# Patient Record
Sex: FEMALE | Race: WHITE | NOT HISPANIC OR LATINO | Employment: OTHER | URBAN - METROPOLITAN AREA
[De-identification: names, ages, dates, MRNs, and addresses within clinical notes are randomized per-mention and may not be internally consistent; named-entity substitution may affect disease eponyms.]

---

## 2018-02-14 ENCOUNTER — CONVERSION ENCOUNTER (OUTPATIENT)
Dept: MAMMOGRAPHY | Facility: HOSPITAL | Age: 73
End: 2018-02-14

## 2018-07-03 ENCOUNTER — OFFICE VISIT CONVERTED (OUTPATIENT)
Dept: SURGERY | Facility: CLINIC | Age: 73
End: 2018-07-03
Attending: PHYSICIAN ASSISTANT

## 2018-07-03 ENCOUNTER — CONVERSION ENCOUNTER (OUTPATIENT)
Dept: SURGERY | Facility: CLINIC | Age: 73
End: 2018-07-03

## 2019-03-29 ENCOUNTER — HOSPITAL ENCOUNTER (OUTPATIENT)
Dept: LAB | Facility: HOSPITAL | Age: 74
Discharge: HOME | End: 2019-03-29
Attending: INTERNAL MEDICINE

## 2019-03-29 LAB
25(OH)D3 SERPL-MCNC: 46.4 NG/ML (ref 30–100)
CALCIUM SERPL-MCNC: 9.5 MG/DL (ref 8.7–10.4)

## 2019-10-20 ENCOUNTER — HOSPITAL ENCOUNTER (OUTPATIENT)
Dept: URGENT CARE | Facility: CLINIC | Age: 74
Discharge: HOME | End: 2019-10-20
Attending: NURSE PRACTITIONER

## 2019-10-23 LAB — BACTERIA UR CULT: NORMAL

## 2019-12-04 ENCOUNTER — HOSPITAL ENCOUNTER (OUTPATIENT)
Dept: LAB | Facility: HOSPITAL | Age: 74
Discharge: HOME | End: 2019-12-04
Attending: INTERNAL MEDICINE

## 2019-12-04 LAB
25(OH)D3 SERPL-MCNC: 48.5 NG/ML (ref 30–100)
CALCIUM SERPL-MCNC: 9.7 MG/DL (ref 8.7–10.4)

## 2020-02-05 ENCOUNTER — HOSPITAL ENCOUNTER (OUTPATIENT)
Dept: MAMMOGRAPHY | Facility: HOSPITAL | Age: 75
Discharge: HOME | End: 2020-02-05
Attending: NURSE PRACTITIONER

## 2020-05-05 ENCOUNTER — HOSPITAL ENCOUNTER (OUTPATIENT)
Dept: LAB | Facility: HOSPITAL | Age: 75
Discharge: HOME | End: 2020-05-05
Attending: INTERNAL MEDICINE

## 2020-05-05 LAB
25(OH)D3 SERPL-MCNC: 52.8 NG/ML (ref 30–100)
CALCIUM SERPL-MCNC: 9.1 MG/DL (ref 8.7–10.4)

## 2020-07-23 ENCOUNTER — HOSPITAL ENCOUNTER (OUTPATIENT)
Dept: URGENT CARE | Facility: CLINIC | Age: 75
Discharge: HOME | End: 2020-07-23
Attending: FAMILY MEDICINE

## 2020-07-24 LAB — SARS-COV-2 RNA SPEC QL NAA+PROBE: NOT DETECTED

## 2020-09-30 ENCOUNTER — HOSPITAL ENCOUNTER (OUTPATIENT)
Dept: LAB | Facility: HOSPITAL | Age: 75
Discharge: HOME | End: 2020-09-30
Attending: INTERNAL MEDICINE

## 2020-10-01 LAB
25(OH)D3 SERPL-MCNC: 50.4 NG/ML (ref 30–100)
CALCIUM SERPL-MCNC: 9.3 MG/DL (ref 8.7–10.4)
CREAT UR-MCNC: 1.12 MG/DL (ref 0.5–0.9)

## 2020-12-15 ENCOUNTER — HOSPITAL ENCOUNTER (OUTPATIENT)
Dept: SURGERY | Facility: CLINIC | Age: 75
Discharge: HOME | End: 2020-12-15
Attending: NURSE PRACTITIONER

## 2020-12-15 ENCOUNTER — OFFICE VISIT CONVERTED (OUTPATIENT)
Dept: UROLOGY | Facility: CLINIC | Age: 75
End: 2020-12-15
Attending: NURSE PRACTITIONER

## 2020-12-17 LAB — BACTERIA UR CULT: NORMAL

## 2021-01-19 ENCOUNTER — HOSPITAL ENCOUNTER (OUTPATIENT)
Dept: GASTROENTEROLOGY | Facility: HOSPITAL | Age: 76
Setting detail: HOSPITAL OUTPATIENT SURGERY
Discharge: HOME | End: 2021-01-19
Attending: INTERNAL MEDICINE

## 2021-04-12 ENCOUNTER — HOSPITAL ENCOUNTER (OUTPATIENT)
Dept: VACCINE CLINIC | Facility: HOSPITAL | Age: 76
Discharge: HOME | End: 2021-04-12
Attending: INTERNAL MEDICINE

## 2021-05-03 ENCOUNTER — HOSPITAL ENCOUNTER (OUTPATIENT)
Dept: VACCINE CLINIC | Facility: HOSPITAL | Age: 76
Discharge: HOME | End: 2021-05-03
Attending: INTERNAL MEDICINE

## 2021-05-10 NOTE — H&P
History and Physical      Patient Name: Devi Bobo   Patient ID: [id number]   Sex: Female   YOB: 1945    Primary Care Provider: Raad Del Toro MD   Referring Provider: Raad Del Toro MD    Visit Date: December 15, 2020    Provider: DEMETRA Beckett   Location: [facility]   Location Address: [address] [place] [address]  [id number]   Location Phone: [phone]          Chief Complaint  · Unable to hold urine  · Urinary urgency      History Of Present Illness  The patient is a 75 year old /White female, who is a consultation from Raad Del Toro MD, for the evaluation of urge incontinence which the patient noted seemed associated with no known event.   Symptoms:  She has had intermittent episodes episodes of incontinence with urgency. She describes leakage of moderate amounts of urine. The symptoms are getting worse. The patient uses 0 pads a day.   The following aggravating factors are noted caffeine. The patient drinks 5 caffeinated drinks a day. She also reports frequency and abdominal pain. The patient voids every 1 hour. She denies dysuria, gross hematuria, needing to strain to void, and bedwetting.   History:  The patient has not been previously evaluated for this condition. Her past medical history is non-contributory. She denies a history of recurrent urinary tract infections, kidney stones, cerebrovascular accidents, and diabetes.   Meds:  Current no  medications        States urgency and states that if she hold her urine it is painful.    She has nausea at times.    She  was previously seen by Shana Claros PA-C and treated with oxybutynin for this incontinence although the patient never returned for her f/u visit.     She reports that the oxybutynin was very effective but she thought her issue was resolved and did not start taking the medication again.    She does have a history of chronic pelvic pain as well that was noted in her last visit from  .       Past Medical History  Arthritis; Arthritis; Bladder Disorder; Bronchitis; Cataract; Chronic pelvic pain in female; Cystocele; Frequency of micturition; High blood pressure; HTN (hypertension); Nocturia; Osteoporosis; Pain, Generalized; Pain, Leg; Recent Cold; Urge Incontinence; Urgency of urination; Urinary incontinence         Past Surgical History  Cataract exctraction with lens implant; Colonoscopy; Cystoscopy; Tubal ligation         Medication List  alendronate 70 mg oral tablet; Calcium 600 + D(3) 600 mg calcium- 200 unit oral capsule; Centrum Silver 0.4-300-250 mg-mcg-mcg oral tablet; lisinopril 10 mg oral tablet; Neurontin 600 mg oral tablet; Norco 5-325 mg oral tablet         Allergy List  SULFA (SULFONAMIDES)         Family Medical History  Multiple Sclerosis; Heart Disease; Renal Calculus; Family history of colon cancer; -Father's Family History Unknown; -Mother's Family History Unknown         Reproductive History   0 Para 0 0 0 0       Social History  Alcohol (Never); Caffeine (Current - status unknown); Disabled; Second hand smoke exposure (Never); Tobacco (Former);          Review of Systems  · Constitutional  o Denies  o : fever, headache, chills  · Eyes  o Denies  o : eye pain, double vision, blurred vision  · HENT  o Denies  o : sinus problems, sore throat, ear infection  · Cardiovascular  o Denies  o : chest pain, high blood pressure, varicosities  · Respiratory  o Denies  o : shortness of breath, wheezing, frequent cough  · Gastrointestinal  o Denies  o : nausea, vomiting, heartburn, indigestion, abdominal pain  · Genitourinary  o Denies  o : urgency, frequency, urinary retention, painful urination  · Integument  o Denies  o : rash, itching, boils  · Neurologic  o Denies  o : tingling or numbness, tremors, dizzy spells  · Musculoskeletal  o Denies  o : joint pain, neck pain, back pain  · Endocrine  o Denies  o : cold intolerance, heat intolerance, tired, excessive thirst,  "sluggish  · Psychiatric  o Admits  o : feels satisfied with life  o Denies  o : severe depression, concerns with hurting themselves  · Heme-Lymph  o Denies  o : swollen glands, blood clotting problems  · Allergic-Immunologic  o Denies  o : sinus allergy symptoms, hay fever      Vitals  Date Time BP Position Site L\R Cuff Size HR RR TEMP (F) WT  HT  BMI kg/m2 BSA m2 O2 Sat FR L/min FiO2        12/15/2020 10:53 AM       12  121lbs 8oz 5'  3\" 21.52 1.57             Physical Examination  · Constitutional  o Appearance  o : Well nourished, well developed patient in no acute distress. Ambulating without difficulty.  · Head and Face  o Head  o :   § Inspection  § : atraumatic, normocephalic  o Face  o :   § Inspection  § : no facial lesions  · Eyes  o Sclerae  o : sclerae white  · Ears, Nose, Mouth and Throat  o Ears  o :   § External Ears  § : appearance within normal limits, no lesions present  o Nose  o :   § External Nose  § : appearance normal  · Neck  o Inspection/Palpation  o : normal appearance, trachea midline  · Respiratory  o Respiratory Effort  o : Breathing is unlabored without accessory muscle use  o Inspection of Chest  o : normal appearance, no retractions  · Skin and Subcutaneous Tissue  o General Inspection  o : No rashes, lesions or areas of discoloration present. Skin turgor is normal.  · Neurologic  o Mental Status Examination  o :   § Orientation  § : grossly oriented to person, place and time  § Speech/Language  § : communication ability within normal limits  o Gait and Station  o : normal gait, able to stand without difficulty  · Psychiatric  o Judgement and Insight  o : judgment and insight intact, judgement for everyday activities and social situations within normal limits, insight intact  o Mood and Affect  o : mood normal, affect appropriate          Results  · In-Office Procedures  o Lab procedure  § Automated dipstick urinalysis with microscopy ([id number])   § Color Ur: Yellow   § Clarity Ur: " Clear   § Glucose Ur Ql Strip: Negative   § Bilirub Ur Ql Strip: Negative   § Ketones Ur Ql Strip: Negative   § Sp Gr Ur Qn: 1.010   § Hgb Ur Ql Strip: Negative   § pH Ur-LsCnc: 6.5   § Prot Ur Ql Strip: Negative   § Urobilinogen Ur Strip-mCnc: 0.2   § Nitrite Ur Ql Strip: Negative   § WBC Est Ur Ql Strip: Trace   § RBC UrnS Qn HPF: 0   § WBC UrnS Qn HPF: 0-2   § Bacteria UrnS Qn HPF: 0   § Crystals UrnS Qn HPF: 0   § Epithelial Cells (non renal): 0 /HPF  § Epithelial Cells (renal): 0       Assessment  · Urge Incontinence     788.31/N39.41      Plan  · Orders  o Urine Culture (Clean Catch) [facility] ([id number]) - 788.31/N39.41 - 12/15/2020  · Medications  o oxybutynin chloride 5 mg oral tablet extended release 24hr   SIG: take 1 tablet (5 mg) by oral route once daily for 30 days   DISP: (30) Tablet with 11 refills  Prescribed on 12/15/2020     o Medications have been Reconciled  o Transition of Care or Provider Policy  · Instructions  o DISCUSSION:  o I have discussed with the patient the diagnosis of incontinence with potential treatment options in detail. Ample time was given for questions. We will proceed with plans as outlined below.  o The patient has mixed incontinence with urge urinary incontinence as the primary component. Options of observation, Kegel exercises, medications, bulking agents and a sling were discussed. All questions were answered.  o PLAN: will restart oxybutynin 5mg qd   o Timed voiding q 3-4 hours  o Instructions Given for Kegel Exercises  o Double voiding  o Avoid bladder irritants  o Cut down nighttime fluids  o Electronically Identified Patient Education Materials Provided Electronically            Electronically Signed by: DEMETRA Beckett -Author on December 15, 2020 11:30:35 AM

## 2021-05-14 VITALS — WEIGHT: 121.5 LBS | HEIGHT: 63 IN | RESPIRATION RATE: 12 BRPM | BODY MASS INDEX: 21.53 KG/M2

## 2021-05-16 VITALS — WEIGHT: 116.12 LBS | BODY MASS INDEX: 20.57 KG/M2 | RESPIRATION RATE: 12 BRPM | HEIGHT: 63 IN

## 2021-05-31 ENCOUNTER — HOSPITAL ENCOUNTER (OUTPATIENT)
Dept: URGENT CARE | Facility: CLINIC | Age: 76
Discharge: HOME | End: 2021-05-31
Attending: FAMILY MEDICINE

## 2021-06-03 LAB — BACTERIA UR CULT: NORMAL

## 2021-09-30 ENCOUNTER — LAB (OUTPATIENT)
Dept: LAB | Facility: HOSPITAL | Age: 76
End: 2021-09-30

## 2021-09-30 ENCOUNTER — TRANSCRIBE ORDERS (OUTPATIENT)
Dept: LAB | Facility: HOSPITAL | Age: 76
End: 2021-09-30

## 2021-09-30 DIAGNOSIS — M81.0 SENILE OSTEOPOROSIS: Primary | ICD-10-CM

## 2021-09-30 DIAGNOSIS — Z79.899 ENCOUNTER FOR LONG-TERM (CURRENT) USE OF OTHER MEDICATIONS: ICD-10-CM

## 2021-09-30 DIAGNOSIS — M81.0 SENILE OSTEOPOROSIS: ICD-10-CM

## 2021-09-30 LAB
CALCIUM SPEC-SCNC: 9.4 MG/DL (ref 8.6–10.5)
CREAT SERPL-MCNC: 1.15 MG/DL (ref 0.57–1)
GFR SERPL CREATININE-BSD FRML MDRD: 46 ML/MIN/1.73

## 2021-09-30 PROCEDURE — 82310 ASSAY OF CALCIUM: CPT

## 2021-09-30 PROCEDURE — 36415 COLL VENOUS BLD VENIPUNCTURE: CPT

## 2021-09-30 PROCEDURE — 82306 VITAMIN D 25 HYDROXY: CPT

## 2021-09-30 PROCEDURE — 82565 ASSAY OF CREATININE: CPT

## 2021-10-01 LAB — 25(OH)D3 SERPL-MCNC: 51.9 NG/ML

## 2021-10-05 ENCOUNTER — OFFICE VISIT (OUTPATIENT)
Dept: OBSTETRICS AND GYNECOLOGY | Facility: CLINIC | Age: 76
End: 2021-10-05

## 2021-10-05 VITALS
WEIGHT: 116.4 LBS | HEIGHT: 64 IN | HEART RATE: 88 BPM | SYSTOLIC BLOOD PRESSURE: 127 MMHG | DIASTOLIC BLOOD PRESSURE: 75 MMHG | BODY MASS INDEX: 19.87 KG/M2

## 2021-10-05 DIAGNOSIS — R87.613 HSIL (HIGH GRADE SQUAMOUS INTRAEPITHELIAL LESION) ON PAP SMEAR OF CERVIX: ICD-10-CM

## 2021-10-05 DIAGNOSIS — Z12.31 ENCOUNTER FOR SCREENING MAMMOGRAM FOR MALIGNANT NEOPLASM OF BREAST: ICD-10-CM

## 2021-10-05 DIAGNOSIS — N95.2 VAGINAL ATROPHY: ICD-10-CM

## 2021-10-05 DIAGNOSIS — Z01.419 ENCOUNTER FOR ANNUAL ROUTINE GYNECOLOGICAL EXAMINATION: Primary | ICD-10-CM

## 2021-10-05 PROBLEM — N81.10 FEMALE BLADDER PROLAPSE: Status: ACTIVE | Noted: 2021-10-05

## 2021-10-05 PROBLEM — R39.15 URINARY URGENCY: Status: ACTIVE | Noted: 2017-12-01

## 2021-10-05 PROBLEM — M47.816 LUMBAR SPONDYLOSIS: Status: ACTIVE | Noted: 2019-02-15

## 2021-10-05 PROBLEM — G57.03 PIRIFORMIS SYNDROME OF BOTH SIDES: Status: ACTIVE | Noted: 2021-10-05

## 2021-10-05 PROBLEM — Z79.4 ENCOUNTER FOR LONG-TERM (CURRENT) USE OF INSULIN: Status: ACTIVE | Noted: 2018-08-21

## 2021-10-05 PROBLEM — I10 HIGH BLOOD PRESSURE: Status: ACTIVE | Noted: 2021-10-05

## 2021-10-05 PROBLEM — I10 HTN (HYPERTENSION): Status: ACTIVE | Noted: 2021-10-05

## 2021-10-05 PROBLEM — M81.0 OSTEOPOROSIS: Status: ACTIVE | Noted: 2020-10-07

## 2021-10-05 PROBLEM — R35.0 FREQUENCY OF MICTURITION: Status: ACTIVE | Noted: 2017-12-01

## 2021-10-05 PROBLEM — H26.9 CATARACT: Status: ACTIVE | Noted: 2021-10-05

## 2021-10-05 PROBLEM — R32 URINARY INCONTINENCE: Status: ACTIVE | Noted: 2021-10-05

## 2021-10-05 PROBLEM — R35.1 NOCTURIA: Status: ACTIVE | Noted: 2017-12-01

## 2021-10-05 PROBLEM — M51.36 DEGENERATION OF LUMBAR INTERVERTEBRAL DISC: Status: ACTIVE | Noted: 2021-10-05

## 2021-10-05 PROBLEM — N32.9 BLADDER DISORDER: Status: ACTIVE | Noted: 2021-10-05

## 2021-10-05 PROBLEM — R52 PAIN, GENERALIZED: Status: ACTIVE | Noted: 2021-10-05

## 2021-10-05 PROBLEM — R87.611 ATYPICAL SQUAMOUS CELLS CANNOT EXCLUDE HIGH GRADE SQUAMOUS INTRAEPITHELIAL LESION ON CYTOLOGIC SMEAR OF CERVIX (ASC-H): Status: ACTIVE | Noted: 2021-10-05

## 2021-10-05 PROBLEM — IMO0002 HERNIATION OF INTERVERTEBRAL DISC: Status: ACTIVE | Noted: 2017-01-12

## 2021-10-05 PROBLEM — J40 BRONCHITIS: Status: ACTIVE | Noted: 2021-10-05

## 2021-10-05 PROBLEM — M51.369 DEGENERATION OF LUMBAR INTERVERTEBRAL DISC: Status: ACTIVE | Noted: 2021-10-05

## 2021-10-05 PROBLEM — N39.41 URGE INCONTINENCE: Status: ACTIVE | Noted: 2017-12-01

## 2021-10-05 PROCEDURE — 88175 CYTOPATH C/V AUTO FLUID REDO: CPT | Performed by: NURSE PRACTITIONER

## 2021-10-05 PROCEDURE — G0101 CA SCREEN;PELVIC/BREAST EXAM: HCPCS | Performed by: NURSE PRACTITIONER

## 2021-10-05 PROCEDURE — 87624 HPV HI-RISK TYP POOLED RSLT: CPT | Performed by: NURSE PRACTITIONER

## 2021-10-05 RX ORDER — LISINOPRIL 10 MG/1
10 TABLET ORAL DAILY
COMMUNITY
Start: 2021-09-20 | End: 2023-04-03 | Stop reason: HOSPADM

## 2021-10-05 RX ORDER — GABAPENTIN 600 MG/1
600 TABLET ORAL 3 TIMES DAILY
COMMUNITY
Start: 2021-09-04

## 2021-10-05 RX ORDER — HYDROCODONE BITARTRATE AND ACETAMINOPHEN 5; 325 MG/1; MG/1
1 TABLET ORAL EVERY 8 HOURS PRN
COMMUNITY
Start: 2021-09-28 | End: 2022-01-20

## 2021-10-05 RX ORDER — ESTRADIOL 0.1 MG/G
0.1 CREAM VAGINAL 3 TIMES WEEKLY
COMMUNITY
End: 2021-10-05 | Stop reason: SDUPTHER

## 2021-10-05 RX ORDER — ALENDRONATE SODIUM 70 MG/1
70 TABLET ORAL
COMMUNITY
Start: 2021-09-10 | End: 2023-04-03 | Stop reason: HOSPADM

## 2021-10-05 RX ORDER — ESTRADIOL 0.1 MG/G
CREAM VAGINAL
Qty: 42 G | Refills: 6 | Status: SHIPPED | OUTPATIENT
Start: 2021-10-05 | End: 2023-01-06 | Stop reason: SDUPTHER

## 2021-10-12 LAB
CONV .: ABNORMAL
CYTOLOGIST CVX/VAG CYTO: ABNORMAL
CYTOLOGY CVX/VAG DOC CYTO: ABNORMAL
CYTOLOGY CVX/VAG DOC THIN PREP: ABNORMAL
DX ICD CODE: ABNORMAL
DX ICD CODE: ABNORMAL
HIV 1 & 2 AB SER-IMP: ABNORMAL
HPV I/H RISK 4 DNA CVX QL PROBE+SIG AMP: POSITIVE
OTHER STN SPEC: ABNORMAL
PATHOLOGIST CVX/VAG CYTO: ABNORMAL
RECOM F/U CVX/VAG CYTO: ABNORMAL
STAT OF ADQ CVX/VAG CYTO-IMP: ABNORMAL

## 2021-10-20 ENCOUNTER — OFFICE VISIT (OUTPATIENT)
Dept: OBSTETRICS AND GYNECOLOGY | Facility: CLINIC | Age: 76
End: 2021-10-20

## 2021-10-20 VITALS
HEART RATE: 82 BPM | WEIGHT: 115.8 LBS | DIASTOLIC BLOOD PRESSURE: 77 MMHG | SYSTOLIC BLOOD PRESSURE: 124 MMHG | BODY MASS INDEX: 19.77 KG/M2 | HEIGHT: 64 IN

## 2021-10-20 DIAGNOSIS — R87.611 ATYPICAL SQUAMOUS CELLS CANNOT EXCLUDE HIGH GRADE SQUAMOUS INTRAEPITHELIAL LESION ON CYTOLOGIC SMEAR OF CERVIX (ASC-H): Primary | ICD-10-CM

## 2021-10-20 DIAGNOSIS — R87.810 CERVICAL HIGH RISK HPV (HUMAN PAPILLOMAVIRUS) TEST POSITIVE: ICD-10-CM

## 2021-10-20 PROBLEM — M19.90 OSTEOARTHRITIS: Status: ACTIVE | Noted: 2021-10-20

## 2021-10-20 PROBLEM — G57.00 SCIATIC NERVE LESION: Status: ACTIVE | Noted: 2021-10-20

## 2021-10-20 PROBLEM — M47.817 LUMBOSACRAL SPONDYLOSIS WITHOUT MYELOPATHY: Status: ACTIVE | Noted: 2021-10-20

## 2021-10-20 PROBLEM — M47.812 CERVICAL SPONDYLOSIS WITHOUT MYELOPATHY: Status: ACTIVE | Noted: 2021-10-20

## 2021-10-20 PROBLEM — R42 CHRONIC VERTIGO: Status: ACTIVE | Noted: 2021-10-20

## 2021-10-20 PROBLEM — R26.9 ABNORMAL GAIT: Status: ACTIVE | Noted: 2021-10-20

## 2021-10-20 PROBLEM — G93.32 CHRONIC FATIGUE SYNDROME: Status: ACTIVE | Noted: 2021-10-20

## 2021-10-20 PROCEDURE — 88305 TISSUE EXAM BY PATHOLOGIST: CPT | Performed by: NURSE PRACTITIONER

## 2021-10-20 PROCEDURE — 57456 ENDOCERV CURETTAGE W/SCOPE: CPT | Performed by: NURSE PRACTITIONER

## 2021-10-20 NOTE — PROGRESS NOTES
Colposcopy Procedure Note  Procedures    Indications: Devi Bobo is a 76 y.o. female, , whose No LMP recorded. Patient is postmenopausal..  She presents for follow up for evaluation of an abnormal PAP smear that showed ASCUS, cannot exclude ASC-H, +HPV.  Pap on 19 was HGSIL with negative ECC on colposcopy.  She understands the need for the procedure and is aware of the complications, including post-colposcopic vaginal bleeding, vaginal leukorrhea or cervicitis.  She is aware she may experience discomfort.  After being presented with the risk, benefits, and alternatives the patient wished to proceed.    Previous hx of abnormal pap  - yes, HGSIL    Prior cervical treatment: prior colposcopy.    Procedure Details   The risks and benefits of the procedure and informed consent obtained.  She was positioned in the dorsal lithotomy position and a speculum was inserted into the vagina and excellent visualization of cervix achieved, cervix swabbed x 3 with acetic acid solution and with Lugol's solution.  The transformation zone was not completely visualized.  Cervical Biopsy Performed?  no  An endocervical curettage was performed.  This colposcopy was satisfactory.     Findings:  The procedure was notable for:  Physical Exam  Vitals and nursing note reviewed. Exam conducted with a chaperone present.   Constitutional:       Appearance: Normal appearance. She is well-developed and well-groomed.   HENT:      Head: Normocephalic.   Eyes:      Pupils: Pupils are equal, round, and reactive to light.   Genitourinary:     General: Normal vulva.      Exam position: Lithotomy position.         Skin:     General: Skin is warm and dry.   Neurological:      General: No focal deficit present.      Mental Status: She is alert.         Specimens: ECC  Specimens labelled and sent to Pathology.    Complications: none.    Assessment and Plan    Problem List Items Addressed This Visit        Genitourinary and Reproductive      Atypical squamous cells cannot exclude high grade squamous intraepithelial lesion on cytologic smear of cervix (ASC-H) - Primary    Relevant Orders    Tissue Pathology Exam      Other Visit Diagnoses     Cervical high risk HPV (human papillomavirus) test positive        Relevant Orders    Tissue Pathology Exam          1. Will base further treatment on Pathology findings.  2. Treatment options discussed with patient.  3. Post biopsy instructions given to patient.    Sanjiv Kelly, APRN  10/20/2021

## 2021-10-22 LAB
CYTO UR: NORMAL
LAB AP CASE REPORT: NORMAL
LAB AP CLINICAL INFORMATION: NORMAL
PATH REPORT.FINAL DX SPEC: NORMAL
PATH REPORT.GROSS SPEC: NORMAL

## 2021-10-26 NOTE — PROGRESS NOTES
She said that they told her she owes $200 and she can not afford that right now that's why she quit going there and came here. If they need that up front at her appointment they make her she wont be able to pay. But would like apt with dr covington to discuss procedure.

## 2021-12-01 ENCOUNTER — OFFICE VISIT (OUTPATIENT)
Dept: OBSTETRICS AND GYNECOLOGY | Facility: CLINIC | Age: 76
End: 2021-12-01

## 2021-12-01 VITALS
HEART RATE: 92 BPM | SYSTOLIC BLOOD PRESSURE: 126 MMHG | DIASTOLIC BLOOD PRESSURE: 75 MMHG | HEIGHT: 64 IN | WEIGHT: 119 LBS | BODY MASS INDEX: 20.32 KG/M2

## 2021-12-01 DIAGNOSIS — N95.2 VAGINAL ATROPHY: ICD-10-CM

## 2021-12-01 DIAGNOSIS — R87.611 ATYPICAL SQUAMOUS CELLS CANNOT EXCLUDE HIGH GRADE SQUAMOUS INTRAEPITHELIAL LESION ON CYTOLOGIC SMEAR OF CERVIX (ASC-H): Primary | ICD-10-CM

## 2021-12-01 PROCEDURE — 99214 OFFICE O/P EST MOD 30 MIN: CPT | Performed by: OBSTETRICS & GYNECOLOGY

## 2021-12-01 RX ORDER — SODIUM CHLORIDE, SODIUM LACTATE, POTASSIUM CHLORIDE, CALCIUM CHLORIDE 600; 310; 30; 20 MG/100ML; MG/100ML; MG/100ML; MG/100ML
125 INJECTION, SOLUTION INTRAVENOUS CONTINUOUS
Status: CANCELLED | OUTPATIENT
Start: 2021-12-01

## 2021-12-01 RX ORDER — ONDANSETRON 2 MG/ML
4 INJECTION INTRAMUSCULAR; INTRAVENOUS EVERY 6 HOURS PRN
Status: CANCELLED | OUTPATIENT
Start: 2021-12-01

## 2021-12-01 NOTE — PROGRESS NOTES
"GYN Visit    CC: Follow-up abnormal Pap    HPI:   76 y.o. who presents in follow-up of an abnormal Pap smear.  The patient reports that her last 2 Paps were abnormal.  Her most recent Pap was ASCUS H.  The patient is unsure of the result of the Pap smear prior to that.  He has no complaints today.    History: PMHx, Meds, Allergies, PSHx, Social Hx, and POBHx all reviewed and updated.    /75   Pulse 92   Ht 162.6 cm (64.02\")   Wt 54 kg (119 lb)   BMI 20.41 kg/m²     Physical Exam  Vitals and nursing note reviewed.   Constitutional:       General: She is not in acute distress.     Appearance: Normal appearance. She is normal weight. She is not ill-appearing.   HENT:      Head: Normocephalic and atraumatic.      Mouth/Throat:      Mouth: Mucous membranes are moist.      Pharynx: Oropharynx is clear.   Eyes:      Extraocular Movements: Extraocular movements intact.   Cardiovascular:      Rate and Rhythm: Normal rate and regular rhythm.      Heart sounds: Normal heart sounds.   Pulmonary:      Effort: Pulmonary effort is normal. No respiratory distress.      Breath sounds: Normal breath sounds.   Abdominal:      General: Abdomen is flat. Bowel sounds are normal. There is no distension.      Palpations: Abdomen is soft. There is no mass.      Tenderness: There is no abdominal tenderness. There is no guarding or rebound.      Hernia: No hernia is present.   Genitourinary:     General: Normal vulva.      Exam position: Lithotomy position.      Labia:         Right: No rash, tenderness, lesion or injury.         Left: No rash, tenderness, lesion or injury.       Urethra: No urethral pain, urethral swelling or urethral lesion.      Vagina: No signs of injury and foreign body. No vaginal discharge, erythema, tenderness, bleeding, lesions or prolapsed vaginal walls.      Cervix: Normal.      Uterus: Normal.       Adnexa: Right adnexa normal and left adnexa normal.      Comments: There is significant vaginal " atrophy and vulvar atrophy from the menopausal status.  The cervix grossly appears normal  Musculoskeletal:         General: No swelling.      Right lower leg: No edema.      Left lower leg: No edema.   Skin:     General: Skin is warm and dry.      Findings: No lesion or rash.   Neurological:      Mental Status: She is alert and oriented to person, place, and time.   Psychiatric:         Mood and Affect: Mood normal.         Behavior: Behavior normal.         Thought Content: Thought content normal.           ASSESSMENT AND PLAN:  Diagnoses and all orders for this visit:    1. Atypical squamous cells cannot exclude high grade squamous intraepithelial lesion on cytologic smear of cervix (ASC-H) (Primary)  Assessment & Plan:  Discussed her Pap smear results and reviewed options for management.  Discussed the option of colposcopy however discussed the challenges of colposcopy in a postmenopausal woman due to the likelihood of and an adequate Pap smear from the receding transformation zone.  Offered a loop electrode excision procedure in the OR.  Discussed that this could be both diagnostic and therapeutic.  This could also identify lesions that have receded into the endocervical canal.  The risks, benefits, and alternatives to the procedure have been reviewed the patient.  The risks included, but not limited to, infection, bleeding, hemorrhage, blood transfusion. Injury to nearby structures including: Bowel, bladder, pelvic blood vessels and nerves, ureters, and other nearby structures.  We discussed the risks of anesthesia.  The risks of postoperative complications, such as: Venous thromboembolism, myocardial infarction, stroke, and death.  The patient expressed her extending of the risks on the fracture, and alternatives to the procedure, and wishes to proceed.    Orders:  -     Case Request; Standing  -     Case Request    2. Vaginal atrophy    Other orders  -     Follow Anesthesia Guidelines / Protocol; Future  -      Provide NPO Instructions to Patient; Future        Follow Up:  Return for After OR.      El Álvarez MD  12/01/2021

## 2021-12-01 NOTE — ASSESSMENT & PLAN NOTE
Discussed her Pap smear results and reviewed options for management.  Discussed the option of colposcopy however discussed the challenges of colposcopy in a postmenopausal woman due to the likelihood of and an adequate Pap smear from the receding transformation zone.  Offered a loop electrode excision procedure in the OR.  Discussed that this could be both diagnostic and therapeutic.  This could also identify lesions that have receded into the endocervical canal.  The risks, benefits, and alternatives to the procedure have been reviewed the patient.  The risks included, but not limited to, infection, bleeding, hemorrhage, blood transfusion. Injury to nearby structures including: Bowel, bladder, pelvic blood vessels and nerves, ureters, and other nearby structures.  We discussed the risks of anesthesia.  The risks of postoperative complications, such as: Venous thromboembolism, myocardial infarction, stroke, and death.  The patient expressed her extending of the risks on the fracture, and alternatives to the procedure, and wishes to proceed.

## 2022-01-05 PROCEDURE — S0260 H&P FOR SURGERY: HCPCS | Performed by: OBSTETRICS & GYNECOLOGY

## 2022-01-06 ENCOUNTER — HOSPITAL ENCOUNTER (OUTPATIENT)
Facility: HOSPITAL | Age: 77
Setting detail: HOSPITAL OUTPATIENT SURGERY
Discharge: HOME | End: 2022-01-06
Attending: OBSTETRICS & GYNECOLOGY | Admitting: OBSTETRICS & GYNECOLOGY

## 2022-01-06 ENCOUNTER — ANESTHESIA (OUTPATIENT)
Dept: PERIOP | Facility: HOSPITAL | Age: 77
End: 2022-01-06

## 2022-01-06 ENCOUNTER — ANESTHESIA EVENT (OUTPATIENT)
Dept: PERIOP | Facility: HOSPITAL | Age: 77
End: 2022-01-06

## 2022-01-06 VITALS
HEART RATE: 68 BPM | BODY MASS INDEX: 21.05 KG/M2 | OXYGEN SATURATION: 100 % | HEIGHT: 63 IN | DIASTOLIC BLOOD PRESSURE: 73 MMHG | TEMPERATURE: 97 F | SYSTOLIC BLOOD PRESSURE: 129 MMHG | RESPIRATION RATE: 16 BRPM | WEIGHT: 118.83 LBS

## 2022-01-06 DIAGNOSIS — R87.611 ATYPICAL SQUAMOUS CELLS CANNOT EXCLUDE HIGH GRADE SQUAMOUS INTRAEPITHELIAL LESION ON CYTOLOGIC SMEAR OF CERVIX (ASC-H): ICD-10-CM

## 2022-01-06 LAB
ABO GROUP BLD: NORMAL
BASOPHILS # BLD AUTO: 0.07 10*3/MM3 (ref 0–0.2)
BASOPHILS NFR BLD AUTO: 1 % (ref 0–1.5)
BLD GP AB SCN SERPL QL: NEGATIVE
DEPRECATED RDW RBC AUTO: 43.9 FL (ref 37–54)
EOSINOPHIL # BLD AUTO: 0.14 10*3/MM3 (ref 0–0.4)
EOSINOPHIL NFR BLD AUTO: 2.1 % (ref 0.3–6.2)
ERYTHROCYTE [DISTWIDTH] IN BLOOD BY AUTOMATED COUNT: 13.2 % (ref 12.3–15.4)
HCT VFR BLD AUTO: 34.9 % (ref 34–46.6)
HGB BLD-MCNC: 11.6 G/DL (ref 12–15.9)
IMM GRANULOCYTES # BLD AUTO: 0.01 10*3/MM3 (ref 0–0.05)
IMM GRANULOCYTES NFR BLD AUTO: 0.1 % (ref 0–0.5)
LYMPHOCYTES # BLD AUTO: 1.33 10*3/MM3 (ref 0.7–3.1)
LYMPHOCYTES NFR BLD AUTO: 19.8 % (ref 19.6–45.3)
MCH RBC QN AUTO: 30.3 PG (ref 26.6–33)
MCHC RBC AUTO-ENTMCNC: 33.2 G/DL (ref 31.5–35.7)
MCV RBC AUTO: 91.1 FL (ref 79–97)
MONOCYTES # BLD AUTO: 0.56 10*3/MM3 (ref 0.1–0.9)
MONOCYTES NFR BLD AUTO: 8.3 % (ref 5–12)
NEUTROPHILS NFR BLD AUTO: 4.62 10*3/MM3 (ref 1.7–7)
NEUTROPHILS NFR BLD AUTO: 68.7 % (ref 42.7–76)
NRBC BLD AUTO-RTO: 0 /100 WBC (ref 0–0.2)
PLATELET # BLD AUTO: 216 10*3/MM3 (ref 140–450)
PMV BLD AUTO: 10.3 FL (ref 6–12)
RBC # BLD AUTO: 3.83 10*6/MM3 (ref 3.77–5.28)
RH BLD: POSITIVE
T&S EXPIRATION DATE: NORMAL
WBC NRBC COR # BLD: 6.73 10*3/MM3 (ref 3.4–10.8)

## 2022-01-06 PROCEDURE — 86901 BLOOD TYPING SEROLOGIC RH(D): CPT | Performed by: OBSTETRICS & GYNECOLOGY

## 2022-01-06 PROCEDURE — 25010000002 FENTANYL CITRATE (PF) 50 MCG/ML SOLUTION: Performed by: NURSE ANESTHETIST, CERTIFIED REGISTERED

## 2022-01-06 PROCEDURE — 25010000002 MIDAZOLAM PER 1 MG: Performed by: ANESTHESIOLOGY

## 2022-01-06 PROCEDURE — 88305 TISSUE EXAM BY PATHOLOGIST: CPT | Performed by: OBSTETRICS & GYNECOLOGY

## 2022-01-06 PROCEDURE — 25010000002 PROPOFOL 10 MG/ML EMULSION: Performed by: NURSE ANESTHETIST, CERTIFIED REGISTERED

## 2022-01-06 PROCEDURE — 85025 COMPLETE CBC W/AUTO DIFF WBC: CPT | Performed by: OBSTETRICS & GYNECOLOGY

## 2022-01-06 PROCEDURE — 86900 BLOOD TYPING SEROLOGIC ABO: CPT | Performed by: OBSTETRICS & GYNECOLOGY

## 2022-01-06 PROCEDURE — 88342 IMHCHEM/IMCYTCHM 1ST ANTB: CPT | Performed by: OBSTETRICS & GYNECOLOGY

## 2022-01-06 PROCEDURE — 57461 CONZ OF CERVIX W/SCOPE LEEP: CPT | Performed by: OBSTETRICS & GYNECOLOGY

## 2022-01-06 PROCEDURE — 88307 TISSUE EXAM BY PATHOLOGIST: CPT | Performed by: OBSTETRICS & GYNECOLOGY

## 2022-01-06 PROCEDURE — 25010000002 HYDROMORPHONE 1 MG/ML SOLUTION: Performed by: ANESTHESIOLOGY

## 2022-01-06 PROCEDURE — 86850 RBC ANTIBODY SCREEN: CPT | Performed by: OBSTETRICS & GYNECOLOGY

## 2022-01-06 RX ORDER — OXYCODONE HYDROCHLORIDE 5 MG/1
5 TABLET ORAL
Status: DISCONTINUED | OUTPATIENT
Start: 2022-01-06 | End: 2022-01-06 | Stop reason: HOSPADM

## 2022-01-06 RX ORDER — ACETAMINOPHEN 500 MG
1000 TABLET ORAL ONCE
Status: COMPLETED | OUTPATIENT
Start: 2022-01-06 | End: 2022-01-06

## 2022-01-06 RX ORDER — MIDAZOLAM HYDROCHLORIDE 1 MG/ML
2 INJECTION INTRAMUSCULAR; INTRAVENOUS ONCE
Status: COMPLETED | OUTPATIENT
Start: 2022-01-06 | End: 2022-01-06

## 2022-01-06 RX ORDER — SODIUM CHLORIDE, SODIUM LACTATE, POTASSIUM CHLORIDE, CALCIUM CHLORIDE 600; 310; 30; 20 MG/100ML; MG/100ML; MG/100ML; MG/100ML
125 INJECTION, SOLUTION INTRAVENOUS CONTINUOUS
Status: DISCONTINUED | OUTPATIENT
Start: 2022-01-06 | End: 2022-01-06 | Stop reason: HOSPADM

## 2022-01-06 RX ORDER — FENTANYL CITRATE 50 UG/ML
INJECTION, SOLUTION INTRAMUSCULAR; INTRAVENOUS AS NEEDED
Status: DISCONTINUED | OUTPATIENT
Start: 2022-01-06 | End: 2022-01-06 | Stop reason: SURG

## 2022-01-06 RX ORDER — SODIUM CHLORIDE, SODIUM LACTATE, POTASSIUM CHLORIDE, CALCIUM CHLORIDE 600; 310; 30; 20 MG/100ML; MG/100ML; MG/100ML; MG/100ML
9 INJECTION, SOLUTION INTRAVENOUS CONTINUOUS PRN
Status: DISCONTINUED | OUTPATIENT
Start: 2022-01-06 | End: 2022-01-06 | Stop reason: HOSPADM

## 2022-01-06 RX ORDER — PROMETHAZINE HYDROCHLORIDE 25 MG/1
25 SUPPOSITORY RECTAL ONCE AS NEEDED
Status: DISCONTINUED | OUTPATIENT
Start: 2022-01-06 | End: 2022-01-06 | Stop reason: HOSPADM

## 2022-01-06 RX ORDER — BUPIVACAINE HYDROCHLORIDE AND EPINEPHRINE 5; 5 MG/ML; UG/ML
INJECTION, SOLUTION EPIDURAL; INTRACAUDAL; PERINEURAL AS NEEDED
Status: DISCONTINUED | OUTPATIENT
Start: 2022-01-06 | End: 2022-01-06 | Stop reason: HOSPADM

## 2022-01-06 RX ORDER — IBUPROFEN 600 MG/1
600 TABLET ORAL EVERY 6 HOURS PRN
Status: DISCONTINUED | OUTPATIENT
Start: 2022-01-06 | End: 2022-01-06 | Stop reason: HOSPADM

## 2022-01-06 RX ORDER — ONDANSETRON 2 MG/ML
4 INJECTION INTRAMUSCULAR; INTRAVENOUS EVERY 6 HOURS PRN
Status: DISCONTINUED | OUTPATIENT
Start: 2022-01-06 | End: 2022-01-06 | Stop reason: HOSPADM

## 2022-01-06 RX ORDER — LIDOCAINE HYDROCHLORIDE 20 MG/ML
INJECTION, SOLUTION INFILTRATION; PERINEURAL AS NEEDED
Status: DISCONTINUED | OUTPATIENT
Start: 2022-01-06 | End: 2022-01-06 | Stop reason: SURG

## 2022-01-06 RX ORDER — LUGOLS 0.4 G/G
LIQUID TOPICAL AS NEEDED
Status: DISCONTINUED | OUTPATIENT
Start: 2022-01-06 | End: 2022-01-06 | Stop reason: HOSPADM

## 2022-01-06 RX ORDER — ONDANSETRON 2 MG/ML
4 INJECTION INTRAMUSCULAR; INTRAVENOUS ONCE AS NEEDED
Status: DISCONTINUED | OUTPATIENT
Start: 2022-01-06 | End: 2022-01-06 | Stop reason: HOSPADM

## 2022-01-06 RX ORDER — IBUPROFEN 600 MG/1
600 TABLET ORAL EVERY 6 HOURS PRN
Qty: 60 TABLET | Refills: 1 | Status: SHIPPED | OUTPATIENT
Start: 2022-01-06

## 2022-01-06 RX ORDER — MEPERIDINE HYDROCHLORIDE 25 MG/ML
12.5 INJECTION INTRAMUSCULAR; INTRAVENOUS; SUBCUTANEOUS
Status: DISCONTINUED | OUTPATIENT
Start: 2022-01-06 | End: 2022-01-06 | Stop reason: HOSPADM

## 2022-01-06 RX ORDER — PROMETHAZINE HYDROCHLORIDE 12.5 MG/1
25 TABLET ORAL ONCE AS NEEDED
Status: DISCONTINUED | OUTPATIENT
Start: 2022-01-06 | End: 2022-01-06 | Stop reason: HOSPADM

## 2022-01-06 RX ADMIN — PROPOFOL 150 MCG/KG/MIN: 10 INJECTION, EMULSION INTRAVENOUS at 12:39

## 2022-01-06 RX ADMIN — SODIUM CHLORIDE, POTASSIUM CHLORIDE, SODIUM LACTATE AND CALCIUM CHLORIDE 9 ML/HR: 600; 310; 30; 20 INJECTION, SOLUTION INTRAVENOUS at 09:39

## 2022-01-06 RX ADMIN — HYDROMORPHONE HYDROCHLORIDE 0.5 MG: 1 INJECTION, SOLUTION INTRAMUSCULAR; INTRAVENOUS; SUBCUTANEOUS at 13:41

## 2022-01-06 RX ADMIN — FENTANYL CITRATE 50 MCG: 50 INJECTION, SOLUTION INTRAMUSCULAR; INTRAVENOUS at 12:37

## 2022-01-06 RX ADMIN — OXYCODONE HYDROCHLORIDE 5 MG: 5 TABLET ORAL at 13:57

## 2022-01-06 RX ADMIN — ACETAMINOPHEN 1000 MG: 500 TABLET ORAL at 09:41

## 2022-01-06 RX ADMIN — LIDOCAINE HYDROCHLORIDE 50 MG: 20 INJECTION, SOLUTION INFILTRATION; PERINEURAL at 12:39

## 2022-01-06 RX ADMIN — MIDAZOLAM 2 MG: 1 INJECTION INTRAMUSCULAR; INTRAVENOUS at 12:19

## 2022-01-06 NOTE — H&P
[facility]   HISTORY AND PHYSICAL    Patient Name: Devi Bobo  : 1945  MRN: [id number]  Primary Care Physician:  Raad Del Toro MD  Date of admission: 2022    Subjective   Subjective     Chief Complaint: Here for surgery    HPI:    Devi Bobo is a 76 y.o. female who presents for surgical management of a high grade pap smear. She has no complaints today.     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 Otherwise pertinent FHx was reviewed and not pertinent to current issue.    Social History:  reports that she has never smoked. She has never used smokeless tobacco. She reports previous alcohol use. She reports previous drug use.    Home Medications:  Calcium Carbonate-Vitamin D, Cholecalciferol, HYDROcodone-acetaminophen, alendronate, estradiol, gabapentin, and lisinopril      Allergies:  Allergies   Allergen Reactions   • Sulfa Antibiotics Anaphylaxis       Objective   Objective     Vitals:   Temp:  [97.3 °F (36.3 °C)] 97.3 °F (36.3 °C)  Heart Rate:   [90] 90  Resp:  [18] 18  BP: (141)/(71) 141/71  Physical Exam    Constitutional: Awake, alert   Eyes: PERRLA, sclerae anicteric, no conjunctival injection   HENT: NCAT, mucous membranes moist   Neck: Supple, no thyromegaly, no lymphadenopathy, trachea midline   Respiratory: Clear to auscultation bilaterally, nonlabored respirations    Cardiovascular: RRR, no murmurs, rubs, or gallops, palpable pedal pulses bilaterally   Gastrointestinal: Positive bowel sounds, soft, nontender, nondistended              Genitourinary: atrophic vulva, vagina and cervix. The cervix is grossly normal. The uterus is 5-6cm and nontender. There are no palpable adnexal masses   Musculoskeletal: No bilateral ankle edema, no clubbing or cyanosis to extremities   Psychiatric: Appropriate affect, cooperative   Neurologic: Oriented x 3, strength symmetric in all extremities, speech clear   Skin: No rashes     Result Review    Result Review:  I have personally reviewed the results from the time of this admission to 1/6/2022 11:56 EST and agree with these findings:  []  Laboratory  []  Microbiology  []  Radiology  []  EKG/Telemetry   []  Cardiology/Vascular   []  Pathology  []  Old records  []  Other:      Assessment/Plan   Assessment / Plan     Active Hospital Problems:  Active Hospital Problems    Diagnosis    • **Atypical squamous cells cannot exclude high grade squamous intraepithelial lesion on cytologic smear of cervix (ASC-H)      Plan:  The risks, benefits, and alternatives to the procedure have been reviewed the patient.  The risks included, but not limited to, infection, bleeding, hemorrhage, blood transfusion. Injury to nearby structures including: Bowel, bladder, pelvic blood vessels and nerves, ureters, and other nearby structures.  We discussed the risks of anesthesia.  The risks of postoperative complications, such as: Venous thromboembolism, myocardial infarction, stroke, and death.  The patient expressed her extending of the risks  on the fracture, and alternatives to the procedure, and wishes to proceed.      Electronically signed by El Álvarez MD, 01/05/22, 11:02 PM EST.

## 2022-01-06 NOTE — BRIEF OP NOTE
LOOP ELECTROCAUTERY EXCISION PROCEDURE  Progress Note    Devi Bobo  1/6/2022    Pre-op Diagnosis:   Atypical squamous cells cannot exclude high grade squamous intraepithelial lesion on cytologic smear of cervix (ASC-H) [R87.611]       Post-Op Diagnosis Codes:     * Atypical squamous cells cannot exclude high grade squamous intraepithelial lesion on cytologic smear of cervix (ASC-H) [R87.611]    Procedure(s):  LOOP ELECTROCAUTERY EXCISION PROCEDURE    Surgeon(s):  El Álvarez MD    Anesthesia: Choice    Staff:   Circulator: Erum Bhat RN  Scrub Person: Monalisa Barber  Other: Monalisa Maki, RN CSA         Estimated Blood Loss: 10 mL    Urine Voided: 400 mL    Specimens:                Specimens     ID Source Type Tests Collected By Collected At Frozen?    A Cervix Tissue · TISSUE PATHOLOGY EXAM   El Álvarez MD 1/6/22 1255     Description: LEEP biopsy hypo at 12 oclock    Comment: Loop Electrode excision Procedure    B Endocervix Tissue · TISSUE PATHOLOGY EXAM   El Álvarez MD 1/6/22 1257     Description: endocervical curettings          Findings: Atrophic vulva and vagina.  Shortened and atrophic cervix.  Colposcopy was inadequate with the transformation zone not visible.  There was no acetowhite epithelium or decreased uptake of Lugol's iodine solution.    Complications: None    After reviewing the informed consent including the risks, benefits and alternatives to the procedure, the patient expressed her understanding and desired to proceed.  She was taken to the operating room with an I.V. in place in running.  She was placed on the operating table in the dorsal supine position.  She was given a MAC anesthetic, with supplemental oxygen via nasal cannula.  The patient was re-positioned into the dorsal lithotomy position.  Her legs were placed in Yellofin stirrups, and her arms were left out to the side.  We took care in positioning both the arms and legs,  padding any potential pressure points.  In sterile fashion, the patient had an in and out catheterization to drain her bladder.  The perineum was prepped and draped in the usual fashion.      After a surgical time out was performed, I inserted a coated speculum inside the vagina to visualize the cervix.  The cervix was very short, almost flush with the vagina.  I did feel there was adequate tissue for a small excision.  Colposcopy was carried out revealing no acetowhite epithelium.  The cervix was counterstained with Lugol's strong iodine solution, confirming my initial findings.  The colposcopy was inadequate as I could not identify the transformation zone, which is likely receded into the endocervical canal.  Given the high-grade Pap I had anticipated this and had previously counseled the patient that we should proceed with excision.  I performed a paracervical block using 10 ml of 0.5% Marcaine with Epinephrine. I called for the 12 x 15 mm tungsten loop electrode. I performed a single pass LEEP.  I started the LEEP from the patient's left side and proceeded to the right side.  I was careful not to excise to deep given the very short cervix. I carried out a systematic curettage of the endocervix, passing this off labeled ECC.  I switched from the loop electrode to the long Bovie tip, and cauterized the entire cervical base.  There was excellent hemostasis at this point.  I applied AstrinGyn to the entire cervical LEEP bed, to further aid in hemostasis.  After observing this area for several minutes, there was no evidence of any bleeding. This ended my procedure.  The coated speculum was removed from the vagina.  The patient was taken out of lithotomy position and awoken from her MAC anesthetic without difficulty.  She was transferred to the recovery room in satisfactory condition.  All counts were correct x 2. The did not require preoperative antibiotics.      The patient will be discharged home when she meets  discharge criteria.  The patient will follow-up with me in approximately 2-3 weeks.  An appointment will be made for her prior to her discharge.  The patient was instructed to call for any of the following:  Temperature greater than 100 degrees Fahrenheit, shortness of breath or chest pain, excessive nausea, vomiting or inability to tolerate oral intake, pain is worsening despite current pain medication, any foul smelling vaginal discharge, heavy vaginal bleeding, or any other concerns.      El Álvarez MD     Date: 1/6/2022  Time: 13:22 EST

## 2022-01-06 NOTE — ADDENDUM NOTE
Addendum  created 01/06/22 1334 by Gilbert Davidson MD    Order list changed, Order sets accessed

## 2022-01-06 NOTE — ANESTHESIA PREPROCEDURE EVALUATION
Anesthesia Evaluation     Patient summary reviewed and Nursing notes reviewed   no history of anesthetic complications:  NPO Solid Status: > 8 hours  NPO Liquid Status: > 2 hours           Airway   Mallampati: II  TM distance: >3 FB  Neck ROM: full  No difficulty expected  Dental    (+) edentulous    Pulmonary - negative pulmonary ROS and normal exam    breath sounds clear to auscultation  Cardiovascular - normal exam  Exercise tolerance: good (4-7 METS)    Rhythm: regular    (+) hypertension,       Neuro/Psych- negative ROS  GI/Hepatic/Renal/Endo - negative ROS     Musculoskeletal     Abdominal    Substance History - negative use     OB/GYN negative ob/gyn ROS         Other   arthritis,                    Anesthesia Plan    ASA 2     general   total IV anesthesia(Patient understands anesthesia not responsible for dental damage.)  intravenous induction     Anesthetic plan, all risks, benefits, and alternatives have been provided, discussed and informed consent has been obtained with: patient.    Plan discussed with CRNA.

## 2022-01-06 NOTE — ANESTHESIA POSTPROCEDURE EVALUATION
Patient: Devi Bobo    Procedure Summary     Date: 01/06/22 Room / Location: [facility] OR 05 / [facility] MAIN OR    Anesthesia Start: 1232 Anesthesia Stop: 1317    Procedure: LOOP ELECTROCAUTERY EXCISION PROCEDURE (N/A Cervix) Diagnosis:       Atypical squamous cells cannot exclude high grade squamous intraepithelial lesion on cytologic smear of cervix (ASC-H)      (Atypical squamous cells cannot exclude high grade squamous intraepithelial lesion on cytologic smear of cervix (ASC-H) [R87.611])    Surgeons: El Álvarez MD Provider: Eladio Cutler MD    Anesthesia Type: general ASA Status: 2          Anesthesia Type: general    Vitals  Vitals Value Taken Time   /56 01/06/22 1332   Temp     Pulse 80 01/06/22 1333   Resp     SpO2 99 % 01/06/22 1333   Vitals shown include unvalidated device data.        Post Anesthesia Care and Evaluation    Patient location during evaluation: bedside  Patient participation: complete - patient participated  Level of consciousness: awake and alert  Pain management: adequate  Airway patency: patent  Anesthetic complications: No anesthetic complications  PONV Status: none  Cardiovascular status: acceptable  Respiratory status: acceptable  Hydration status: acceptable    Comments: An Anesthesiologist personally participated in the most demanding procedures (including induction and emergence if applicable) in the anesthesia plan, monitored the course of anesthesia administration at frequent intervals and remained physically present and available for immediate diagnosis and treatment of emergencies.

## 2022-01-06 NOTE — BRIEF OP NOTE
LOOP ELECTROCAUTERY EXCISION PROCEDURE  Progress Note    Devi Bobo  1/6/2022    Pre-op Diagnosis:   Atypical squamous cells cannot exclude high grade squamous intraepithelial lesion on cytologic smear of cervix (ASC-H) [R87.611]       Post-Op Diagnosis Codes:     * Atypical squamous cells cannot exclude high grade squamous intraepithelial lesion on cytologic smear of cervix (ASC-H) [R87.611]    Procedure/CPT® Codes:  PA COLPOSCOPY,CERVIX W/ADJ VAG,W/LOOP BX [32679]      Procedure(s):  LOOP ELECTROCAUTERY EXCISION PROCEDURE  Paracervical block    Surgeon(s):  El Álvarez MD    Anesthesia: Choice    Staff:   Circulator: Erum Bhat RN  Scrub Person: Monalisa Barber  Other: Monalisa Maki RN CSA         Estimated Blood Loss: 10 mL    Urine Voided: 400 mL    Specimens:                Specimens     ID Source Type Tests Collected By Collected At Frozen?    A Cervix Tissue · TISSUE PATHOLOGY EXAM   El Álvarez MD 1/6/22 1255     Description: LEEP biopsy hypo at 12 oclock    Comment: Loop Electrode excision Procedure    B Endocervix Tissue · TISSUE PATHOLOGY EXAM   El Álvarez MD 1/6/22 1257     Description: endocervical curettings            Findings: The vagina and vulva were atrophic.  There was a very shortened cervix.  The colposcopy was inadequate.  No acetowhite epithelium or decreased Lugol's uptake was noted.    Complications: None    After reviewing the informed consent including the risks, benefits and alternatives to the procedure, the patient expressed her understanding and desired to proceed.  She was taken to the operating room with an I.V. in place in running.  She was placed on the operating table in the dorsal supine position.  She was given a MAC anesthetic, with supplemental oxygen via nasal cannula.  The patient was re-positioned into the dorsal lithotomy position.  Her legs were placed in Yellofin stirrups, and her arms were left out to the  side.  We took care in positioning both the arms and legs, padding any potential pressure points.  In sterile fashion, the patient had an in and out catheterization to drain her bladder.  The perineum was prepped and draped in the usual fashion.      After a surgical time out was performed, I inserted a coated speculum inside the vagina to visualize the cervix.  The cervix was very shortened, nearly being flush with the vagina.  Colposcopy was carried out revealing no acetowhite epithelium.  The cervix was counterstained with Lugol's strong iodine solution, confirming my initial findings.  The colposcopy was inadequate, likely from the transformation zone receding into the endocervical canal.  This was not unexpected and I have previously counseled the patient this would likely be the case and had recommended proceeding with excision due to the high-grade Pap smear.  I performed a paracervical block using 10 ml of 0.5% Marcaine with Epinephrine. I called for the 12 x 15 mm tungsten loop electrode. I performed a single pass LEEP.  I started from the patient's left side and moved to the right side.  I was careful not to excise too deep given the shortened cervix. I carried out a systematic curettage of the endocervix, passing this off labeled ECC.  I switched from the loop electrode to the long Bovie tip, and cauterized the entire cervical base.  There was excellent hemostasis at this point.  I applied AstrinGyn to the entire cervical LEEP bed, to further aid in hemostasis.  After observing this area for several minutes, there was no evidence of any bleeding. This ended my procedure.  The coated speculum was removed from the vagina.  The patient was taken out of lithotomy position and awoken from her MAC anesthetic without difficulty.  She was transferred to the recovery room in satisfactory condition.  All counts were correct x 2. The patient did not require preoperative antibiotics.      The patient will be  discharged home when she meets discharge criteria.  The patient will follow-up with me in approximately 2-3 weeks.  An appointment will be made for her prior to her discharge.  The patient was instructed to call for any of the following:  Temperature greater than 100 degrees Fahrenheit, shortness of breath or chest pain, excessive nausea, vomiting or inability to tolerate oral intake, pain is worsening despite current pain medication, any foul smelling vaginal discharge, heavy vaginal bleeding, or any other concerns.      El Álvarez MD     Date: 1/6/2022  Time: 13:25 EST

## 2022-01-11 LAB
CYTO UR: NORMAL
LAB AP CASE REPORT: NORMAL
LAB AP CLINICAL INFORMATION: NORMAL
LAB AP SPECIAL STAINS: NORMAL
PATH REPORT.FINAL DX SPEC: NORMAL
PATH REPORT.GROSS SPEC: NORMAL

## 2022-01-19 ENCOUNTER — OFFICE VISIT (OUTPATIENT)
Dept: OBSTETRICS AND GYNECOLOGY | Facility: CLINIC | Age: 77
End: 2022-01-19

## 2022-01-19 VITALS
SYSTOLIC BLOOD PRESSURE: 126 MMHG | DIASTOLIC BLOOD PRESSURE: 78 MMHG | HEART RATE: 89 BPM | WEIGHT: 118 LBS | BODY MASS INDEX: 20.9 KG/M2

## 2022-01-19 DIAGNOSIS — N87.0 DYSPLASIA OF CERVIX, LOW GRADE (CIN 1): ICD-10-CM

## 2022-01-19 DIAGNOSIS — R87.611 ATYPICAL SQUAMOUS CELLS CANNOT EXCLUDE HIGH GRADE SQUAMOUS INTRAEPITHELIAL LESION ON CYTOLOGIC SMEAR OF CERVIX (ASC-H): ICD-10-CM

## 2022-01-19 DIAGNOSIS — Z48.89 POSTOPERATIVE VISIT: Primary | ICD-10-CM

## 2022-01-19 PROCEDURE — 99213 OFFICE O/P EST LOW 20 MIN: CPT | Performed by: OBSTETRICS & GYNECOLOGY

## 2022-01-19 NOTE — PROGRESS NOTES
Post Operative Visit      CC: Post operative follow up    HPI:   Pain:  No  Vaginal bleeding:  No  Vaginal discharge:  No  Fever/chills:  No  Good appetite:  Yes  Normal bladder function:  Yes  Normal bowel function:  Yes  Hot flashes: No    Operative report, surgical findings and any pathology reviewed.    /78   Pulse 89   Wt 53.5 kg (118 lb)   BMI 20.90 kg/m²     Physical Exam  Vitals and nursing note reviewed.   Constitutional:       General: She is not in acute distress.     Appearance: Normal appearance. She is normal weight. She is not ill-appearing.   Neck:      Thyroid: No thyroid mass or thyromegaly.   Abdominal:      General: Abdomen is flat. There is no distension.      Palpations: Abdomen is soft. There is no mass.      Tenderness: There is no abdominal tenderness. There is no guarding or rebound.      Hernia: No hernia is present.   Musculoskeletal:         General: No swelling.      Right lower leg: No edema.      Left lower leg: No edema.   Skin:     General: Skin is warm and dry.      Findings: No rash.   Neurological:      Mental Status: She is alert and oriented to person, place, and time.   Psychiatric:         Mood and Affect: Mood normal.         Behavior: Behavior normal.         Thought Content: Thought content normal.         ASSESSMENT:  Post-operative exam    Diagnoses and all orders for this visit:    1. Postoperative visit (Primary)  Assessment & Plan:  Stable postoperative course  May resume all normal activities  Ibuprofen and/or Tylenol as needed for any pelvic cramping or pain      2. Atypical squamous cells cannot exclude high grade squamous intraepithelial lesion on cytologic smear of cervix (ASC-H)    3. Dysplasia of cervix, low grade (CASH 1)  Overview:  CASH-1 on LEEP    Assessment & Plan:  Reviewed pathology findings with the patient.  Discussed that it would be important for her to undergo yearly Pap smears from this point on.  Discussed the importance of keeping her  appointments for Pap smears.  Discussed that this would be to evaluate for recurrence.  Recommend her next Pap smear be in 1 year.  Patient expresses her understanding.          PLAN:    Any available photos and/or pathology were reviewed.  All questions answered.     Ok to resume normal activities  Ok to resume intercourse  Return to school/work without limitations    Follow Up:  Return for Recheck.    El Álvarez MD  01/19/2022

## 2022-01-20 PROBLEM — N87.0 DYSPLASIA OF CERVIX, LOW GRADE (CIN 1): Status: ACTIVE | Noted: 2022-01-20

## 2022-01-20 PROBLEM — Z48.89 POSTOPERATIVE VISIT: Status: ACTIVE | Noted: 2022-01-20

## 2022-01-20 NOTE — ASSESSMENT & PLAN NOTE
Stable postoperative course  May resume all normal activities  Ibuprofen and/or Tylenol as needed for any pelvic cramping or pain

## 2022-01-20 NOTE — ASSESSMENT & PLAN NOTE
Reviewed pathology findings with the patient.  Discussed that it would be important for her to undergo yearly Pap smears from this point on.  Discussed the importance of keeping her appointments for Pap smears.  Discussed that this would be to evaluate for recurrence.  Recommend her next Pap smear be in 1 year.  Patient expresses her understanding.

## 2022-03-18 ENCOUNTER — LAB (OUTPATIENT)
Dept: LAB | Facility: HOSPITAL | Age: 77
End: 2022-03-18

## 2022-03-18 ENCOUNTER — TRANSCRIBE ORDERS (OUTPATIENT)
Dept: ADMINISTRATIVE | Facility: HOSPITAL | Age: 77
End: 2022-03-18

## 2022-03-18 DIAGNOSIS — Z79.899 ENCOUNTER FOR LONG-TERM (CURRENT) USE OF OTHER MEDICATIONS: Primary | ICD-10-CM

## 2022-03-18 DIAGNOSIS — Z79.899 ENCOUNTER FOR LONG-TERM (CURRENT) USE OF OTHER MEDICATIONS: ICD-10-CM

## 2022-03-18 DIAGNOSIS — M81.0 SENILE OSTEOPOROSIS: ICD-10-CM

## 2022-03-18 LAB
25(OH)D3 SERPL-MCNC: 58.1 NG/ML (ref 30–100)
CALCIUM SPEC-SCNC: 9.7 MG/DL (ref 8.6–10.5)
CREAT SERPL-MCNC: 1.11 MG/DL (ref 0.57–1)
EGFRCR SERPLBLD CKD-EPI 2021: 51.9 ML/MIN/1.73

## 2022-03-18 PROCEDURE — 82306 VITAMIN D 25 HYDROXY: CPT

## 2022-03-18 PROCEDURE — 82565 ASSAY OF CREATININE: CPT

## 2022-03-18 PROCEDURE — 82310 ASSAY OF CALCIUM: CPT

## 2022-03-18 PROCEDURE — 36415 COLL VENOUS BLD VENIPUNCTURE: CPT

## 2022-04-12 ENCOUNTER — TRANSCRIBE ORDERS (OUTPATIENT)
Dept: ADMINISTRATIVE | Facility: HOSPITAL | Age: 77
End: 2022-04-12

## 2022-04-12 DIAGNOSIS — M81.0 POST-MENOPAUSAL OSTEOPOROSIS: Primary | ICD-10-CM

## 2022-05-13 ENCOUNTER — HOSPITAL ENCOUNTER (EMERGENCY)
Facility: HOSPITAL | Age: 77
Discharge: LEFT BEFORE DISCHARGE | End: 2022-05-13

## 2022-05-13 VITALS
TEMPERATURE: 98.1 F | RESPIRATION RATE: 20 BRPM | WEIGHT: 114.86 LBS | SYSTOLIC BLOOD PRESSURE: 145 MMHG | OXYGEN SATURATION: 100 % | HEIGHT: 60 IN | BODY MASS INDEX: 22.55 KG/M2 | HEART RATE: 83 BPM | DIASTOLIC BLOOD PRESSURE: 73 MMHG

## 2022-05-13 LAB
ALBUMIN SERPL-MCNC: 4.5 G/DL (ref 3.5–5.2)
ALBUMIN/GLOB SERPL: 2 G/DL
ALP SERPL-CCNC: 78 U/L (ref 39–117)
ALT SERPL W P-5'-P-CCNC: 7 U/L (ref 1–33)
ANION GAP SERPL CALCULATED.3IONS-SCNC: 12 MMOL/L (ref 5–15)
AST SERPL-CCNC: 16 U/L (ref 1–32)
BACTERIA UR QL AUTO: ABNORMAL /HPF
BASOPHILS # BLD AUTO: 0.04 10*3/MM3 (ref 0–0.2)
BASOPHILS NFR BLD AUTO: 0.6 % (ref 0–1.5)
BILIRUB SERPL-MCNC: 0.4 MG/DL (ref 0–1.2)
BILIRUB UR QL STRIP: NEGATIVE
BUN SERPL-MCNC: 15 MG/DL (ref 8–23)
BUN/CREAT SERPL: 12.5 (ref 7–25)
CALCIUM SPEC-SCNC: 9.5 MG/DL (ref 8.6–10.5)
CHLORIDE SERPL-SCNC: 104 MMOL/L (ref 98–107)
CLARITY UR: CLEAR
CO2 SERPL-SCNC: 25 MMOL/L (ref 22–29)
COLOR UR: YELLOW
CREAT SERPL-MCNC: 1.2 MG/DL (ref 0.57–1)
DEPRECATED RDW RBC AUTO: 43.4 FL (ref 37–54)
EGFRCR SERPLBLD CKD-EPI 2021: 47.3 ML/MIN/1.73
EOSINOPHIL # BLD AUTO: 0.11 10*3/MM3 (ref 0–0.4)
EOSINOPHIL NFR BLD AUTO: 1.7 % (ref 0.3–6.2)
ERYTHROCYTE [DISTWIDTH] IN BLOOD BY AUTOMATED COUNT: 13.1 % (ref 12.3–15.4)
GLOBULIN UR ELPH-MCNC: 2.3 GM/DL
GLUCOSE SERPL-MCNC: 102 MG/DL (ref 65–99)
GLUCOSE UR STRIP-MCNC: NEGATIVE MG/DL
HCT VFR BLD AUTO: 34.9 % (ref 34–46.6)
HGB BLD-MCNC: 11.1 G/DL (ref 12–15.9)
HGB UR QL STRIP.AUTO: NEGATIVE
HOLD SPECIMEN: NORMAL
HOLD SPECIMEN: NORMAL
HYALINE CASTS UR QL AUTO: ABNORMAL /LPF
IMM GRANULOCYTES # BLD AUTO: 0.02 10*3/MM3 (ref 0–0.05)
IMM GRANULOCYTES NFR BLD AUTO: 0.3 % (ref 0–0.5)
KETONES UR QL STRIP: NEGATIVE
LEUKOCYTE ESTERASE UR QL STRIP.AUTO: ABNORMAL
LIPASE SERPL-CCNC: 22 U/L (ref 13–60)
LYMPHOCYTES # BLD AUTO: 1.36 10*3/MM3 (ref 0.7–3.1)
LYMPHOCYTES NFR BLD AUTO: 21.1 % (ref 19.6–45.3)
MCH RBC QN AUTO: 29.1 PG (ref 26.6–33)
MCHC RBC AUTO-ENTMCNC: 31.8 G/DL (ref 31.5–35.7)
MCV RBC AUTO: 91.4 FL (ref 79–97)
MONOCYTES # BLD AUTO: 0.5 10*3/MM3 (ref 0.1–0.9)
MONOCYTES NFR BLD AUTO: 7.7 % (ref 5–12)
NEUTROPHILS NFR BLD AUTO: 4.43 10*3/MM3 (ref 1.7–7)
NEUTROPHILS NFR BLD AUTO: 68.6 % (ref 42.7–76)
NITRITE UR QL STRIP: NEGATIVE
NRBC BLD AUTO-RTO: 0 /100 WBC (ref 0–0.2)
PH UR STRIP.AUTO: 6 [PH] (ref 5–8)
PLATELET # BLD AUTO: 210 10*3/MM3 (ref 140–450)
PMV BLD AUTO: 10.4 FL (ref 6–12)
POTASSIUM SERPL-SCNC: 4 MMOL/L (ref 3.5–5.2)
PROT SERPL-MCNC: 6.8 G/DL (ref 6–8.5)
PROT UR QL STRIP: NEGATIVE
RBC # BLD AUTO: 3.82 10*6/MM3 (ref 3.77–5.28)
RBC # UR STRIP: ABNORMAL /HPF
REF LAB TEST METHOD: ABNORMAL
SODIUM SERPL-SCNC: 141 MMOL/L (ref 136–145)
SP GR UR STRIP: 1.01 (ref 1–1.03)
SQUAMOUS #/AREA URNS HPF: ABNORMAL /HPF
UROBILINOGEN UR QL STRIP: ABNORMAL
WBC # UR STRIP: ABNORMAL /HPF
WBC NRBC COR # BLD: 6.46 10*3/MM3 (ref 3.4–10.8)
WHOLE BLOOD HOLD COAG: NORMAL
WHOLE BLOOD HOLD SPECIMEN: NORMAL

## 2022-05-13 PROCEDURE — 36415 COLL VENOUS BLD VENIPUNCTURE: CPT

## 2022-05-13 PROCEDURE — 85025 COMPLETE CBC W/AUTO DIFF WBC: CPT

## 2022-05-13 PROCEDURE — 83690 ASSAY OF LIPASE: CPT

## 2022-05-13 PROCEDURE — 99211 OFF/OP EST MAY X REQ PHY/QHP: CPT

## 2022-05-13 PROCEDURE — 80053 COMPREHEN METABOLIC PANEL: CPT

## 2022-05-13 PROCEDURE — 81001 URINALYSIS AUTO W/SCOPE: CPT

## 2022-05-13 RX ORDER — SODIUM CHLORIDE 0.9 % (FLUSH) 0.9 %
10 SYRINGE (ML) INJECTION AS NEEDED
Status: DISCONTINUED | OUTPATIENT
Start: 2022-05-13 | End: 2022-05-14 | Stop reason: HOSPADM

## 2022-08-29 ENCOUNTER — LAB (OUTPATIENT)
Dept: LAB | Facility: HOSPITAL | Age: 77
End: 2022-08-29

## 2022-08-29 ENCOUNTER — HOSPITAL ENCOUNTER (OUTPATIENT)
Dept: BONE DENSITY | Facility: HOSPITAL | Age: 77
Discharge: HOME | End: 2022-08-29

## 2022-08-29 ENCOUNTER — TRANSCRIBE ORDERS (OUTPATIENT)
Dept: ADMINISTRATIVE | Facility: HOSPITAL | Age: 77
End: 2022-08-29

## 2022-08-29 DIAGNOSIS — Z79.899 ENCOUNTER FOR LONG-TERM (CURRENT) USE OF OTHER MEDICATIONS: ICD-10-CM

## 2022-08-29 DIAGNOSIS — M81.0 SENILE OSTEOPOROSIS: ICD-10-CM

## 2022-08-29 DIAGNOSIS — M81.0 POST-MENOPAUSAL OSTEOPOROSIS: ICD-10-CM

## 2022-08-29 DIAGNOSIS — M81.0 SENILE OSTEOPOROSIS: Primary | ICD-10-CM

## 2022-08-29 LAB
25(OH)D3 SERPL-MCNC: 62.4 NG/ML (ref 30–100)
CALCIUM SPEC-SCNC: 9.3 MG/DL (ref 8.6–10.5)
CREAT SERPL-MCNC: 1.09 MG/DL (ref 0.57–1)
EGFRCR SERPLBLD CKD-EPI 2021: 53.1 ML/MIN/1.73

## 2022-08-29 PROCEDURE — 82565 ASSAY OF CREATININE: CPT

## 2022-08-29 PROCEDURE — 36415 COLL VENOUS BLD VENIPUNCTURE: CPT

## 2022-08-29 PROCEDURE — 82306 VITAMIN D 25 HYDROXY: CPT

## 2022-08-29 PROCEDURE — 82310 ASSAY OF CALCIUM: CPT

## 2022-08-29 PROCEDURE — 77080 DXA BONE DENSITY AXIAL: CPT

## 2022-09-14 ENCOUNTER — TRANSCRIBE ORDERS (OUTPATIENT)
Dept: ADMINISTRATIVE | Facility: HOSPITAL | Age: 77
End: 2022-09-14

## 2022-09-14 DIAGNOSIS — Z78.0 POST-MENOPAUSAL: ICD-10-CM

## 2022-09-14 DIAGNOSIS — M81.0 SENILE OSTEOPOROSIS: Primary | ICD-10-CM

## 2022-09-15 PROBLEM — M81.0 POSTMENOPAUSAL OSTEOPOROSIS: Status: ACTIVE | Noted: 2022-09-15

## 2022-09-21 ENCOUNTER — HOSPITAL ENCOUNTER (OUTPATIENT)
Dept: INFUSION THERAPY | Facility: HOSPITAL | Age: 77
Discharge: HOME | End: 2022-09-21
Admitting: INTERNAL MEDICINE

## 2022-09-21 VITALS
WEIGHT: 116.62 LBS | TEMPERATURE: 98.3 F | HEART RATE: 85 BPM | HEIGHT: 64 IN | DIASTOLIC BLOOD PRESSURE: 62 MMHG | SYSTOLIC BLOOD PRESSURE: 130 MMHG | BODY MASS INDEX: 19.91 KG/M2 | OXYGEN SATURATION: 99 % | RESPIRATION RATE: 20 BRPM

## 2022-09-21 DIAGNOSIS — M81.0 POSTMENOPAUSAL OSTEOPOROSIS: Primary | ICD-10-CM

## 2022-09-21 PROCEDURE — 25010000002 DENOSUMAB 60 MG/ML SOLUTION PREFILLED SYRINGE: Performed by: INTERNAL MEDICINE

## 2022-09-21 PROCEDURE — 96372 THER/PROPH/DIAG INJ SC/IM: CPT

## 2022-09-21 RX ADMIN — DENOSUMAB 60 MG: 60 INJECTION SUBCUTANEOUS at 14:15

## 2022-10-06 ENCOUNTER — TELEPHONE (OUTPATIENT)
Dept: OBSTETRICS AND GYNECOLOGY | Facility: CLINIC | Age: 77
End: 2022-10-06

## 2022-10-06 NOTE — TELEPHONE ENCOUNTER
Caller: Devi Bobo    Relationship to patient: Self    Best call back number: [phone]    Type of visit: ANNUAL      If rescheduling, when is the original appointment: 10/6/22      Additional notes: PT IS CALLING IN TO CANCEL SAME DAY APPT - SHE DOES NOT HAVE TRANSPORTATION FOR TODAY - SHE HAS BEEN RESCHEDULED FOR 10/13/22

## 2022-10-13 ENCOUNTER — OFFICE VISIT (OUTPATIENT)
Dept: OBSTETRICS AND GYNECOLOGY | Facility: CLINIC | Age: 77
End: 2022-10-13

## 2022-10-13 VITALS
HEART RATE: 82 BPM | DIASTOLIC BLOOD PRESSURE: 81 MMHG | BODY MASS INDEX: 19.71 KG/M2 | SYSTOLIC BLOOD PRESSURE: 134 MMHG | WEIGHT: 114.8 LBS

## 2022-10-13 DIAGNOSIS — N87.0 DYSPLASIA OF CERVIX, LOW GRADE (CIN 1): ICD-10-CM

## 2022-10-13 DIAGNOSIS — Z01.419 ENCOUNTER FOR GYNECOLOGICAL EXAMINATION WITHOUT ABNORMAL FINDING: Primary | ICD-10-CM

## 2022-10-13 DIAGNOSIS — Z12.4 ENCOUNTER FOR SCREENING FOR MALIGNANT NEOPLASM OF CERVIX: ICD-10-CM

## 2022-10-13 DIAGNOSIS — Z12.31 ENCOUNTER FOR SCREENING MAMMOGRAM FOR MALIGNANT NEOPLASM OF BREAST: ICD-10-CM

## 2022-10-13 PROCEDURE — G0123 SCREEN CERV/VAG THIN LAYER: HCPCS | Performed by: NURSE PRACTITIONER

## 2022-10-13 PROCEDURE — 87624 HPV HI-RISK TYP POOLED RSLT: CPT | Performed by: NURSE PRACTITIONER

## 2022-10-13 PROCEDURE — G0101 CA SCREEN;PELVIC/BREAST EXAM: HCPCS | Performed by: NURSE PRACTITIONER

## 2022-10-13 RX ORDER — HYDROCODONE BITARTRATE AND ACETAMINOPHEN 5; 325 MG/1; MG/1
1 TABLET ORAL EVERY 8 HOURS PRN
COMMUNITY
Start: 2022-10-11

## 2022-10-13 NOTE — PROGRESS NOTES
Well Woman Visit    CC: Scheduled annual well gyn visit  Chief Complaint   Patient presents with   • Gynecologic Exam     wwe         Post menopausal, using vaginal estrogen only  Social History     Substance and Sexual Activity   Sexual Activity Not Currently       HPI:   77 y.o.     Denies any vaginal bleeding    Pain:  occasional pelvic pain    PCP: does manage PMHx and preventative labs  History: PMHx, Meds, Allergies, PSHx, Social Hx, and POBHx all reviewed and updated.    Pt has concerns she would like to discuss. Occasional pelvic pain, using vaginal estrogen sporadically    PHYSICAL EXAM:  /81 (BP Location: Left arm, Patient Position: Sitting, Cuff Size: Small Adult)   Pulse 82   Wt 52.1 kg (114 lb 12.8 oz)   BMI 19.71 kg/m²  Not found.  General- NAD, alert and oriented, appropriate  Psych- Normal mood, good memory  Neck- No masses, no thyroid enlargement  CV- Regular rhythm, no murnurs  Resp- CTA to bases, no wheezes  Abdomen- Soft, non distended, non tender, no masses    Breast left-  Bilaterally symmetrical, no masses, non tender, no nipple discharge  Breast right- Bilaterally symmetrical, no masses, non tender, no nipple discharge    External genitalia- Normal female, no lesions  Urethra/meatus- Normal, no masses, non tender  Bladder- Normal, no masses, non tender  Vagina- Normal, severe atrophy, no lesions, no discharge.  Prolapse: none  Cvx- Stenotic cervix  Uterus- Normal size, shape & consistency.  Non tender, mobile, & no prolapse  Adnexa- No mass, non tender  Anus/Rectum/Perineum- Normal appearance, no mass, good sphincter tone, no hemorrhoids, no prolapse    Lymphatic- No palpable neck, axillary, or groin nodes  Ext- No edema, no cyanosis    Skin- No lesions, no rashes, no acanthosis nigricans      ASSESSMENT and PLAN:    Diagnoses and all orders for this visit:    1. Encounter for gynecological examination without abnormal finding (Primary)  -     Mammo Screening Digital  Tomosynthesis Bilateral With CAD; Future    2. Dysplasia of cervix, low grade (CASH 1)  Overview:  CASH-1 on LEEP    Orders:  -     IgP, Aptima HPV    3. Encounter for screening for malignant neoplasm of cervix  -     IgP, Aptima HPV    4. Encounter for screening mammogram for malignant neoplasm of breast  -     Mammo Screening Digital Tomosynthesis Bilateral With CAD; Future      Preventative:  • BREAST HEALTH- Monthly self breast exam importance and how to reviewed. MMG and/or MRI (prn) reviewed per society guidelines and her individual history. Screen: Pt will call to schedule  • CERVICAL CANCER Screening- Reviewed current ASCCP guidelines for screening w and wo cotest HPV, age specific.  Screen: Updated today  • COLON CANCER Screening- Reviewed current medical society guidelines and options.  Screen:  Already up to date  • VACCINATIONS Recommended: COVID and booster PRN, Flu annually.  Importance discussed, risk being unvaccinated reviewed.  Questions answered  • Smoking status- NON SMOKER    Encouraged to use vaginal estrogen, also encouraged to increase water intake.  She understands the importance of having any ordered tests to be performed in a timely fashion.  The risks of not performing them include, but are not limited to, advanced cancer stages, bone loss from osteoporosis and/or subsequent increase in morbidity and/or mortality.  She is encouraged to review her results online and/or contact or office if she has questions.     Follow Up:  Return in about 1 year (around 10/13/2023) for Annual physical.      Sanjiv Kelly, APRN  10/13/2022    [facility]  [facility]  [address] [place] [address]  Dept: [phone]  Loc: [phone]

## 2022-10-20 LAB
CYTOLOGIST CVX/VAG CYTO: NORMAL
CYTOLOGY CVX/VAG DOC CYTO: NORMAL
CYTOLOGY CVX/VAG DOC THIN PREP: NORMAL
DX ICD CODE: NORMAL
HIV 1 & 2 AB SER-IMP: NORMAL
HPV I/H RISK 4 DNA CVX QL PROBE+SIG AMP: NEGATIVE
OTHER STN SPEC: NORMAL
PATHOLOGIST CVX/VAG CYTO: NORMAL
STAT OF ADQ CVX/VAG CYTO-IMP: NORMAL

## 2022-10-20 NOTE — PROGRESS NOTES
"Called patient voicemail not set up yet and mychart is not active. \"HUB to READ\" if patient calls back pap smear is negative. "

## 2022-10-24 ENCOUNTER — TELEPHONE (OUTPATIENT)
Dept: OBSTETRICS AND GYNECOLOGY | Facility: CLINIC | Age: 77
End: 2022-10-24

## 2022-10-26 ENCOUNTER — TELEPHONE (OUTPATIENT)
Dept: OBSTETRICS AND GYNECOLOGY | Facility: CLINIC | Age: 77
End: 2022-10-26

## 2023-01-06 DIAGNOSIS — N95.2 VAGINAL ATROPHY: ICD-10-CM

## 2023-01-06 RX ORDER — ESTRADIOL 0.1 MG/G
CREAM VAGINAL
Qty: 42 G | Refills: 6 | Status: SHIPPED | OUTPATIENT
Start: 2023-01-06

## 2023-03-31 ENCOUNTER — APPOINTMENT (OUTPATIENT)
Dept: GENERAL RADIOLOGY | Facility: HOSPITAL | Age: 78
DRG: 871 | End: 2023-03-31
Payer: MEDICARE

## 2023-03-31 ENCOUNTER — HOSPITAL ENCOUNTER (INPATIENT)
Facility: HOSPITAL | Age: 78
LOS: 2 days | Discharge: HOME | DRG: 871 | End: 2023-04-03
Attending: EMERGENCY MEDICINE
Payer: MEDICARE

## 2023-03-31 DIAGNOSIS — N39.0 ACUTE UTI: ICD-10-CM

## 2023-03-31 DIAGNOSIS — Z78.9 IMPAIRED MOBILITY AND ADLS: ICD-10-CM

## 2023-03-31 DIAGNOSIS — A41.9 SEPSIS, DUE TO UNSPECIFIED ORGANISM, UNSPECIFIED WHETHER ACUTE ORGAN DYSFUNCTION PRESENT: Primary | ICD-10-CM

## 2023-03-31 DIAGNOSIS — Z74.09 IMPAIRED MOBILITY AND ADLS: ICD-10-CM

## 2023-03-31 DIAGNOSIS — R50.9 FEVER, UNSPECIFIED FEVER CAUSE: ICD-10-CM

## 2023-03-31 DIAGNOSIS — R53.1 GENERALIZED WEAKNESS: ICD-10-CM

## 2023-03-31 LAB
ALBUMIN SERPL-MCNC: 4.3 G/DL (ref 3.5–5.2)
ALBUMIN/GLOB SERPL: 1.7 G/DL
ALP SERPL-CCNC: 119 U/L (ref 39–117)
ALT SERPL W P-5'-P-CCNC: 18 U/L (ref 1–33)
ANION GAP SERPL CALCULATED.3IONS-SCNC: 12 MMOL/L (ref 5–15)
AST SERPL-CCNC: 47 U/L (ref 1–32)
BACTERIA UR QL AUTO: ABNORMAL /HPF
BASOPHILS # BLD AUTO: 0.05 10*3/MM3 (ref 0–0.2)
BASOPHILS NFR BLD AUTO: 0.6 % (ref 0–1.5)
BILIRUB SERPL-MCNC: 0.6 MG/DL (ref 0–1.2)
BILIRUB UR QL STRIP: NEGATIVE
BUN SERPL-MCNC: 12 MG/DL (ref 8–23)
BUN/CREAT SERPL: 9.5 (ref 7–25)
CALCIUM SPEC-SCNC: 9 MG/DL (ref 8.6–10.5)
CHLORIDE SERPL-SCNC: 99 MMOL/L (ref 98–107)
CLARITY UR: CLEAR
CO2 SERPL-SCNC: 24 MMOL/L (ref 22–29)
COLOR UR: YELLOW
CREAT SERPL-MCNC: 1.26 MG/DL (ref 0.57–1)
D-LACTATE SERPL-SCNC: 1.5 MMOL/L (ref 0.5–2)
DEPRECATED RDW RBC AUTO: 44.1 FL (ref 37–54)
EGFRCR SERPLBLD CKD-EPI 2021: 44.3 ML/MIN/1.73
EOSINOPHIL # BLD AUTO: 0.16 10*3/MM3 (ref 0–0.4)
EOSINOPHIL NFR BLD AUTO: 2 % (ref 0.3–6.2)
ERYTHROCYTE [DISTWIDTH] IN BLOOD BY AUTOMATED COUNT: 13.4 % (ref 12.3–15.4)
FLUAV AG NPH QL: NEGATIVE
FLUBV AG NPH QL IA: NEGATIVE
GLOBULIN UR ELPH-MCNC: 2.6 GM/DL
GLUCOSE SERPL-MCNC: 121 MG/DL (ref 65–99)
GLUCOSE UR STRIP-MCNC: NEGATIVE MG/DL
HCT VFR BLD AUTO: 35.8 % (ref 34–46.6)
HGB BLD-MCNC: 11.9 G/DL (ref 12–15.9)
HGB UR QL STRIP.AUTO: NEGATIVE
HOLD SPECIMEN: NORMAL
HOLD SPECIMEN: NORMAL
HYALINE CASTS UR QL AUTO: ABNORMAL /LPF
IMM GRANULOCYTES # BLD AUTO: 0.02 10*3/MM3 (ref 0–0.05)
IMM GRANULOCYTES NFR BLD AUTO: 0.3 % (ref 0–0.5)
KETONES UR QL STRIP: NEGATIVE
LEUKOCYTE ESTERASE UR QL STRIP.AUTO: ABNORMAL
LYMPHOCYTES # BLD AUTO: 0.29 10*3/MM3 (ref 0.7–3.1)
LYMPHOCYTES NFR BLD AUTO: 3.6 % (ref 19.6–45.3)
MCH RBC QN AUTO: 29.7 PG (ref 26.6–33)
MCHC RBC AUTO-ENTMCNC: 33.2 G/DL (ref 31.5–35.7)
MCV RBC AUTO: 89.3 FL (ref 79–97)
MONOCYTES # BLD AUTO: 0.57 10*3/MM3 (ref 0.1–0.9)
MONOCYTES NFR BLD AUTO: 7.1 % (ref 5–12)
NEUTROPHILS NFR BLD AUTO: 6.9 10*3/MM3 (ref 1.7–7)
NEUTROPHILS NFR BLD AUTO: 86.4 % (ref 42.7–76)
NITRITE UR QL STRIP: NEGATIVE
NRBC BLD AUTO-RTO: 0 /100 WBC (ref 0–0.2)
PH UR STRIP.AUTO: 7 [PH] (ref 5–8)
PLATELET # BLD AUTO: 169 10*3/MM3 (ref 140–450)
PMV BLD AUTO: 10.3 FL (ref 6–12)
POTASSIUM SERPL-SCNC: 3.9 MMOL/L (ref 3.5–5.2)
PROT SERPL-MCNC: 6.9 G/DL (ref 6–8.5)
PROT UR QL STRIP: NEGATIVE
RBC # BLD AUTO: 4.01 10*6/MM3 (ref 3.77–5.28)
RBC # UR STRIP: ABNORMAL /HPF
REF LAB TEST METHOD: ABNORMAL
SODIUM SERPL-SCNC: 135 MMOL/L (ref 136–145)
SP GR UR STRIP: 1.01 (ref 1–1.03)
SQUAMOUS #/AREA URNS HPF: ABNORMAL /HPF
UROBILINOGEN UR QL STRIP: ABNORMAL
WBC # UR STRIP: ABNORMAL /HPF
WBC NRBC COR # BLD: 7.99 10*3/MM3 (ref 3.4–10.8)
WHOLE BLOOD HOLD COAG: NORMAL
WHOLE BLOOD HOLD SPECIMEN: NORMAL

## 2023-03-31 PROCEDURE — U0004 COV-19 TEST NON-CDC HGH THRU: HCPCS | Performed by: EMERGENCY MEDICINE

## 2023-03-31 PROCEDURE — 71045 X-RAY EXAM CHEST 1 VIEW: CPT

## 2023-03-31 PROCEDURE — 83605 ASSAY OF LACTIC ACID: CPT

## 2023-03-31 PROCEDURE — 81001 URINALYSIS AUTO W/SCOPE: CPT | Performed by: EMERGENCY MEDICINE

## 2023-03-31 PROCEDURE — 87040 BLOOD CULTURE FOR BACTERIA: CPT

## 2023-03-31 PROCEDURE — 25010000002 CEFTRIAXONE PER 250 MG: Performed by: EMERGENCY MEDICINE

## 2023-03-31 PROCEDURE — 80053 COMPREHEN METABOLIC PANEL: CPT

## 2023-03-31 PROCEDURE — 85025 COMPLETE CBC W/AUTO DIFF WBC: CPT

## 2023-03-31 PROCEDURE — P9612 CATHETERIZE FOR URINE SPEC: HCPCS

## 2023-03-31 PROCEDURE — 99285 EMERGENCY DEPT VISIT HI MDM: CPT

## 2023-03-31 PROCEDURE — U0005 INFEC AGEN DETEC AMPLI PROBE: HCPCS | Performed by: EMERGENCY MEDICINE

## 2023-03-31 PROCEDURE — 87804 INFLUENZA ASSAY W/OPTIC: CPT | Performed by: EMERGENCY MEDICINE

## 2023-03-31 RX ORDER — NITROFURANTOIN 25; 75 MG/1; MG/1
100 CAPSULE ORAL 2 TIMES DAILY
COMMUNITY
End: 2023-04-03 | Stop reason: HOSPADM

## 2023-03-31 RX ORDER — ACETAMINOPHEN 325 MG/1
975 TABLET ORAL ONCE
Status: COMPLETED | OUTPATIENT
Start: 2023-03-31 | End: 2023-03-31

## 2023-03-31 RX ORDER — SODIUM CHLORIDE 0.9 % (FLUSH) 0.9 %
10 SYRINGE (ML) INJECTION AS NEEDED
Status: DISCONTINUED | OUTPATIENT
Start: 2023-03-31 | End: 2023-04-03 | Stop reason: HOSPADM

## 2023-03-31 RX ORDER — CEFTRIAXONE SODIUM 1 G/50ML
1 INJECTION, SOLUTION INTRAVENOUS ONCE
Status: COMPLETED | OUTPATIENT
Start: 2023-03-31 | End: 2023-03-31

## 2023-03-31 RX ADMIN — SODIUM CHLORIDE, POTASSIUM CHLORIDE, SODIUM LACTATE AND CALCIUM CHLORIDE 1470 ML: 600; 310; 30; 20 INJECTION, SOLUTION INTRAVENOUS at 22:55

## 2023-03-31 RX ADMIN — ACETAMINOPHEN 975 MG: 325 TABLET ORAL at 22:32

## 2023-03-31 RX ADMIN — CEFTRIAXONE SODIUM 1 G: 1 INJECTION, SOLUTION INTRAVENOUS at 22:28

## 2023-04-01 PROBLEM — E43 SEVERE MALNUTRITION: Status: ACTIVE | Noted: 2023-04-01

## 2023-04-01 LAB
ALBUMIN SERPL-MCNC: 3.2 G/DL (ref 3.5–5.2)
ALBUMIN/GLOB SERPL: 1.6 G/DL
ALP SERPL-CCNC: 87 U/L (ref 39–117)
ALT SERPL W P-5'-P-CCNC: 13 U/L (ref 1–33)
ANION GAP SERPL CALCULATED.3IONS-SCNC: 7.6 MMOL/L (ref 5–15)
AST SERPL-CCNC: 30 U/L (ref 1–32)
BASOPHILS # BLD AUTO: 0.06 10*3/MM3 (ref 0–0.2)
BASOPHILS NFR BLD AUTO: 1.2 % (ref 0–1.5)
BILIRUB SERPL-MCNC: 0.4 MG/DL (ref 0–1.2)
BUN SERPL-MCNC: 11 MG/DL (ref 8–23)
BUN/CREAT SERPL: 10.1 (ref 7–25)
CALCIUM SPEC-SCNC: 8.3 MG/DL (ref 8.6–10.5)
CHLORIDE SERPL-SCNC: 106 MMOL/L (ref 98–107)
CO2 SERPL-SCNC: 27.4 MMOL/L (ref 22–29)
CREAT SERPL-MCNC: 1.09 MG/DL (ref 0.57–1)
DEPRECATED RDW RBC AUTO: 46.3 FL (ref 37–54)
EGFRCR SERPLBLD CKD-EPI 2021: 52.8 ML/MIN/1.73
EOSINOPHIL # BLD AUTO: 0.32 10*3/MM3 (ref 0–0.4)
EOSINOPHIL NFR BLD AUTO: 6.2 % (ref 0.3–6.2)
ERYTHROCYTE [DISTWIDTH] IN BLOOD BY AUTOMATED COUNT: 13.6 % (ref 12.3–15.4)
GLOBULIN UR ELPH-MCNC: 2 GM/DL
GLUCOSE SERPL-MCNC: 100 MG/DL (ref 65–99)
HCT VFR BLD AUTO: 32.6 % (ref 34–46.6)
HGB BLD-MCNC: 10.2 G/DL (ref 12–15.9)
IMM GRANULOCYTES # BLD AUTO: 0.01 10*3/MM3 (ref 0–0.05)
IMM GRANULOCYTES NFR BLD AUTO: 0.2 % (ref 0–0.5)
LYMPHOCYTES # BLD AUTO: 0.53 10*3/MM3 (ref 0.7–3.1)
LYMPHOCYTES NFR BLD AUTO: 10.3 % (ref 19.6–45.3)
MCH RBC QN AUTO: 29.3 PG (ref 26.6–33)
MCHC RBC AUTO-ENTMCNC: 31.3 G/DL (ref 31.5–35.7)
MCV RBC AUTO: 93.7 FL (ref 79–97)
MONOCYTES # BLD AUTO: 0.58 10*3/MM3 (ref 0.1–0.9)
MONOCYTES NFR BLD AUTO: 11.3 % (ref 5–12)
NEUTROPHILS NFR BLD AUTO: 3.63 10*3/MM3 (ref 1.7–7)
NEUTROPHILS NFR BLD AUTO: 70.8 % (ref 42.7–76)
NRBC BLD AUTO-RTO: 0 /100 WBC (ref 0–0.2)
PLATELET # BLD AUTO: 162 10*3/MM3 (ref 140–450)
PMV BLD AUTO: 10.3 FL (ref 6–12)
POTASSIUM SERPL-SCNC: 3.7 MMOL/L (ref 3.5–5.2)
PROT SERPL-MCNC: 5.2 G/DL (ref 6–8.5)
RBC # BLD AUTO: 3.48 10*6/MM3 (ref 3.77–5.28)
SARS-COV-2 RNA RESP QL NAA+PROBE: NOT DETECTED
SODIUM SERPL-SCNC: 141 MMOL/L (ref 136–145)
WBC NRBC COR # BLD: 5.13 10*3/MM3 (ref 3.4–10.8)

## 2023-04-01 PROCEDURE — 25010000002 CEFTRIAXONE PER 250 MG: Performed by: INTERNAL MEDICINE

## 2023-04-01 PROCEDURE — 85025 COMPLETE CBC W/AUTO DIFF WBC: CPT | Performed by: INTERNAL MEDICINE

## 2023-04-01 PROCEDURE — 93010 ELECTROCARDIOGRAM REPORT: CPT | Performed by: INTERNAL MEDICINE

## 2023-04-01 PROCEDURE — 80053 COMPREHEN METABOLIC PANEL: CPT | Performed by: INTERNAL MEDICINE

## 2023-04-01 PROCEDURE — 93005 ELECTROCARDIOGRAM TRACING: CPT | Performed by: INTERNAL MEDICINE

## 2023-04-01 PROCEDURE — 25010000002 ENOXAPARIN PER 10 MG: Performed by: INTERNAL MEDICINE

## 2023-04-01 PROCEDURE — 94799 UNLISTED PULMONARY SVC/PX: CPT

## 2023-04-01 RX ORDER — SODIUM CHLORIDE 0.9 % (FLUSH) 0.9 %
10 SYRINGE (ML) INJECTION AS NEEDED
Status: DISCONTINUED | OUTPATIENT
Start: 2023-04-01 | End: 2023-04-03 | Stop reason: HOSPADM

## 2023-04-01 RX ORDER — OMEGA-3S/DHA/EPA/FISH OIL/D3 300MG-1000
400 CAPSULE ORAL DAILY
Status: DISCONTINUED | OUTPATIENT
Start: 2023-04-01 | End: 2023-04-03 | Stop reason: HOSPADM

## 2023-04-01 RX ORDER — CEFTRIAXONE SODIUM 1 G/50ML
1 INJECTION, SOLUTION INTRAVENOUS EVERY 24 HOURS
Status: DISCONTINUED | OUTPATIENT
Start: 2023-04-01 | End: 2023-04-03 | Stop reason: HOSPADM

## 2023-04-01 RX ORDER — MULTIPLE VITAMINS W/ MINERALS TAB 9MG-400MCG
1 TAB ORAL DAILY
COMMUNITY

## 2023-04-01 RX ORDER — ENOXAPARIN SODIUM 100 MG/ML
30 INJECTION SUBCUTANEOUS DAILY
Status: DISCONTINUED | OUTPATIENT
Start: 2023-04-01 | End: 2023-04-03 | Stop reason: HOSPADM

## 2023-04-01 RX ORDER — SODIUM CHLORIDE 9 MG/ML
40 INJECTION, SOLUTION INTRAVENOUS AS NEEDED
Status: DISCONTINUED | OUTPATIENT
Start: 2023-04-01 | End: 2023-04-03 | Stop reason: HOSPADM

## 2023-04-01 RX ORDER — GABAPENTIN 300 MG/1
300 CAPSULE ORAL EVERY 8 HOURS SCHEDULED
Status: DISCONTINUED | OUTPATIENT
Start: 2023-04-01 | End: 2023-04-03 | Stop reason: HOSPADM

## 2023-04-01 RX ORDER — GABAPENTIN 300 MG/1
600 CAPSULE ORAL EVERY 8 HOURS SCHEDULED
Status: DISCONTINUED | OUTPATIENT
Start: 2023-04-01 | End: 2023-04-01

## 2023-04-01 RX ORDER — HYDROCODONE BITARTRATE AND ACETAMINOPHEN 5; 325 MG/1; MG/1
1 TABLET ORAL EVERY 8 HOURS PRN
Status: DISCONTINUED | OUTPATIENT
Start: 2023-04-01 | End: 2023-04-03 | Stop reason: HOSPADM

## 2023-04-01 RX ORDER — ESTRADIOL 0.1 MG/G
1 CREAM VAGINAL NIGHTLY
Status: DISCONTINUED | OUTPATIENT
Start: 2023-04-01 | End: 2023-04-03 | Stop reason: HOSPADM

## 2023-04-01 RX ORDER — DENOSUMAB 60 MG/ML
60 INJECTION SUBCUTANEOUS
COMMUNITY

## 2023-04-01 RX ORDER — SODIUM CHLORIDE 0.9 % (FLUSH) 0.9 %
10 SYRINGE (ML) INJECTION EVERY 12 HOURS SCHEDULED
Status: DISCONTINUED | OUTPATIENT
Start: 2023-04-01 | End: 2023-04-03 | Stop reason: HOSPADM

## 2023-04-01 RX ORDER — ACETAMINOPHEN 325 MG/1
650 TABLET ORAL EVERY 4 HOURS PRN
Status: DISCONTINUED | OUTPATIENT
Start: 2023-04-01 | End: 2023-04-03 | Stop reason: HOSPADM

## 2023-04-01 RX ORDER — LISINOPRIL 10 MG/1
10 TABLET ORAL DAILY
Status: DISCONTINUED | OUTPATIENT
Start: 2023-04-01 | End: 2023-04-01

## 2023-04-01 RX ORDER — CALCIUM CARBONATE/VITAMIN D3 600 MG-10
1 TABLET ORAL DAILY
Status: DISCONTINUED | OUTPATIENT
Start: 2023-04-01 | End: 2023-04-03 | Stop reason: HOSPADM

## 2023-04-01 RX ORDER — ONDANSETRON 4 MG/1
1 TABLET, FILM COATED ORAL 3 TIMES DAILY PRN
COMMUNITY
Start: 2023-03-30

## 2023-04-01 RX ADMIN — CEFTRIAXONE SODIUM 1 G: 1 INJECTION, SOLUTION INTRAVENOUS at 21:48

## 2023-04-01 RX ADMIN — GABAPENTIN 600 MG: 300 CAPSULE ORAL at 05:50

## 2023-04-01 RX ADMIN — HYDROCODONE BITARTRATE AND ACETAMINOPHEN 1 TABLET: 5; 325 TABLET ORAL at 19:42

## 2023-04-01 RX ADMIN — GABAPENTIN 300 MG: 300 CAPSULE ORAL at 21:48

## 2023-04-01 RX ADMIN — Medication 1 TABLET: at 09:06

## 2023-04-01 RX ADMIN — CHOLECALCIFEROL (VITAMIN D3) 10 MCG (400 UNIT) TABLET 400 UNITS: at 09:06

## 2023-04-01 RX ADMIN — GABAPENTIN 600 MG: 300 CAPSULE ORAL at 13:40

## 2023-04-01 RX ADMIN — Medication 10 ML: at 21:48

## 2023-04-01 RX ADMIN — ESTRADIOL 1 G: 0.1 CREAM VAGINAL at 21:48

## 2023-04-01 RX ADMIN — ENOXAPARIN SODIUM 30 MG: 100 INJECTION SUBCUTANEOUS at 09:06

## 2023-04-01 NOTE — PLAN OF CARE
Problem: Adult Inpatient Plan of Care  Goal: Plan of Care Review  Outcome: Ongoing, Progressing  Goal: Patient-Specific Goal (Individualized)  Outcome: Ongoing, Progressing  Goal: Absence of Hospital-Acquired Illness or Injury  Outcome: Ongoing, Progressing  Intervention: Identify and Manage Fall Risk  Goal: Optimal Comfort and Wellbeing  Outcome: Ongoing, Progressing  Goal: Readiness for Transition of Care  Outcome: Ongoing, Progressing   Goal Outcome Evaluation:  Plan of Care Reviewed With: patient        Progress: improving  Outcome Evaluation: Patient has done well this shift, is able to ambulate to bathroom with one assist. All questions and concerns addressed by staff. No other issues or complaints at this time. Will continue to monitor, call light in reach.

## 2023-04-01 NOTE — CONSULTS
"Nutrition Services    Patient Name: Devi Bobo  YOB: 1945  MRN: [id number]  Admission date: 3/31/2023      CLINICAL NUTRITION ASSESSMENT      Reason for Assessment  MST score 2+, BMI   H&P:    Past Medical History:   Diagnosis Date   • Age-related osteoporosis without current pathological fracture    • Allergy     SULFA DRUGS   MILD   • Atypical squamous cells cannot exclude high grade squamous intraepithelial lesion on cytologic smear of cervix (ASC-H)    • 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)    • Hypertension    • Lesion of sciatic nerve, bilateral lower limbs    • Osteoporosis    • Overactive bladder    • Piriformis syndrome    • Polyp of colon    • Postmenopausal atrophic vaginitis    • Vaginal atrophy         Current Problems:   Active Hospital Problems    Diagnosis    • **Sepsis, due to unspecified organism, unspecified whether acute organ dysfunction present         Nutrition/Diet History         Narrative     RD consult triggered by BMI <18.5 and MST score 3 for unsure wt loss and decreased appetite. Pt admitted w/ generalized weakness. Over the past 6 months, pt w/ 19.0% wt loss, clinically significant. Per nursing documentation, pt w/ 75% of breakfast today.     RD visited before lunch, family at bedside. Pt unsure of wt loss. Family reports UBW of 112-114 lbs, this is consistent w/ wt hx. Pt and family report that pt eats all day and has a really good appetite. Family member tells RD that 2 [place] get [place] of bed.     NFPE performed. Pt meets criteria for severe malnutrition based on weight and NFPE findings. Pt agreeable to both Magic Cup and Boost in vanilla. RD to order and continue to monitor per protocol.      Anthropometrics        Current Height, Weight Height: 170.2 cm (67\")  Weight: 42.7 kg (94 lb 2.2 oz)   Current BMI Body mass index is 14.74 kg/m².       Weight " Hx  Wt Readings from Last 30 Encounters:   04/01/23 0218 42.7 kg (94 lb 2.2 oz)   03/31/23 2058 49 kg (108 lb 0.4 oz)   10/13/22 0935 52.1 kg (114 lb 12.8 oz)   09/21/22 1410 52.9 kg (116 lb 10 oz)   01/19/22 1559 53.5 kg (118 lb)   01/06/22 0853 53.9 kg (118 lb 13.3 oz)   12/01/21 1400 54 kg (119 lb)   10/20/21 1109 52.5 kg (115 lb 12.8 oz)   10/05/21 1147 52.8 kg (116 lb 6.4 oz)   12/15/20 0000 55.1 kg (121 lb 8 oz)   07/03/18 0000 52.7 kg (116 lb 2 oz)            Wt Change Observation 19.0% decrease x 6 months     Estimated/Assessed Needs       Energy Requirements 30-35 kcals/kg IBW   EST Needs (kcal/day) 5755-7018 kcal       Protein Requirements 1.0-1.2 g/kg IBW   EST Daily Needs (g/day) 63-75 g       Fluid Requirements 1 ml/kcal    Estimated Needs (mL/day) 6707-6428 ml     Labs/Medications         Pertinent Labs Reviewed.   Results from last 7 days   Lab Units 04/01/23  0529 03/31/23  2112   SODIUM mmol/L 141 135*   POTASSIUM mmol/L 3.7 3.9   CHLORIDE mmol/L 106 99   CO2 mmol/L 27.4 24.0   BUN mg/dL 11 12   CREATININE mg/dL 1.09* 1.26*   CALCIUM mg/dL 8.3* 9.0   BILIRUBIN mg/dL 0.4 0.6   ALK PHOS U/L 87 119*   ALT (SGPT) U/L 13 18   AST (SGOT) U/L 30 47*   GLUCOSE mg/dL 100* 121*     Results from last 7 days   Lab Units 04/01/23  0529   HEMOGLOBIN g/dL 10.2*   HEMATOCRIT % 32.6*     COVID19   Date Value Ref Range Status   03/31/2023 Not Detected Not Detected - Ref. Range Final     No results found for: HGBA1C      Pertinent Medications Reviewed.     Current Nutrition Orders & Evaluation of Intake       Oral Nutrition     Current PO Diet Diet: Cardiac Diets; Healthy Heart (2-3 Na+); Texture: Regular Texture (IDDSI 7); Fluid Consistency: Thin (IDDSI 0)   Supplement No active supplement orders                  Malnutrition Severity Assessment      Patient meets criteria for : Severe Malnutrition  Malnutrition Type (last 8 hours)     Malnutrition Severity Assessment     Row Name 04/01/23 1304       Malnutrition  Severity Assessment    Malnutrition Type Acute Disease or Injury - Related Malnutrition    Row Name 04/01/23 1304       Unintentional Weight Loss     Unintentional Weight Loss Findings Severe    Unintentional Weight Loss  Weight loss greater than 10% in six months    Row Name 04/01/23 1304       Muscle Loss    Loss of Muscle Mass Findings Severe    [place] Region Severe - deep hollowing/scooping, lack of muscle to touch, facial bones well defined    Clavicle Bone Region Severe - protruding prominent bone    Acromion Bone Region Severe - squared shoulders, bones, and acromion process protrusion prominent    Scapular Bone Region Severe - prominent bones, depressions easily visible between ribs, scapula, spine, shoulders    Patellar Region Severe - prominent bone, square looking, very little muscle definition    Row Name 04/01/23 1304       Fat Loss    Subcutaneous Fat Loss Findings Severe    Orbital Region  Severe - pronounced hollowness/depression, dark circles, loose saggy skin    Upper Arm Region Moderate - some fat tissue, not ample    Row Name 04/01/23 1304       Criteria Met (Must meet criteria for severity in at least 2 of these categories: M Wasting, Fat Loss, Fluid, Secondary Signs, Wt. Status, Intake)    Patient meets criteria for  Severe Malnutrition                     Nutrition Diagnosis         Nutrition Dx Problem 1 Severe malnutrition related to decreased ability to consume sufficient energy as evidenced by unintended wt change., body composition changes. and BMI 14.74.     Nutrition Intervention         Boost Plus vanilla BID (+720 kcal, 28 g pro)  Magic Cup vanilla QD (+290 kcal, 9 g pro)     Medical Nutrition Therapy/Nutrition Education          Learner     Readiness Patient and Family  Acceptance     Method     Response Explanation  Verbalizes understanding     Monitor/Evaluation        Monitor PO intake, Supplement intake, Pertinent labs, Weight, POC/GOC     Nutrition Discharge Plan         To be  determined     Electronically signed by:  Lynne Matos RD  04/01/23 13:00 EDT

## 2023-04-01 NOTE — H&P
[facility]   HISTORY AND PHYSICAL    Patient Name: Devi Bobo  : 1945  MRN: [id number]  Primary Care Physician:  Raad Del Toro MD  Date of admission: 3/31/2023    Subjective   Subjective     Chief Complaint: Confusion, UTI treated outpatient    HPI:    Devi Bobo is a 78 y.o. female gives a history of UTI for which Dr. Chiu put her on nitrofurantoin.  She came to the ER and was admitted, started on IV Rocephin.  Son at bedside states that she has been very confused and weak at home.  She was walking and doing everything a week ago but last week she has been barely able to walk and was confused.  Today she is alert and talking.  Son worried whether she had a stroke at home or was allergic to the nitrofurantoin    Review of Systems   Review of Systems   Constitutional: Activity change: up in bed.   HENT: Negative.    Eyes: Negative.    Respiratory: Negative.    Cardiovascular: Negative.    Gastrointestinal: Negative.    Endocrine: Negative.    Genitourinary: Negative.    Musculoskeletal: Negative.    Skin: Negative.    Allergic/Immunologic: Negative.    Neurological: Negative.    Hematological: Negative.    Psychiatric/Behavioral: Negative.        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)    • Hypertension    • Lesion of sciatic nerve, bilateral lower limbs    • Osteoporosis    • Overactive bladder    • Piriformis syndrome    • Polyp of colon    • Postmenopausal atrophic vaginitis    • Vaginal atrophy        Past Surgical History:   Procedure Laterality Date   • COLONOSCOPY     • LEEP N/A 2022    Procedure: LOOP ELECTROCAUTERY EXCISION PROCEDURE;  Surgeon:  El Álvarez MD;  Location: [facility] MAIN OR;  Service: Gynecology;  Laterality: N/A;   • TUBAL ABDOMINAL LIGATION         Family History: family history includes Colon cancer in her sister; Heart disease in her father, mother, and sister; Thyroid disease in her mother and sister. Otherwise pertinent FHx was reviewed and not pertinent to current issue.    Social History:  reports that she has never smoked. She has never used smokeless tobacco. She reports that she does not currently use alcohol. She reports that she does not currently use drugs.    Home Medications:  Calcium Carb-Cholecalciferol, Calcium Carbonate-Vitamin D, Cholecalciferol, HYDROcodone-acetaminophen, alendronate, denosumab, estradiol, gabapentin, ibuprofen, lisinopril, multivitamin with minerals, nitrofurantoin (macrocrystal-monohydrate), and ondansetron      Allergies:  Allergies   Allergen Reactions   • Sulfa Antibiotics Anaphylaxis       Objective   Objective     Vitals:   Temp:  [98.1 °F (36.7 °C)-102.4 °F (39.1 °C)] 98.1 °F (36.7 °C)  Heart Rate:  [] 84  Resp:  [18-19] 18  BP: ()/(45-67) 108/56  Physical Exam    Constitutional: Awake, alert   Eyes: PERRLA, sclerae anicteric, no conjunctival injection   HENT: NCAT, mucous membranes moist   Neck: Supple, no thyromegaly, no lymphadenopathy, trachea midline   Respiratory: Clear to auscultation bilaterally, nonlabored respirations    Cardiovascular: RRR, no murmurs, rubs, or gallops, palpable pedal pulses bilaterally   Gastrointestinal: Positive bowel sounds, soft, nontender, nondistended   Musculoskeletal: No bilateral ankle edema, no clubbing or cyanosis to extremities   Psychiatric: Appropriate affect, cooperative   Neurologic: Oriented x 3, strength symmetric in all extremities, Cranial Nerves grossly intact to confrontation, speech clear   Skin: No rashes     Result Review    Result Review:  I have personally reviewed the results from the time of this admission to 4/1/2023  13:47 EDT and agree with these findings:  [x]  Laboratory  [x]  Microbiology  [x]  Radiology  []  EKG/Telemetry   []  Cardiology/Vascular   []  Pathology  []  Old records  []  Other:  Most notable findings include: Urinalysis shows few bacteria    Assessment & Plan   Assessment / Plan     Brief Patient Summary:  Devi Bobo is a 78 y.o. female who was really weak could barely walk at home was being treated for UTI he was also confused.  Today she is alert and talking well    Active Hospital Problems:  Active Hospital Problems    Diagnosis    • **Sepsis, due to unspecified organism, unspecified whether acute organ dysfunction present      Plan:   Urine C&S  IV Rocephin  Will do MRI brain with contrast to rule out TIA    DVT prophylaxis:  Medical DVT prophylaxis orders are present.    CODE STATUS:    Level Of Support Discussed With: Patient  Code Status (Patient has no pulse and is not breathing): CPR (Attempt to Resuscitate)  Medical Interventions (Patient has pulse or is breathing): Full Support    Admission Status:  I believe this patient meets in patient status.    Electronically signed by Jackson Harp MD, 04/01/23, 1:47 PM EDT.

## 2023-04-01 NOTE — ED PROVIDER NOTES
Time: 9:56 PM EDT  Date of encounter:  3/31/2023  Independent Historian/Clinical History and Information was obtained by:   Patient and Family  Chief Complaint: Weakness    History is limited by: Altered Mental Status    History of Present Illness:      Patient is a 78 y.o. year old female who presents to the emergency department for evaluation of weakness with acute onset 3 days ago. Pt states that she was recently prescribed with nitrofuran on 3/28/23 by Dr. Del Toro due to feeling weak and sick and notes that medication did not improve those symptoms. Pt reports contacting her doctor and was prescribed with Zofran to manage symptoms. Pt denies SOB, bladder, abdominal, and flank pain. Pt reports multiple episodes of emesis, and dysuria. Relative states that prior to taking her new medication pt was able to walk 2-3 miles at a time, but has barely been able to walk since starting her medication. Relative also notes that she has been falling out of her chair and not at baseline in general.       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)    • Hypertension    • Lesion of sciatic nerve, bilateral lower limbs    • Osteoporosis    • Overactive bladder    • Piriformis syndrome    • Polyp of colon    • Postmenopausal atrophic vaginitis    • Vaginal atrophy      Past Surgical History:   Procedure Laterality Date   • COLONOSCOPY  2019   • LEEP  N/A 1/6/2022    Procedure: LOOP ELECTROCAUTERY EXCISION PROCEDURE;  Surgeon: El Álvarez MD;  Location: [facility] MAIN OR;  Service: Gynecology;  Laterality: N/A;   • TUBAL ABDOMINAL LIGATION       Family History   Problem Relation Age of Onset   • Thyroid disease Mother    • Heart disease Mother    • Heart disease Father    • Thyroid disease Sister    • Heart disease Sister    • Colon cancer Sister        Home Medications:  Prior to Admission medications    Medication Sig Start Date End Date Taking? Authorizing Provider   alendronate (FOSAMAX) 70 MG tablet Take 70 mg by mouth Every 7 (Seven) Days. 9/10/21   Nai Clements MD   Calcium Carb-Cholecalciferol 600-400 MG-UNIT tablet Take 1 tablet by mouth Daily.    Nai Clements MD   Calcium Carbonate-Vitamin D 600-200 MG-UNIT capsule Take 600 capsules by mouth Daily.    Nai Clements MD   Cholecalciferol 10 MCG (400 UNIT) capsule Take 400 capsules by mouth Daily.    Nai Clements MD   Cholecalciferol 10 MCG (400 UNIT) capsule Take 1 capsule by mouth Daily.    Nai Clements MD   estradiol (ESTRACE) 0.1 MG/GM vaginal cream Insert 1 gram into the vagina 2-3 times a week 1/6/23   Sanjiv Kelly APRN   gabapentin (NEURONTIN) 600 MG tablet Take 600 mg by mouth 3 (Three) Times a Day. 9/4/21   Nai Clements MD   HYDROcodone-acetaminophen (NORCO) 5-325 MG per tablet Take 1 tablet by mouth Every 8 (Eight) Hours As Needed. 10/11/22   Nai Clements MD   ibuprofen (ADVIL,MOTRIN) 600 MG tablet Take 1 tablet by mouth Every 6 (Six) Hours As Needed for Mild Pain . 1/6/22   El Álvarez MD   lisinopril (PRINIVIL,ZESTRIL) 10 MG tablet Take 10 mg by mouth Daily. 9/20/21   Nai Clements MD        Social History:   Social History     Tobacco Use   • Smoking status: Never   • Smokeless tobacco: Never   • Tobacco comments:     current non smoker unknown if ever smoked   Vaping Use   • Vaping Use: Never used  "  Substance Use Topics   • Alcohol use: Not Currently   • Drug use: Not Currently         Review of Systems:  Review of Systems   Unable to perform ROS: Mental status change        Physical Exam:  BP 90/47 (BP Location: Left arm, Patient Position: Lying)   Pulse 71   Temp 98.1 °F (36.7 °C) (Oral)   Resp 18   Ht 170.2 cm (67\")   Wt 42.7 kg (94 lb 2.2 oz)   SpO2 95%   BMI 14.74 kg/m²     Physical Exam  Vitals and nursing note reviewed.   Constitutional:       General: She is not in acute distress.     Appearance: Normal appearance. She is not toxic-appearing.   HENT:      Head: Normocephalic and atraumatic.      Jaw: There is normal jaw occlusion.   Eyes:      General: Lids are normal.      Extraocular Movements: Extraocular movements intact.      Conjunctiva/sclera: Conjunctivae normal.      Pupils: Pupils are equal, round, and reactive to light.   Cardiovascular:      Rate and Rhythm: Normal rate and regular rhythm.      Pulses: Normal pulses.      Heart sounds: Normal heart sounds.   Pulmonary:      Effort: Pulmonary effort is normal. No respiratory distress.      Breath sounds: Normal breath sounds. No wheezing or rhonchi.   Abdominal:      General: Abdomen is flat.      Palpations: Abdomen is soft.      Tenderness: There is no abdominal tenderness. There is no guarding or rebound.   Musculoskeletal:         General: Normal range of motion.      Cervical back: Normal range of motion and neck supple.      Right lower leg: No edema.      Left lower leg: No edema.   Skin:     General: Skin is warm and dry.   Neurological:      Mental Status: She is alert and oriented to person, place, and time. Mental status is at baseline.   Psychiatric:         Mood and Affect: Mood normal.                  Procedures:  Procedures      Medical Decision Making:      Comorbidities that affect care:    Hypertension    External Notes reviewed:    None      The following orders were placed and all results were independently " analyzed by me:  Orders Placed This Encounter   Procedures   • Blood Culture - Blood,   • Blood Culture - Blood,   • COVID-19,APTIMA PANTHER(CHERRI),[facility]/ [facility], NP/OP SWAB IN UTM/VTM/SALINE TRANSPORT MEDIA,24 HR TAT - Swab, Nasopharynx   • Influenza Antigen, Rapid - Swab, Nasopharynx   • XR Chest 1 View   • Comprehensive Metabolic Panel   • Lactic Acid, Plasma   • Bohannon Draw   • Urinalysis With Culture If Indicated - Urine, Clean Catch   • CBC Auto Differential   • Urinalysis, Microscopic Only - Urine, Clean Catch   • CBC Auto Differential   • Comprehensive Metabolic Panel   • Diet: Cardiac Diets; Healthy Heart (2-3 Na+); Texture: Regular Texture (IDDSI 7); Fluid Consistency: Thin (IDDSI 0)   • Undress & Gown   • Continuous Pulse Oximetry   • Vital Signs   • Cardiac Monitoring   • Vital Signs   • Notify Provider (With Default Parameters)   • Intake & Output   • Weigh Patient   • Daily Weights   • Oral Care   • Saline Lock & Maintain IV Access   • Up in Chair   • Code Status and Medical Interventions:   • IP General Consult (Use specialty-specific consult if known)   • Oxygen Therapy- Nasal Cannula; Titrate for SPO2: 90% - 95%   • ECG 12 Lead   • Insert Peripheral IV   • Insert Peripheral IV   • Inpatient Admission   • CBC & Differential   • Green Top (Gel)   • Lavender Top   • Gold Top - SST   • Light Blue Top       Medications Given in the Emergency Department:  Medications   sodium chloride 0.9 % flush 10 mL (has no administration in time range)   sodium chloride 0.9 % flush 10 mL (has no administration in time range)   sodium chloride 0.9 % flush 10 mL (has no administration in time range)   sodium chloride 0.9 % infusion 40 mL (has no administration in time range)   acetaminophen (TYLENOL) tablet 650 mg (has no administration in time range)   Enoxaparin Sodium (LOVENOX) syringe 30 mg (has no administration in time range)   cefTRIAXone (ROCEPHIN) IVPB 1 g (has no administration in time range)   calcium  carb-cholecalciferol 600-10 MG-MCG per tablet 1 tablet (has no administration in time range)   cholecalciferol (VITAMIN D3) tablet 400 Units (has no administration in time range)   estradiol (ESTRACE) vaginal cream 1 g (1 g Vaginal Not Given 4/1/23 0227)   gabapentin (NEURONTIN) capsule 600 mg (600 mg Oral Given 4/1/23 0550)   HYDROcodone-acetaminophen (NORCO) 5-325 MG per tablet 1 tablet (has no administration in time range)   lisinopril (PRINIVIL,ZESTRIL) tablet 10 mg (has no administration in time range)   lactated ringers bolus 1,470 mL (0 mL Intravenous Stopped 4/1/23 0030)   cefTRIAXone (ROCEPHIN) IVPB 1 g (0 g Intravenous Stopped 3/31/23 2255)   acetaminophen (TYLENOL) tablet 975 mg (975 mg Oral Given 3/31/23 2232)        ED Course:    ED Course as of 04/01/23 0650   Fri Mar 31, 2023   2212 Sepsis criteria was met in the emergency department and the Sepsis protocol (including antibiotic administration) was initiated.      SIRS criteria considered:   1.  Temperature > 100.4 or <98.6    2.  Heart Rate > 90    3.  Respiratory Rate > 22    4.  WBC > 12K or <4K.             Severe Sepsis:     Respiratory: Mechanical Ventilation or Bipap  Hypotension: SBP > 90 or MAP < 65  Renal: Creatinine > 2  Metabolic: Lactic Acid > 2  Hematologic: Platelets < 100K or INR > 1.5  Hepatic: BILI  >  2  CNS: Sudden AMS     Septic Shock:     Severe Sepsis + Persistent hypotension or Lactic Acid > 4     Normal saline bolus, Antibiotics, and final disposition was based on these definitions.        Sepsis was recognized at 2212    Antibiotics were ordered.       30 cc/kg bolus was indicated.       Patient did not receive the recommended 30ml/kg fluid bolus for sepsis because it would be harmful or detrimental to the patient.    The patient was ordered 30cc/kg of fluids.    Total Critical Care time of 35 minutes. Total critical care time documented does not include time spent on separately billed procedures for services of nurses or  physician assistants. I personally saw and examined the patient. I have reviewed all diagnostic interpretations and treatment plans as written. I was present for the key portions of any procedures performed and the inclusive time noted in any critical care statement. Critical care time includes patient management by me, time spent at the patients bedside,  time to review lab and imaging results, discussing patient care, documentation in the medical record, and time spent with family or caregiver.   [JS]      ED Course User Index  [JS] Aroldo Mancera MD       Labs:    Lab Results (last 24 hours)     Procedure Component Value Units Date/Time    CBC & Differential [[id number]]  (Abnormal) Collected: 03/31/23 2112    Specimen: Blood from Arm, Left Updated: 03/31/23 2127    Narrative:      The following orders were created for panel order CBC & Differential.  Procedure                               Abnormality         Status                     ---------                               -----------         ------                     CBC Auto Differential[[id number]]        Abnormal            Final result                 Please view results for these tests on the individual orders.    Comprehensive Metabolic Panel [[id number]]  (Abnormal) Collected: 03/31/23 2112    Specimen: Blood from Arm, Left Updated: 03/31/23 2143     Glucose 121 mg/dL      BUN 12 mg/dL      Creatinine 1.26 mg/dL      Sodium 135 mmol/L      Potassium 3.9 mmol/L      Chloride 99 mmol/L      CO2 24.0 mmol/L      Calcium 9.0 mg/dL      Total Protein 6.9 g/dL      Albumin 4.3 g/dL      ALT (SGPT) 18 U/L      AST (SGOT) 47 U/L      Alkaline Phosphatase 119 U/L      Total Bilirubin 0.6 mg/dL      Globulin 2.6 gm/dL      A/G Ratio 1.7 g/dL      BUN/Creatinine Ratio 9.5     Anion Gap 12.0 mmol/L      eGFR 44.3 mL/min/1.73     Narrative:      GFR Normal >60  Chronic Kidney Disease <60  Kidney Failure <15    The GFR formula is only valid for adults with stable  renal function between ages 18 and 70.    Lactic Acid, Plasma [[id number]]  (Normal) Collected: 03/31/23 2112    Specimen: Blood from Arm, Left Updated: 03/31/23 2141     Lactate 1.5 mmol/L     Blood Culture - Blood, Arm, Left [[id number]] Collected: 03/31/23 2112    Specimen: Blood from Arm, Left Updated: 03/31/23 2124    CBC Auto Differential [[id number]]  (Abnormal) Collected: 03/31/23 2112    Specimen: Blood from Arm, Left Updated: 03/31/23 2127     WBC 7.99 10*3/mm3      RBC 4.01 10*6/mm3      Hemoglobin 11.9 g/dL      Hematocrit 35.8 %      MCV 89.3 fL      MCH 29.7 pg      MCHC 33.2 g/dL      RDW 13.4 %      RDW-SD 44.1 fl      MPV 10.3 fL      Platelets 169 10*3/mm3      Neutrophil % 86.4 %      Lymphocyte % 3.6 %      Monocyte % 7.1 %      Eosinophil % 2.0 %      Basophil % 0.6 %      Immature Grans % 0.3 %      Neutrophils, Absolute 6.90 10*3/mm3      Lymphocytes, Absolute 0.29 10*3/mm3      Monocytes, Absolute 0.57 10*3/mm3      Eosinophils, Absolute 0.16 10*3/mm3      Basophils, Absolute 0.05 10*3/mm3      Immature Grans, Absolute 0.02 10*3/mm3      nRBC 0.0 /100 WBC     Blood Culture - Blood, Arm, Left [[id number]] Collected: 03/31/23 2125    Specimen: Blood from Arm, Left Updated: 03/31/23 2136    Urinalysis With Culture If Indicated - Straight Cath [[id number]]  (Abnormal) Collected: 03/31/23 2218    Specimen: Urine from Straight Cath Updated: 03/31/23 2256     Color, UA Yellow     Appearance, UA Clear     pH, UA 7.0     Specific Gravity, UA 1.007     Glucose, UA Negative     Ketones, UA Negative     Bilirubin, UA Negative     Blood, UA Negative     Protein, UA Negative     Leuk Esterase, UA Trace     Nitrite, UA Negative     Urobilinogen, UA 0.2 E.U./dL    Narrative:      In absence of clinical symptoms, the presence of pyuria, bacteria, and/or nitrites on the urinalysis result does not correlate with infection.    Urinalysis, Microscopic Only - Straight Cath [[id number]]  (Abnormal) Collected: 03/31/23  2218    Specimen: Urine from Straight Cath Updated: 03/31/23 2256     RBC, UA 0-2 /HPF      WBC, UA 3-5 /HPF      Comment: Urine culture not indicated.        Bacteria, UA None Seen /HPF      Squamous Epithelial Cells, UA 0-2 /HPF      Hyaline Casts, UA None Seen /LPF      Methodology Automated Microscopy    COVID-19,APTIMA PANTHER(CHERRI),[facility], NP/OP SWAB IN UTM/VTM/SALINE TRANSPORT MEDIA,24 HR TAT - Swab, Nasopharynx [[id number]] Collected: 03/31/23 2222    Specimen: Swab from Nasopharynx Updated: 03/31/23 2237    Influenza Antigen, Rapid - Swab, Nasopharynx [[id number]]  (Normal) Collected: 03/31/23 2222    Specimen: Swab from Nasopharynx Updated: 03/31/23 2308     Influenza A Ag, EIA Negative     Influenza B Ag, EIA Negative    CBC Auto Differential [[id number]]  (Abnormal) Collected: 04/01/23 0529    Specimen: Blood Updated: 04/01/23 0559     WBC 5.13 10*3/mm3      RBC 3.48 10*6/mm3      Hemoglobin 10.2 g/dL      Hematocrit 32.6 %      MCV 93.7 fL      MCH 29.3 pg      MCHC 31.3 g/dL      RDW 13.6 %      RDW-SD 46.3 fl      MPV 10.3 fL      Platelets 162 10*3/mm3      Neutrophil % 70.8 %      Lymphocyte % 10.3 %      Monocyte % 11.3 %      Eosinophil % 6.2 %      Basophil % 1.2 %      Immature Grans % 0.2 %      Neutrophils, Absolute 3.63 10*3/mm3      Lymphocytes, Absolute 0.53 10*3/mm3      Monocytes, Absolute 0.58 10*3/mm3      Eosinophils, Absolute 0.32 10*3/mm3      Basophils, Absolute 0.06 10*3/mm3      Immature Grans, Absolute 0.01 10*3/mm3      nRBC 0.0 /100 WBC     Comprehensive Metabolic Panel [[id number]]  (Abnormal) Collected: 04/01/23 0529    Specimen: Blood Updated: 04/01/23 0610     Glucose 100 mg/dL      BUN 11 mg/dL      Creatinine 1.09 mg/dL      Sodium 141 mmol/L      Potassium 3.7 mmol/L      Chloride 106 mmol/L      CO2 27.4 mmol/L      Calcium 8.3 mg/dL      Total Protein 5.2 g/dL      Albumin 3.2 g/dL      ALT (SGPT) 13 U/L      AST (SGOT) 30 U/L      Alkaline Phosphatase 87 U/L       Total Bilirubin 0.4 mg/dL      Globulin 2.0 gm/dL      A/G Ratio 1.6 g/dL      BUN/Creatinine Ratio 10.1     Anion Gap 7.6 mmol/L      eGFR 52.8 mL/min/1.73     Narrative:      GFR Normal >60  Chronic Kidney Disease <60  Kidney Failure <15    The GFR formula is only valid for adults with stable renal function between ages 18 and 70.           Imaging:    XR Chest 1 View    Result Date: 3/31/2023  PROCEDURE: XR CHEST 1 VW  COMPARISON: [facility], , CHEST PA/AP & LAT 2V, 1/25/2015, 16:07.  INDICATIONS: fever, sepsis  FINDINGS:  LUNGS: Normal.  No significant pulmonary parenchymal abnormalities.  VASCULATURE: Normal.  Unremarkable pulmonary vasculature.  CARDIAC: Normal.  No cardiac silhouette abnormality or cardiomegaly.  MEDIASTINUM: Normal.  No visible mass or adenopathy.  PLEURA: Normal.  No effusion or pleural thickening.  BONES: Normal.  No fracture or visible bony lesion.  OTHER: Negative.        No acute cardiopulmonary process identified.       ANGELITO KEYS MD       Electronically Signed and Approved By: ANGELITO KEYS MD on 3/31/2023 at 22:37                 Differential Diagnosis and Discussion:    Weakness: Based on the patient's history, signs, and symptoms, the diffential diagnosis includes but is not limited to meningitis, stroke, sepsis, subarachnoid hemorrhage, intracranial bleeding, encephalitis, acute uti, dehydration, MS, myasthenia gravis, Guillan Burns, migraine variant, neuromuscular disorders vertigo, electrolyte imbalance, and metabolic disorders.    All labs were reviewed and interpreted by me.    [facility]     Critical Care Note: Total Critical Care time of 35 minutes. Total critical care time documented does not include time spent on separately billed procedures for services of nurses or physician assistants. I personally saw and examined the patient. I have reviewed all diagnostic interpretations and treatment plans as written. I was present for the key portions of any  procedures performed and the inclusive time noted in any critical care statement. Critical care time includes patient management by me, time spent at the patients bedside,  time to review lab and imaging results, discussing patient care, documentation in the medical record, and time spent with family or caregiver.    Patient Care Considerations:          Consultants/Shared Management Plan:    Dr. Young for admission    Social Determinants of Health:    Patient is independent, reliable, and has access to care.       Disposition and Care Coordination:    Admit:   Through independent evaluation of the patient's history, physical, and imperical data, the patient meets criteria for observation/admission to the hospital.        Final diagnoses:   Sepsis, due to unspecified organism, unspecified whether acute organ dysfunction present (HCC)   Acute UTI   Generalized weakness   Fever, unspecified fever cause   Impaired mobility and ADLs        ED Disposition     ED Disposition   Decision to Admit    Condition   --    Comment   Level of Care: Telemetry [5]   Diagnosis: Sepsis, due to unspecified organism, unspecified whether acute organ dysfunction present (HCC) [[id number]]   Admitting Physician: REYNA YOUNG [[id number]]   Attending Physician: REYNA YOUNG [[id number]]   Certification: I Certify That Inpatient Hospital Services Are Medically Necessary For Greater Than 2 Midnights               This medical record created using voice recognition software.        Documentation assistance provided by Cam Grace acting as scribe for Aroldo Mancera MD. Information recorded by the scribe was done at my direction and has been verified and validated by me.        Cam Grace  03/31/23 9026       Aroldo Mancera MD  04/01/23 6026

## 2023-04-01 NOTE — SIGNIFICANT NOTE
04/01/23 9769   Plan   Plan Met with patient, son Randall and grandson, Dayday, at bedside.  Reports live with Randall and 2 other grandsons.  Patient is able to provide own ADL's.  Randall provides transportation.  No financial needs.  Patient plans to return home with family and no needs at this tiem.

## 2023-04-02 ENCOUNTER — APPOINTMENT (OUTPATIENT)
Dept: MRI IMAGING | Facility: HOSPITAL | Age: 78
DRG: 871 | End: 2023-04-02
Payer: MEDICARE

## 2023-04-02 PROCEDURE — 25010000002 ENOXAPARIN PER 10 MG: Performed by: INTERNAL MEDICINE

## 2023-04-02 PROCEDURE — 0 GADOBENATE DIMEGLUMINE 529 MG/ML SOLUTION: Performed by: INTERNAL MEDICINE

## 2023-04-02 PROCEDURE — A9577 INJ MULTIHANCE: HCPCS | Performed by: INTERNAL MEDICINE

## 2023-04-02 PROCEDURE — 70553 MRI BRAIN STEM W/O & W/DYE: CPT

## 2023-04-02 PROCEDURE — 94799 UNLISTED PULMONARY SVC/PX: CPT

## 2023-04-02 PROCEDURE — 25010000002 CEFTRIAXONE PER 250 MG: Performed by: INTERNAL MEDICINE

## 2023-04-02 RX ADMIN — CEFTRIAXONE SODIUM 1 G: 1 INJECTION, SOLUTION INTRAVENOUS at 21:07

## 2023-04-02 RX ADMIN — Medication 10 ML: at 08:21

## 2023-04-02 RX ADMIN — GABAPENTIN 300 MG: 300 CAPSULE ORAL at 14:29

## 2023-04-02 RX ADMIN — GABAPENTIN 300 MG: 300 CAPSULE ORAL at 21:06

## 2023-04-02 RX ADMIN — HYDROCODONE BITARTRATE AND ACETAMINOPHEN 1 TABLET: 5; 325 TABLET ORAL at 21:06

## 2023-04-02 RX ADMIN — GADOBENATE DIMEGLUMINE 8 ML: 529 INJECTION, SOLUTION INTRAVENOUS at 07:39

## 2023-04-02 RX ADMIN — GABAPENTIN 300 MG: 300 CAPSULE ORAL at 06:30

## 2023-04-02 RX ADMIN — ENOXAPARIN SODIUM 30 MG: 100 INJECTION SUBCUTANEOUS at 08:21

## 2023-04-02 RX ADMIN — Medication 10 ML: at 21:07

## 2023-04-02 RX ADMIN — ESTRADIOL 1 G: 0.1 CREAM VAGINAL at 21:06

## 2023-04-02 RX ADMIN — HYDROCODONE BITARTRATE AND ACETAMINOPHEN 1 TABLET: 5; 325 TABLET ORAL at 12:04

## 2023-04-02 RX ADMIN — Medication 1 TABLET: at 08:21

## 2023-04-02 RX ADMIN — CHOLECALCIFEROL (VITAMIN D3) 10 MCG (400 UNIT) TABLET 400 UNITS: at 08:21

## 2023-04-02 NOTE — PLAN OF CARE
Problem: Adult Inpatient Plan of Care  Goal: Plan of Care Review  Outcome: Ongoing, Progressing  Goal: Patient-Specific Goal (Individualized)  Outcome: Ongoing, Progressing  Goal: Absence of Hospital-Acquired Illness or Injury  Outcome: Ongoing, Progressing  Intervention: Identify and Manage Fall Risk  Goal: Optimal Comfort and Wellbeing  Outcome: Ongoing, Progressing  Goal: Readiness for Transition of Care  Outcome: Ongoing, Progressing   Goal Outcome Evaluation:  Plan of Care Reviewed With: patient        Progress: improving  Outcome Evaluation: Patient has been ambulating to the bathroom with standby assist, states she feels like she is getting stronger. All questions and concerns addressed, no other issues at this time. Will continue to monitor, call light in reach.

## 2023-04-02 NOTE — PROGRESS NOTES
[facility]     Progress Note    Patient Name: Devi Bobo  : 1945  MRN: [id number]  Primary Care Physician:  Raad Del Toro MD  Date of admission: 3/31/2023    Subjective   Subjective     Chief Complaint: Weakness and confusion after taking nitrofurantoin for UTI    HPI:  Patient Reports she is feeling better.  Giving IV Rocephin for UTI    Review of Systems   Review of Systems   Constitutional: Activity change: in bed.   HENT: Negative.    Eyes: Negative.    Respiratory: Negative.    Cardiovascular: Negative.    Gastrointestinal: Negative.    Endocrine: Negative.    Genitourinary: Negative.    Musculoskeletal: Negative.    Skin: Negative.    Allergic/Immunologic: Negative.    Neurological: Negative.    Hematological: Negative.    Psychiatric/Behavioral: Negative.        Objective   Objective     Vitals:   Temp:  [97.9 °F (36.6 °C)-98.4 °F (36.9 °C)] 98.2 °F (36.8 °C)  Heart Rate:  [80-96] 90  Resp:  [16-18] 16  BP: (113-138)/(61-72) 113/61  Physical Exam    Constitutional: Awake, alert   Eyes: PERRLA, sclerae anicteric, no conjunctival injection   HENT: NCAT, mucous membranes moist   Neck: Supple, no thyromegaly, no lymphadenopathy, trachea midline   Respiratory: Clear to auscultation bilaterally, nonlabored respirations    Cardiovascular: RRR, no murmurs, rubs, or gallops, palpable pedal pulses bilaterally   Gastrointestinal: Positive bowel sounds, soft, nontender, nondistended   Musculoskeletal: No bilateral ankle edema, no clubbing or cyanosis to extremities   Psychiatric: Appropriate affect, cooperative   Neurologic: Oriented x 3, strength symmetric in all extremities, Cranial Nerves grossly intact to confrontation, speech clear   Skin: No rashes     Result Review    Result Review:  I have personally reviewed the results from the time of this admission to 2023 13:13 EDT and agree with these findings:  [x]  Laboratory  [x]  Microbiology  []  Radiology  []  EKG/Telemetry   []   Cardiology/Vascular   []  Pathology  []  Old records  []  Other:  Most notable findings include: Blood cultures negative  Assessment & Plan   Assessment / Plan     Brief Patient Summary:  Devi Bobo is a 78 y.o. female who was treated for UTI with nitrofurantoin and developed severe weakness and confusion    Active Hospital Problems:  Active Hospital Problems    Diagnosis    • **Sepsis, due to unspecified organism, unspecified whether acute organ dysfunction present    • Severe Malnutrition (HCC)      Plan:   Continue IV Rocephin  Increase activity  Per family she eats a lot but still lost some weight    DVT prophylaxis:  Medical DVT prophylaxis orders are present.    CODE STATUS:   Level Of Support Discussed With: Patient  Code Status (Patient has no pulse and is not breathing): CPR (Attempt to Resuscitate)  Medical Interventions (Patient has pulse or is breathing): Full Support      Electronically signed by Jackson Harp MD, 04/02/23, 1:13 PM EDT.

## 2023-04-03 VITALS
HEART RATE: 92 BPM | TEMPERATURE: 98.6 F | RESPIRATION RATE: 17 BRPM | BODY MASS INDEX: 16.64 KG/M2 | OXYGEN SATURATION: 98 % | HEIGHT: 67 IN | SYSTOLIC BLOOD PRESSURE: 137 MMHG | WEIGHT: 106.04 LBS | DIASTOLIC BLOOD PRESSURE: 73 MMHG

## 2023-04-03 PROBLEM — R41.82 ALTERED MENTAL STATUS: Status: RESOLVED | Noted: 2023-04-03 | Resolved: 2023-04-03

## 2023-04-03 PROBLEM — R41.82 ALTERED MENTAL STATUS: Status: ACTIVE | Noted: 2023-04-03

## 2023-04-03 PROCEDURE — 25010000002 ENOXAPARIN PER 10 MG: Performed by: INTERNAL MEDICINE

## 2023-04-03 PROCEDURE — 94799 UNLISTED PULMONARY SVC/PX: CPT

## 2023-04-03 RX ADMIN — GABAPENTIN 300 MG: 300 CAPSULE ORAL at 06:18

## 2023-04-03 RX ADMIN — CHOLECALCIFEROL (VITAMIN D3) 10 MCG (400 UNIT) TABLET 400 UNITS: at 09:12

## 2023-04-03 RX ADMIN — Medication 1 TABLET: at 09:12

## 2023-04-03 RX ADMIN — ENOXAPARIN SODIUM 30 MG: 100 INJECTION SUBCUTANEOUS at 09:12

## 2023-04-03 RX ADMIN — HYDROCODONE BITARTRATE AND ACETAMINOPHEN 1 TABLET: 5; 325 TABLET ORAL at 16:21

## 2023-04-03 RX ADMIN — Medication 10 ML: at 09:12

## 2023-04-03 RX ADMIN — GABAPENTIN 300 MG: 300 CAPSULE ORAL at 14:18

## 2023-04-03 NOTE — PLAN OF CARE
No acute event family remains at bedside    Problem: Adult Inpatient Plan of Care  Goal: Plan of Care Review  Outcome: Ongoing, Progressing  Flowsheets (Taken 4/3/2023 0538)  Progress: improving  Goal: Patient-Specific Goal (Individualized)  Outcome: Ongoing, Progressing  Goal: Absence of Hospital-Acquired Illness or Injury  Outcome: Ongoing, Progressing  Intervention: Identify and Manage Fall Risk  Recent Flowsheet Documentation  Taken 4/3/2023 0400 by Alayna Iglesias RN  Safety Promotion/Fall Prevention: safety round/check completed  Taken 4/3/2023 0200 by Alayna Iglesias RN  Safety Promotion/Fall Prevention: safety round/check completed  Taken 4/3/2023 0000 by Alayna Iglesias RN  Safety Promotion/Fall Prevention: safety round/check completed  Taken 4/2/2023 2200 by Alayna Iglesias RN  Safety Promotion/Fall Prevention: safety round/check completed  Taken 4/2/2023 2015 by Alayna Iglesias RN  Safety Promotion/Fall Prevention:   safety round/check completed   activity supervised  Taken 4/2/2023 2000 by Alayna Iglesias RN  Safety Promotion/Fall Prevention: safety round/check completed  Intervention: Prevent Infection  Recent Flowsheet Documentation  Taken 4/2/2023 2015 by Alayna Iglesias RN  Infection Prevention: single patient room provided  Goal: Optimal Comfort and Wellbeing  Outcome: Ongoing, Progressing  Intervention: Monitor Pain and Promote Comfort  Recent Flowsheet Documentation  Taken 4/2/2023 2015 by Alayna Iglesias RN  Pain Management Interventions: position adjusted  Intervention: Provide Person-Centered Care  Recent Flowsheet Documentation  Taken 4/2/2023 2015 by Alayna Iglesias RN  Trust Relationship/Rapport:   care explained   questions answered   thoughts/feelings acknowledged  Goal: Readiness for Transition of Care  Outcome: Ongoing, Progressing     Problem: Skin Injury Risk Increased  Goal: Skin Health and Integrity  Outcome: Ongoing, Progressing     Problem: Fall Injury Risk  Goal:  Absence of Fall and Fall-Related Injury  Outcome: Ongoing, Progressing  Intervention: Identify and Manage Contributors  Recent Flowsheet Documentation  Taken 4/2/2023 2015 by Alayna Iglesias RN  Medication Review/Management: medications reviewed  Taken 4/2/2023 2000 by Alayna Iglesias RN  Medication Review/Management: medications reviewed  Intervention: Promote Injury-Free Environment  Recent Flowsheet Documentation  Taken 4/3/2023 0400 by Alayna Iglesias RN  Safety Promotion/Fall Prevention: safety round/check completed  Taken 4/3/2023 0200 by Alayna Iglesias RN  Safety Promotion/Fall Prevention: safety round/check completed  Taken 4/3/2023 0000 by Alayna Iglesias RN  Safety Promotion/Fall Prevention: safety round/check completed  Taken 4/2/2023 2200 by Alayna Iglesias RN  Safety Promotion/Fall Prevention: safety round/check completed  Taken 4/2/2023 2015 by Alayna Iglesias RN  Safety Promotion/Fall Prevention:   safety round/check completed   activity supervised  Taken 4/2/2023 2000 by Alayna Iglesias RN  Safety Promotion/Fall Prevention: safety round/check completed   Goal Outcome Evaluation:           Progress: improving

## 2023-04-03 NOTE — DISCHARGE SUMMARY
[facility]         DISCHARGE SUMMARY    Patient Name: Devi Bobo  : 1945  MRN: [id number]    Date of Admission: 3/31/2023  Date of Discharge: March 3, 2023  Primary Care Physician: Raad Del Toro MD    Consults     Date and Time Order Name Status Description    3/31/2023 11:51 PM IP General Consult (Use specialty-specific consult if known)            Presenting Problem:   Acute UTI [N39.0]  Impaired mobility and ADLs [Z74.09, Z78.9]  Generalized weakness [R53.1]  Fever, unspecified fever cause [R50.9]  Sepsis, due to unspecified organism, unspecified whether acute organ dysfunction present [A41.9]    Active and Resolved Hospital Problems:  Active Hospital Problems    Diagnosis POA   • Severe Malnutrition (HCC) [E43] Yes      Resolved Hospital Problems    Diagnosis POA   • **Altered mental status [R41.82] Yes         Hospital Course     Hospital Course:  Devi Bobo is a 78 y.o. female admitted to hospital for generalized weakness and increasing confusion.  ER physician diagnosed patient with UTI.  There was a low-grade fever.  Also patient was diagnosed with sepsis.  Her labs were essentially normal.  There was no fever documented in ER.  Lactate was normal.  Extensive work-up including COVID testing CBCs all were normal.    Patient was treated with for possible UTI.  Patient improved with this measure.    On the day of discharge she was feeling much better and awake and alert oriented back to baseline.  Patient grandson was present at the time of discharge he reported that patient is doing excellent and back to normal and she was able to walk 2 miles prior to this admission and she appears to be ready for discharge.  Home health was offered and they refused as patient is back to baseline      DISCHARGE Follow Up Recommendations for labs and diagnostics:   Discharge to home  Follow-up as an outpatient      Day of Discharge     Vital Signs:  Temp:  [97.9 °F (36.6 °C)-99.6 °F  (37.6 °C)] 98.6 °F (37 °C)  Heart Rate:  [79-93] 92  Resp:  [16-18] 17  BP: (125-141)/(67-79) 137/73    Physical Exam:    Elderly female cachectic, not in acute distress  Awake alert and oriented  Heart regular  Lungs clear  Abdomen soft and scaphoid nontender  Neuro awake alert and oriented  Extremities no edema      Pertinent  and/or Most Recent Results     LAB RESULTS:      Lab 04/01/23  0529 03/31/23 2112   WBC 5.13 7.99   HEMOGLOBIN 10.2* 11.9*   HEMATOCRIT 32.6* 35.8   PLATELETS 162 169   NEUTROS ABS 3.63 6.90   IMMATURE GRANS (ABS) 0.01 0.02   LYMPHS ABS 0.53* 0.29*   MONOS ABS 0.58 0.57   EOS ABS 0.32 0.16   MCV 93.7 89.3   LACTATE  --  1.5         Lab 04/01/23  0529 03/31/23 2112   SODIUM 141 135*   POTASSIUM 3.7 3.9   CHLORIDE 106 99   CO2 27.4 24.0   ANION GAP 7.6 12.0   BUN 11 12   CREATININE 1.09* 1.26*   EGFR 52.8* 44.3*   GLUCOSE 100* 121*   CALCIUM 8.3* 9.0         Lab 04/01/23  0529 03/31/23 2112   TOTAL PROTEIN 5.2* 6.9   ALBUMIN 3.2* 4.3   GLOBULIN 2.0 2.6   ALT (SGPT) 13 18   AST (SGOT) 30 47*   BILIRUBIN 0.4 0.6   ALK PHOS 87 119*                     Brief Urine Lab Results  (Last result in the past 365 days)      Color   Clarity   Blood   Leuk Est   Nitrite   Protein   CREAT   Urine HCG        03/31/23 2218 Yellow   Clear   Negative   Trace   Negative   Negative               Microbiology Results (last 10 days)     Procedure Component Value - Date/Time    COVID-19,APTIMA PANTHER(CHERRI),[facility], NP/OP SWAB IN UTM/VTM/SALINE TRANSPORT MEDIA,24 HR TAT - Swab, Nasopharynx [[id number]]  (Normal) Collected: 03/31/23 2222    Lab Status: Final result Specimen: Swab from Nasopharynx Updated: 04/01/23 1123     COVID19 Not Detected    Narrative:      Fact sheet for providers: https://www.fda.gov/media/541440/download     Fact sheet for patients: https://www.fda.gov/media/563094/download    Test performed by RT PCR.    Influenza Antigen, Rapid - Swab, Nasopharynx [[id number]]  (Normal) Collected:  03/31/23 2222    Lab Status: Final result Specimen: Swab from Nasopharynx Updated: 03/31/23 2308     Influenza A Ag, EIA Negative     Influenza B Ag, EIA Negative    Blood Culture - Blood, Arm, Left [[id number]]  (Normal) Collected: 03/31/23 2125    Lab Status: Preliminary result Specimen: Blood from Arm, Left Updated: 04/02/23 2145     Blood Culture No growth at 2 days    Blood Culture - Blood, Arm, Left [[id number]]  (Normal) Collected: 03/31/23 2112    Lab Status: Preliminary result Specimen: Blood from Arm, Left Updated: 04/02/23 2130     Blood Culture No growth at 2 days          PROCEDURES:    [unfilled]    MRI Brain With & Without Contrast    Result Date: 4/2/2023  Impression:   1. No acute intracranial abnormality      Portillo Samson M.D.       Electronically Signed and Approved By: Portillo Samson M.D. on 4/02/2023 at 8:20             XR Chest 1 View    Result Date: 3/31/2023  Impression:  No acute cardiopulmonary process identified.       ANGELITO KEYS MD       Electronically Signed and Approved By: ANGELITO KEYS MD on 3/31/2023 at 22:37                           Labs Pending at Discharge:  Pending Labs     Order Current Status    Blood Culture - Blood, Arm, Left Preliminary result    Blood Culture - Blood, Arm, Left Preliminary result            Discharge Details        Discharge Medications      New Medications      Instructions Start Date   Calcium Carbonate-Vitamin D 600-200 MG-UNIT capsule   600 capsules, Oral, Daily         Continue These Medications      Instructions Start Date   estradiol 0.1 MG/GM vaginal cream  Commonly known as: ESTRACE   Insert 1 gram into the vagina 2-3 times a week      gabapentin 600 MG tablet  Commonly known as: NEURONTIN   600 mg, Oral, 3 Times Daily      HYDROcodone-acetaminophen 5-325 MG per tablet  Commonly known as: NORCO   1 tablet, Oral, Every 8 Hours PRN      ibuprofen 600 MG tablet  Commonly known as: ADVIL,MOTRIN   600 mg, Oral, Every 6 Hours PRN      multivitamin  with minerals tablet tablet   1 tablet, Oral, Daily      ondansetron 4 MG tablet  Commonly known as: ZOFRAN   1 tablet, Oral, 3 Times Daily PRN      Prolia 60 MG/ML solution prefilled syringe syringe  Generic drug: denosumab   60 mg, Subcutaneous, Every 6 Months, Last dose: 9/21/22         Stop These Medications    alendronate 70 MG tablet  Commonly known as: FOSAMAX     Calcium Carb-Cholecalciferol 600-400 MG-UNIT tablet     Cholecalciferol 10 MCG (400 UNIT) capsule     lisinopril 10 MG tablet  Commonly known as: PRINIVIL,ZESTRIL     nitrofurantoin (macrocrystal-monohydrate) 100 MG capsule  Commonly known as: MACROBID            Allergies   Allergen Reactions   • Sulfa Antibiotics Anaphylaxis   • Nitrofurantoin Confusion         Discharge Disposition:    Home or Self Care    Diet:  Regular      Discharge Activity:     Activity Instructions     Activity as Tolerated              Future Appointments   Date Time Provider Department Center   10/13/2023 10:15 AM Sanjiv Kelly APRN [facility]       Additional Instructions for the Follow-ups that You Need to Schedule     Discharge Follow-up with PCP   As directed       Currently Documented PCP:    Raad Del Toro MD    PCP Phone Number:    [phone]     Follow Up Details: In 2 to 3 days               Time spent on Discharge including face to face service: 26 minutes.            I have dictated this note utilizing Dragon Dictation.             Please note that portions of this note were completed with a voice recognition program.             Part of this note may be an electronic transcription/translation of spoken language to printed text         using the Dragon Dictation System.       Electronically signed by Lamont Prince MD, 04/03/23, 4:54 PM EDT.

## 2023-04-04 ENCOUNTER — READMISSION MANAGEMENT (OUTPATIENT)
Dept: CALL CENTER | Facility: HOSPITAL | Age: 78
End: 2023-04-04
Payer: MEDICARE

## 2023-04-04 NOTE — OUTREACH NOTE
Prep Survey    Flowsheet Row Responses   [facility] facility patient discharged from? [facility]   Is LACE score < 7 ? No   Eligibility Readm Mgmt   Discharge diagnosis **Sepsis, due to unspecified organism, unspecified whether acute organ dysfunction present   Does the patient have one of the following disease processes/diagnoses(primary or secondary)? Sepsis   Does the patient have Home health ordered? No   Is there a DME ordered? No   Prep survey completed? Yes          Sophie VAIL - Registered Nurse

## 2023-04-05 LAB
BACTERIA SPEC AEROBE CULT: NORMAL
BACTERIA SPEC AEROBE CULT: NORMAL

## 2023-04-06 LAB — QT INTERVAL: 396 MS

## 2023-04-10 ENCOUNTER — LAB (OUTPATIENT)
Dept: LAB | Facility: HOSPITAL | Age: 78
End: 2023-04-10
Payer: MEDICARE

## 2023-04-10 ENCOUNTER — TRANSCRIBE ORDERS (OUTPATIENT)
Dept: ADMINISTRATIVE | Facility: HOSPITAL | Age: 78
End: 2023-04-10
Payer: MEDICARE

## 2023-04-10 DIAGNOSIS — M81.0 OSTEOPOROSIS, POSTMENOPAUSAL: Primary | ICD-10-CM

## 2023-04-10 DIAGNOSIS — Z79.899 ENCOUNTER FOR LONG-TERM (CURRENT) USE OF OTHER MEDICATIONS: ICD-10-CM

## 2023-04-10 DIAGNOSIS — M81.0 OSTEOPOROSIS, POSTMENOPAUSAL: ICD-10-CM

## 2023-04-10 LAB
25(OH)D3 SERPL-MCNC: 59.8 NG/ML (ref 30–100)
CALCIUM SPEC-SCNC: 9.5 MG/DL (ref 8.6–10.5)
CREAT SERPL-MCNC: 1.01 MG/DL (ref 0.57–1)
EGFRCR SERPLBLD CKD-EPI 2021: 57.8 ML/MIN/1.73

## 2023-04-10 PROCEDURE — 82306 VITAMIN D 25 HYDROXY: CPT

## 2023-04-10 PROCEDURE — 82310 ASSAY OF CALCIUM: CPT

## 2023-04-10 PROCEDURE — 36415 COLL VENOUS BLD VENIPUNCTURE: CPT

## 2023-04-10 PROCEDURE — 82565 ASSAY OF CREATININE: CPT

## 2023-04-11 ENCOUNTER — READMISSION MANAGEMENT (OUTPATIENT)
Dept: CALL CENTER | Facility: HOSPITAL | Age: 78
End: 2023-04-11
Payer: MEDICARE

## 2023-04-11 NOTE — OUTREACH NOTE
Sepsis Week 2 Survey    Flowsheet Row Responses   [facility] patient discharged from? [facility]   Does the patient have one of the following disease processes/diagnoses(primary or secondary)? Sepsis   Week 2 attempt successful? Yes   Call start time 1324   Call end time 1329   Discharge diagnosis **Sepsis, due to unspecified organism, unspecified whether acute organ dysfunction present   Person spoke with today (if not patient) and relationship son   Meds reviewed with patient/caregiver? Yes   Is the patient taking all medications as directed (includes completed medication regime)? Yes   Does the patient have a primary care provider?  Yes   Does the patient have an appointment with their PCP within 7 days of discharge? Yes   Has the patient kept scheduled appointments due by today? Yes   Has home health visited the patient within 72 hours of discharge? N/A   Psychosocial issues? No   Did the patient receive a copy of their discharge instructions? Yes   Nursing interventions Reviewed instructions with patient   What is the patient's perception of their health status since discharge? Improving   Nursing interventions Nurse provided patient education   Is the patient/caregiver able to teach back TIME? T emperature - higher or lower than normal, I nfection - may have signs and symptoms of an infection, M ental Decline - confused, sleepy, difficult to arouse, E xtremely Ill - severe pain, discomfort, shortness of breath   Nursing interventions Nurse provided patient education   Is patient/caregiver able to teach back steps to recovery at home? Set small, achievable goals for return to baseline health, Rest and regain strength, Eat a balanced diet   Is the patient/caregiver able to teach back signs and symptoms of worsening condition: Fever, Rapid heart rate (>90), Shortness of breath/rapid respiratory rate, Hyperthermia, Altered mental status(confusion/coma), High blood glucose without diabetes   Is the  patient/caregiver able to teach back the hierarchy of who to call/visit for symptoms/problems? PCP, Specialist, Home health nurse, Urgent Care, ED, 911 Yes   Week 2 call completed? Yes   Wrap up additional comments Son reports Pt doing ok but does seem to feel weak still. Pt currently at a Dr ashwin HAYES - Registered Nurse

## 2023-04-19 ENCOUNTER — READMISSION MANAGEMENT (OUTPATIENT)
Dept: CALL CENTER | Facility: HOSPITAL | Age: 78
End: 2023-04-19
Payer: MEDICARE

## 2023-04-19 NOTE — OUTREACH NOTE
Sepsis Week 3 Survey    Flowsheet Row Responses   [facility] facility patient discharged from? [facility]   Does the patient have one of the following disease processes/diagnoses(primary or secondary)? Sepsis   Week 3 attempt successful? No   Unsuccessful attempts Attempt 1          Sybil APARICIO - Registered Nurse

## 2023-04-20 ENCOUNTER — HOSPITAL ENCOUNTER (EMERGENCY)
Facility: HOSPITAL | Age: 78
Discharge: HOME | End: 2023-04-20
Attending: EMERGENCY MEDICINE
Payer: MEDICARE

## 2023-04-20 ENCOUNTER — APPOINTMENT (OUTPATIENT)
Dept: GENERAL RADIOLOGY | Facility: HOSPITAL | Age: 78
End: 2023-04-20
Payer: MEDICARE

## 2023-04-20 VITALS
TEMPERATURE: 98.2 F | RESPIRATION RATE: 16 BRPM | SYSTOLIC BLOOD PRESSURE: 127 MMHG | BODY MASS INDEX: 17.78 KG/M2 | HEIGHT: 65 IN | DIASTOLIC BLOOD PRESSURE: 80 MMHG | HEART RATE: 98 BPM | OXYGEN SATURATION: 100 % | WEIGHT: 106.7 LBS

## 2023-04-20 DIAGNOSIS — R53.1 GENERALIZED WEAKNESS: Primary | ICD-10-CM

## 2023-04-20 DIAGNOSIS — R53.81 PHYSICAL DECONDITIONING: ICD-10-CM

## 2023-04-20 LAB
ALBUMIN SERPL-MCNC: 4.2 G/DL (ref 3.5–5.2)
ALBUMIN/GLOB SERPL: 1.6 G/DL
ALP SERPL-CCNC: 99 U/L (ref 39–117)
ALT SERPL W P-5'-P-CCNC: 8 U/L (ref 1–33)
ANION GAP SERPL CALCULATED.3IONS-SCNC: 9.3 MMOL/L (ref 5–15)
AST SERPL-CCNC: 18 U/L (ref 1–32)
BASOPHILS # BLD AUTO: 0.05 10*3/MM3 (ref 0–0.2)
BASOPHILS NFR BLD AUTO: 0.5 % (ref 0–1.5)
BILIRUB SERPL-MCNC: 0.3 MG/DL (ref 0–1.2)
BILIRUB UR QL STRIP: NEGATIVE
BUN SERPL-MCNC: 21 MG/DL (ref 8–23)
BUN/CREAT SERPL: 17.5 (ref 7–25)
CALCIUM SPEC-SCNC: 9.9 MG/DL (ref 8.6–10.5)
CHLORIDE SERPL-SCNC: 102 MMOL/L (ref 98–107)
CLARITY UR: CLEAR
CO2 SERPL-SCNC: 26.7 MMOL/L (ref 22–29)
COLOR UR: YELLOW
CREAT SERPL-MCNC: 1.2 MG/DL (ref 0.57–1)
DEPRECATED RDW RBC AUTO: 45.2 FL (ref 37–54)
EGFRCR SERPLBLD CKD-EPI 2021: 47 ML/MIN/1.73
EOSINOPHIL # BLD AUTO: 0.09 10*3/MM3 (ref 0–0.4)
EOSINOPHIL NFR BLD AUTO: 1 % (ref 0.3–6.2)
ERYTHROCYTE [DISTWIDTH] IN BLOOD BY AUTOMATED COUNT: 13.6 % (ref 12.3–15.4)
GLOBULIN UR ELPH-MCNC: 2.6 GM/DL
GLUCOSE SERPL-MCNC: 105 MG/DL (ref 65–99)
GLUCOSE UR STRIP-MCNC: NEGATIVE MG/DL
HCT VFR BLD AUTO: 35.6 % (ref 34–46.6)
HGB BLD-MCNC: 11.7 G/DL (ref 12–15.9)
HGB UR QL STRIP.AUTO: NEGATIVE
HOLD SPECIMEN: NORMAL
HOLD SPECIMEN: NORMAL
IMM GRANULOCYTES # BLD AUTO: 0.04 10*3/MM3 (ref 0–0.05)
IMM GRANULOCYTES NFR BLD AUTO: 0.4 % (ref 0–0.5)
KETONES UR QL STRIP: NEGATIVE
LEUKOCYTE ESTERASE UR QL STRIP.AUTO: NEGATIVE
LYMPHOCYTES # BLD AUTO: 0.93 10*3/MM3 (ref 0.7–3.1)
LYMPHOCYTES NFR BLD AUTO: 10.1 % (ref 19.6–45.3)
MAGNESIUM SERPL-MCNC: 2.1 MG/DL (ref 1.6–2.4)
MCH RBC QN AUTO: 30 PG (ref 26.6–33)
MCHC RBC AUTO-ENTMCNC: 32.9 G/DL (ref 31.5–35.7)
MCV RBC AUTO: 91.3 FL (ref 79–97)
MONOCYTES # BLD AUTO: 0.61 10*3/MM3 (ref 0.1–0.9)
MONOCYTES NFR BLD AUTO: 6.6 % (ref 5–12)
NEUTROPHILS NFR BLD AUTO: 7.52 10*3/MM3 (ref 1.7–7)
NEUTROPHILS NFR BLD AUTO: 81.4 % (ref 42.7–76)
NITRITE UR QL STRIP: NEGATIVE
NRBC BLD AUTO-RTO: 0 /100 WBC (ref 0–0.2)
PH UR STRIP.AUTO: 7.5 [PH] (ref 5–8)
PLATELET # BLD AUTO: 264 10*3/MM3 (ref 140–450)
PMV BLD AUTO: 10.1 FL (ref 6–12)
POTASSIUM SERPL-SCNC: 4.4 MMOL/L (ref 3.5–5.2)
PROT SERPL-MCNC: 6.8 G/DL (ref 6–8.5)
PROT UR QL STRIP: NEGATIVE
RBC # BLD AUTO: 3.9 10*6/MM3 (ref 3.77–5.28)
SODIUM SERPL-SCNC: 138 MMOL/L (ref 136–145)
SP GR UR STRIP: 1.01 (ref 1–1.03)
TROPONIN T SERPL HS-MCNC: 16 NG/L
UROBILINOGEN UR QL STRIP: NORMAL
WBC NRBC COR # BLD: 9.24 10*3/MM3 (ref 3.4–10.8)
WHOLE BLOOD HOLD COAG: NORMAL
WHOLE BLOOD HOLD SPECIMEN: NORMAL

## 2023-04-20 PROCEDURE — 85025 COMPLETE CBC W/AUTO DIFF WBC: CPT

## 2023-04-20 PROCEDURE — 81003 URINALYSIS AUTO W/O SCOPE: CPT

## 2023-04-20 PROCEDURE — 83735 ASSAY OF MAGNESIUM: CPT

## 2023-04-20 PROCEDURE — 93005 ELECTROCARDIOGRAM TRACING: CPT

## 2023-04-20 PROCEDURE — 36415 COLL VENOUS BLD VENIPUNCTURE: CPT

## 2023-04-20 PROCEDURE — 84484 ASSAY OF TROPONIN QUANT: CPT

## 2023-04-20 PROCEDURE — 99283 EMERGENCY DEPT VISIT LOW MDM: CPT

## 2023-04-20 PROCEDURE — 80053 COMPREHEN METABOLIC PANEL: CPT

## 2023-04-20 PROCEDURE — 93005 ELECTROCARDIOGRAM TRACING: CPT | Performed by: EMERGENCY MEDICINE

## 2023-04-20 PROCEDURE — 71045 X-RAY EXAM CHEST 1 VIEW: CPT

## 2023-04-20 RX ORDER — SODIUM CHLORIDE 0.9 % (FLUSH) 0.9 %
10 SYRINGE (ML) INJECTION AS NEEDED
Status: DISCONTINUED | OUTPATIENT
Start: 2023-04-20 | End: 2023-04-20 | Stop reason: HOSPADM

## 2023-04-20 RX ADMIN — SODIUM CHLORIDE 500 ML: 9 INJECTION, SOLUTION INTRAVENOUS at 14:06

## 2023-04-20 NOTE — ED PROVIDER NOTES
Time: 11:48 AM EDT  Date of encounter:  4/20/2023  Independent Historian/Clinical History and Information was obtained by:   Patient  Chief Complaint   Patient presents with   • Weakness - Generalized       History is limited by: N/A    History of Present Illness:  Patient is a 78 y.o. year old female who presents to the emergency department for evaluation of generalized weakness.  Patient reports that she was recently admitted to the hospital for UTI/sepsis.  She was discharged and had been doing fairly well.  In the last couple days she has been feeling progressively weaker.  Also complains of suprapubic pain.  Denies fever/chills, cough, chest pain or shortness of breath.  Has had nausea but no vomiting.    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)    • Hypertension    • Lesion of sciatic nerve, bilateral lower limbs    • Osteoporosis    • Overactive bladder    • Piriformis syndrome    • Polyp of colon    • Postmenopausal atrophic vaginitis    • Vaginal atrophy      Past Surgical History:   Procedure Laterality Date   • COLONOSCOPY  2019   • LEEP N/A 1/6/2022    Procedure: LOOP ELECTROCAUTERY EXCISION PROCEDURE;  Surgeon: El Álvarez MD;  Location: [facility] MAIN OR;  Service: Gynecology;  Laterality: N/A;   • TUBAL ABDOMINAL LIGATION       Family History   Problem Relation Age of Onset   • Thyroid disease Mother    • Heart disease Mother    • Heart disease Father    • Thyroid disease Sister    • Heart disease  Sister    • Colon cancer Sister        Home Medications:  Prior to Admission medications    Medication Sig Start Date End Date Taking? Authorizing Provider   Calcium Carbonate-Vitamin D 600-200 MG-UNIT capsule Take 600 capsules by mouth Daily.    Nai Clements MD   denosumab (Prolia) 60 MG/ML solution prefilled syringe syringe Inject 1 mL under the skin into the appropriate area as directed Every 6 (Six) Months. Last dose: 9/21/22    Nai Clements MD   estradiol (ESTRACE) 0.1 MG/GM vaginal cream Insert 1 gram into the vagina 2-3 times a week 1/6/23   Sanjiv Kelly APRN   gabapentin (NEURONTIN) 600 MG tablet Take 1 tablet by mouth 3 (Three) Times a Day. 9/4/21   Nai Clements MD   HYDROcodone-acetaminophen (NORCO) 5-325 MG per tablet Take 1 tablet by mouth Every 8 (Eight) Hours As Needed. 10/11/22   Nai Clements MD   ibuprofen (ADVIL,MOTRIN) 600 MG tablet Take 1 tablet by mouth Every 6 (Six) Hours As Needed for Mild Pain . 1/6/22   El Álvarez MD   multivitamin with minerals (CENTRUM SILVER ADULT 50+ PO) Take 1 tablet by mouth Daily.    Nai Clements MD   ondansetron (ZOFRAN) 4 MG tablet Take 1 tablet by mouth 3 (Three) Times a Day As Needed for Nausea or Vomiting. 3/30/23   Nia Clements MD        Social History:   Social History     Tobacco Use   • Smoking status: Never   • Smokeless tobacco: Never   • Tobacco comments:     current non smoker unknown if ever smoked   Vaping Use   • Vaping Use: Never used   Substance Use Topics   • Alcohol use: Not Currently   • Drug use: Not Currently         Review of Systems:  Review of Systems   Constitutional: Negative for chills and fever.   HENT: Negative for congestion, ear pain and sore throat.    Eyes: Negative for pain.   Respiratory: Negative for cough, chest tightness and shortness of breath.    Cardiovascular: Negative for chest pain.   Gastrointestinal: Positive for abdominal pain and nausea. Negative  "for diarrhea and vomiting.   Genitourinary: Negative for flank pain and hematuria.   Musculoskeletal: Negative for joint swelling.   Skin: Negative for pallor.   Neurological: Positive for weakness. Negative for seizures and headaches.   All other systems reviewed and are negative.       Physical Exam:  /80 (BP Location: Right arm, Patient Position: Sitting)   Pulse 98   Temp 98.2 °F (36.8 °C) (Oral)   Resp 16   Ht 165.1 cm (65\")   Wt 48.4 kg (106 lb 11.2 oz)   SpO2 100%   BMI 17.76 kg/m²     Physical Exam      General: Awake alert and in no acute distress     HEENT: Head normocephalic atraumatic, eyes PERRLA EOMI, nose normal, oropharynx normal.     Neck: Supple full range of motion, no meningismus, no lymphadenopathy     Heart: Regular rate and rhythm, no murmurs or rubs, 2+ radial pulses bilaterally     Lungs: Clear to auscultation bilaterally without wheezes or crackles, no respiratory distress     Abdomen: Soft, nontender, nondistended, no rebound or guarding     Back exam:  No L-spine tenderness.  No rash.     Skin: Warm, dry, no rash     Musculoskeletal: Normal range of motion, no lower extremity edema     Neurologic: Oriented x3, no motor deficits no sensory deficits     Psychiatric: Mood appears stable, no psychosis             Procedures:  Procedures      Medical Decision Making:      Comorbidities that affect care:    Hypertension    External Notes reviewed:          The following orders were placed and all results were independently analyzed by me:  Orders Placed This Encounter   Procedures   • [facility] 12.  Standard Walker Folding   • XR Chest 1 View   • [place] Draw   • Comprehensive Metabolic Panel   • Single High Sensitivity Troponin T   • Magnesium   • Urinalysis With Microscopic If Indicated (No Culture) - Urine, Clean Catch   • CBC Auto Differential   • Undress & Gown   • Continuous Pulse Oximetry   • Vital Signs   • Ambulate patient   • ECG 12 Lead ED Triage Standing " Order; Weak / Dizzy / AMS   • CBC & Differential   • Green Top (Gel)   • Lavender Top   • Gold Top - SST   • Light Blue Top       Medications Given in the Emergency Department:  Medications   sodium chloride 0.9 % bolus 500 mL (0 mL Intravenous Stopped 4/20/23 1532)        ED Course:    The patient was initially evaluated in the triage area where orders were placed. The patient was later dispositioned by DEMETRA Suresh.      The patient was advised to stay for completion of workup which includes but is not limited to communication of labs and radiological results, reassessment and plan. The patient was advised that leaving prior to disposition by a provider could result in critical findings that are not communicated to the patient.          Labs:    Lab Results (last 24 hours)     ** No results found for the last 24 hours. **           Imaging:    No Radiology Exams Resulted Within Past 24 Hours      Differential Diagnosis and Discussion:      Weakness: Based on the patient's history, signs, and symptoms, the diffential diagnosis includes but is not limited to meningitis, stroke, sepsis, subarachnoid hemorrhage, intracranial bleeding, encephalitis, acute uti, dehydration, MS, myasthenia gravis, Guillan [place], migraine variant, neuromuscular disorders vertigo, electrolyte imbalance, and metabolic disorders.        MDM  Number of Diagnoses or Management Options  Generalized weakness  Physical deconditioning  Diagnosis management comments: CBC, CMP, UA, EKG and chest xray showed no acutely concerning findings. Patient received 500 ml fluid bolus and states that she felt batter after that. She was able to ambulate subsequently without much difficulty. Generalized weakness may be secondary to deconditioning after recent hospitalization. Discussed importance of participation in ADLs to maintain/ improve general activity tolerance with patient and family and they voiced understanding. Vital signs have been stable and  patient is subtibiale for discharge home with follow up with PCP. Patient is agreeable with plan for discharge. The patient was given a walker in the ED which she declined on DC.       Amount and/or Complexity of Data Reviewed  Clinical lab tests: reviewed and ordered  Tests in the radiology section of CPT®: reviewed and ordered  Tests in the medicine section of CPT®: reviewed and ordered  Decide to obtain previous medical records or to obtain history from someone other than the patient: yes    Risk of Complications, Morbidity, and/or Mortality  Presenting problems: low  Diagnostic procedures: low  Management options: low    Patient Progress  Patient progress: stable           Patient Care Considerations:          Consultants/Shared Management Plan:        Social Determinants of Health:    Patient is independent, reliable, and has access to care.       Disposition and Care Coordination:    Discharged: The patient is suitable and stable for discharge with no need for consideration of observation or admission.        Final diagnoses:   Generalized weakness   Physical deconditioning        ED Disposition     ED Disposition   Discharge    Condition   Stable    Comment   --             This medical record created using voice recognition software.           Kayla Reyes, DEMETRA  05/01/23 5165

## 2023-04-20 NOTE — DISCHARGE INSTRUCTIONS
Your lab work today did not show any concerning findings.  You are likely feeling weaker due to your recent hospitalization and generalized deconditioning.  It is important that you remain physically active to maintain and improve your physical activity tolerance.    Return for worsening symptoms or any new concerns.

## 2023-04-20 NOTE — SIGNIFICANT NOTE
04/20/23 1434   Plan   Final Discharge Disposition Code 30 - still a patient   Final Note SW notified by provider that pt was in need of a walker. SW provided pt a walker and [facility].

## 2023-04-25 ENCOUNTER — READMISSION MANAGEMENT (OUTPATIENT)
Dept: CALL CENTER | Facility: HOSPITAL | Age: 78
End: 2023-04-25
Payer: MEDICARE

## 2023-04-25 NOTE — OUTREACH NOTE
Sepsis Week 3 Survey    Flowsheet Row Responses   [facility] patient discharged from? [facility]   Does the patient have one of the following disease processes/diagnoses(primary or secondary)? Sepsis   Week 3 attempt successful? No   Unsuccessful attempts Attempt 2   Revoke Decline to participate          Roseline GTZ - Registered Nurse

## 2023-04-26 LAB — QT INTERVAL: 343 MS

## 2023-05-08 ENCOUNTER — HOSPITAL ENCOUNTER (OUTPATIENT)
Dept: INFUSION THERAPY | Facility: HOSPITAL | Age: 78
Discharge: HOME | End: 2023-05-08
Admitting: INTERNAL MEDICINE
Payer: MEDICARE

## 2023-05-08 VITALS
RESPIRATION RATE: 16 BRPM | TEMPERATURE: 98.5 F | SYSTOLIC BLOOD PRESSURE: 145 MMHG | DIASTOLIC BLOOD PRESSURE: 74 MMHG | WEIGHT: 107.58 LBS | HEIGHT: 64 IN | BODY MASS INDEX: 18.37 KG/M2 | HEART RATE: 87 BPM | OXYGEN SATURATION: 100 %

## 2023-05-08 DIAGNOSIS — M81.0 POSTMENOPAUSAL OSTEOPOROSIS: Primary | ICD-10-CM

## 2023-05-08 PROCEDURE — 96372 THER/PROPH/DIAG INJ SC/IM: CPT

## 2023-05-08 PROCEDURE — 25010000002 DENOSUMAB 60 MG/ML SOLUTION PREFILLED SYRINGE: Performed by: INTERNAL MEDICINE

## 2023-05-08 RX ADMIN — DENOSUMAB 60 MG: 60 INJECTION SUBCUTANEOUS at 13:22

## 2023-05-20 ENCOUNTER — APPOINTMENT (OUTPATIENT)
Dept: CT IMAGING | Facility: HOSPITAL | Age: 78
DRG: 177 | End: 2023-05-20
Payer: MEDICARE

## 2023-05-20 ENCOUNTER — HOSPITAL ENCOUNTER (INPATIENT)
Facility: HOSPITAL | Age: 78
LOS: 5 days | Discharge: HOME HEALTH | DRG: 177 | End: 2023-05-26
Attending: EMERGENCY MEDICINE | Admitting: INTERNAL MEDICINE
Payer: MEDICARE

## 2023-05-20 ENCOUNTER — APPOINTMENT (OUTPATIENT)
Dept: GENERAL RADIOLOGY | Facility: HOSPITAL | Age: 78
DRG: 177 | End: 2023-05-20
Payer: MEDICARE

## 2023-05-20 DIAGNOSIS — R53.1 WEAKNESS: Primary | ICD-10-CM

## 2023-05-20 DIAGNOSIS — R26.2 DIFFICULTY IN WALKING: ICD-10-CM

## 2023-05-20 DIAGNOSIS — Z78.9 DECREASED ACTIVITIES OF DAILY LIVING (ADL): ICD-10-CM

## 2023-05-20 LAB
ALBUMIN SERPL-MCNC: 4.3 G/DL (ref 3.5–5.2)
ALBUMIN/GLOB SERPL: 2 G/DL
ALP SERPL-CCNC: 78 U/L (ref 39–117)
ALT SERPL W P-5'-P-CCNC: 12 U/L (ref 1–33)
ANION GAP SERPL CALCULATED.3IONS-SCNC: 11.7 MMOL/L (ref 5–15)
AST SERPL-CCNC: 23 U/L (ref 1–32)
BASOPHILS # BLD AUTO: 0.02 10*3/MM3 (ref 0–0.2)
BASOPHILS NFR BLD AUTO: 0.2 % (ref 0–1.5)
BILIRUB SERPL-MCNC: 0.6 MG/DL (ref 0–1.2)
BILIRUB UR QL STRIP: NEGATIVE
BUN SERPL-MCNC: 17 MG/DL (ref 8–23)
BUN/CREAT SERPL: 13.2 (ref 7–25)
CALCIUM SPEC-SCNC: 9.1 MG/DL (ref 8.6–10.5)
CHLORIDE SERPL-SCNC: 101 MMOL/L (ref 98–107)
CLARITY UR: CLEAR
CO2 SERPL-SCNC: 26.3 MMOL/L (ref 22–29)
COLOR UR: YELLOW
CREAT SERPL-MCNC: 1.29 MG/DL (ref 0.57–1)
D-LACTATE SERPL-SCNC: 1.4 MMOL/L (ref 0.5–2)
DEPRECATED RDW RBC AUTO: 45.6 FL (ref 37–54)
EGFRCR SERPLBLD CKD-EPI 2021: 43.1 ML/MIN/1.73
EOSINOPHIL # BLD AUTO: 0.04 10*3/MM3 (ref 0–0.4)
EOSINOPHIL NFR BLD AUTO: 0.5 % (ref 0.3–6.2)
ERYTHROCYTE [DISTWIDTH] IN BLOOD BY AUTOMATED COUNT: 13.7 % (ref 12.3–15.4)
FLUAV AG NPH QL: NEGATIVE
FLUBV AG NPH QL IA: NEGATIVE
GLOBULIN UR ELPH-MCNC: 2.2 GM/DL
GLUCOSE SERPL-MCNC: 110 MG/DL (ref 65–99)
GLUCOSE UR STRIP-MCNC: NEGATIVE MG/DL
HCT VFR BLD AUTO: 34.8 % (ref 34–46.6)
HGB BLD-MCNC: 11.3 G/DL (ref 12–15.9)
HGB UR QL STRIP.AUTO: NEGATIVE
HOLD SPECIMEN: NORMAL
HOLD SPECIMEN: NORMAL
IMM GRANULOCYTES # BLD AUTO: 0.02 10*3/MM3 (ref 0–0.05)
IMM GRANULOCYTES NFR BLD AUTO: 0.2 % (ref 0–0.5)
KETONES UR QL STRIP: NEGATIVE
LEUKOCYTE ESTERASE UR QL STRIP.AUTO: NEGATIVE
LYMPHOCYTES # BLD AUTO: 0.38 10*3/MM3 (ref 0.7–3.1)
LYMPHOCYTES NFR BLD AUTO: 4.4 % (ref 19.6–45.3)
MAGNESIUM SERPL-MCNC: 1.9 MG/DL (ref 1.6–2.4)
MCH RBC QN AUTO: 29.5 PG (ref 26.6–33)
MCHC RBC AUTO-ENTMCNC: 32.5 G/DL (ref 31.5–35.7)
MCV RBC AUTO: 90.9 FL (ref 79–97)
MONOCYTES # BLD AUTO: 0.6 10*3/MM3 (ref 0.1–0.9)
MONOCYTES NFR BLD AUTO: 6.9 % (ref 5–12)
NEUTROPHILS NFR BLD AUTO: 7.65 10*3/MM3 (ref 1.7–7)
NEUTROPHILS NFR BLD AUTO: 87.8 % (ref 42.7–76)
NITRITE UR QL STRIP: NEGATIVE
NRBC BLD AUTO-RTO: 0 /100 WBC (ref 0–0.2)
PH UR STRIP.AUTO: 8 [PH] (ref 5–8)
PLATELET # BLD AUTO: 184 10*3/MM3 (ref 140–450)
PMV BLD AUTO: 9.8 FL (ref 6–12)
POTASSIUM SERPL-SCNC: 4.1 MMOL/L (ref 3.5–5.2)
PROT SERPL-MCNC: 6.5 G/DL (ref 6–8.5)
PROT UR QL STRIP: NEGATIVE
QT INTERVAL: 362 MS
RBC # BLD AUTO: 3.83 10*6/MM3 (ref 3.77–5.28)
SARS-COV-2 RNA RESP QL NAA+PROBE: DETECTED
SODIUM SERPL-SCNC: 139 MMOL/L (ref 136–145)
SP GR UR STRIP: 1.01 (ref 1–1.03)
TROPONIN T SERPL HS-MCNC: 36 NG/L
UROBILINOGEN UR QL STRIP: NORMAL
WBC NRBC COR # BLD: 8.71 10*3/MM3 (ref 3.4–10.8)
WHOLE BLOOD HOLD COAG: NORMAL
WHOLE BLOOD HOLD SPECIMEN: NORMAL

## 2023-05-20 PROCEDURE — 36415 COLL VENOUS BLD VENIPUNCTURE: CPT

## 2023-05-20 PROCEDURE — 93010 ELECTROCARDIOGRAM REPORT: CPT | Performed by: INTERNAL MEDICINE

## 2023-05-20 PROCEDURE — 93005 ELECTROCARDIOGRAM TRACING: CPT

## 2023-05-20 PROCEDURE — 71045 X-RAY EXAM CHEST 1 VIEW: CPT

## 2023-05-20 PROCEDURE — XW033E5 INTRODUCTION OF REMDESIVIR ANTI-INFECTIVE INTO PERIPHERAL VEIN, PERCUTANEOUS APPROACH, NEW TECHNOLOGY GROUP 5: ICD-10-PCS | Performed by: INTERNAL MEDICINE

## 2023-05-20 PROCEDURE — 84145 PROCALCITONIN (PCT): CPT | Performed by: INTERNAL MEDICINE

## 2023-05-20 PROCEDURE — 86140 C-REACTIVE PROTEIN: CPT | Performed by: INTERNAL MEDICINE

## 2023-05-20 PROCEDURE — 70450 CT HEAD/BRAIN W/O DYE: CPT

## 2023-05-20 PROCEDURE — G0378 HOSPITAL OBSERVATION PER HR: HCPCS

## 2023-05-20 PROCEDURE — 85025 COMPLETE CBC W/AUTO DIFF WBC: CPT

## 2023-05-20 PROCEDURE — 93005 ELECTROCARDIOGRAM TRACING: CPT | Performed by: EMERGENCY MEDICINE

## 2023-05-20 PROCEDURE — 87635 SARS-COV-2 COVID-19 AMP PRB: CPT | Performed by: EMERGENCY MEDICINE

## 2023-05-20 PROCEDURE — 83735 ASSAY OF MAGNESIUM: CPT | Performed by: INTERNAL MEDICINE

## 2023-05-20 PROCEDURE — 83605 ASSAY OF LACTIC ACID: CPT | Performed by: EMERGENCY MEDICINE

## 2023-05-20 PROCEDURE — 84484 ASSAY OF TROPONIN QUANT: CPT

## 2023-05-20 PROCEDURE — 25010000002 CEFTRIAXONE PER 250 MG: Performed by: EMERGENCY MEDICINE

## 2023-05-20 PROCEDURE — 99285 EMERGENCY DEPT VISIT HI MDM: CPT

## 2023-05-20 PROCEDURE — 80053 COMPREHEN METABOLIC PANEL: CPT

## 2023-05-20 PROCEDURE — 87040 BLOOD CULTURE FOR BACTERIA: CPT | Performed by: EMERGENCY MEDICINE

## 2023-05-20 PROCEDURE — 81003 URINALYSIS AUTO W/O SCOPE: CPT | Performed by: EMERGENCY MEDICINE

## 2023-05-20 PROCEDURE — 83735 ASSAY OF MAGNESIUM: CPT

## 2023-05-20 PROCEDURE — 87804 INFLUENZA ASSAY W/OPTIC: CPT | Performed by: EMERGENCY MEDICINE

## 2023-05-20 RX ORDER — SODIUM CHLORIDE 0.9 % (FLUSH) 0.9 %
10 SYRINGE (ML) INJECTION AS NEEDED
Status: DISCONTINUED | OUTPATIENT
Start: 2023-05-20 | End: 2023-05-26 | Stop reason: HOSPADM

## 2023-05-20 RX ORDER — ACETAMINOPHEN 325 MG/1
650 TABLET ORAL EVERY 4 HOURS PRN
Status: DISCONTINUED | OUTPATIENT
Start: 2023-05-20 | End: 2023-05-26 | Stop reason: HOSPADM

## 2023-05-20 RX ORDER — CEFTRIAXONE SODIUM 1 G/50ML
1 INJECTION, SOLUTION INTRAVENOUS ONCE
Status: COMPLETED | OUTPATIENT
Start: 2023-05-20 | End: 2023-05-20

## 2023-05-20 RX ORDER — LISINOPRIL 10 MG/1
10 TABLET ORAL DAILY
COMMUNITY

## 2023-05-20 RX ADMIN — SODIUM CHLORIDE 1000 ML: 9 INJECTION, SOLUTION INTRAVENOUS at 14:09

## 2023-05-20 RX ADMIN — CEFTRIAXONE SODIUM 1 G: 1 INJECTION, SOLUTION INTRAVENOUS at 09:18

## 2023-05-20 NOTE — ED PROVIDER NOTES
Time: 8:33 AM EDT  Date of encounter:  5/20/2023  Independent Historian/Clinical History and Information was obtained by:   Patient  Chief Complaint: fall and weakness    History is limited by: N/A    History of Present Illness:  Patient is a 78 y.o. year old female who presents to the emergency department for evaluation of headache after a fall last night in the bathroom.  Patient does report head injury.  Patient also reports weakness.  Patient has no subjective neurological deficit including numbness, tingling, or motor weakness.  Patient has no chest pain or shortness of breath.  Patient has no vomiting or diarrhea.  Patient does report dysuria with no urinary frequency.  Patient has no cough or hemoptysis.  Patient states that she fell due to weakness but denies lateralizing symptoms.  Family at the bedside states that she is lost a significant amount of weight in the last 2 months.    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)    • Hypertension    • Lesion of sciatic nerve, bilateral lower limbs    • Osteoporosis    • Overactive bladder    • Piriformis syndrome    • Polyp of colon    • Postmenopausal atrophic vaginitis    • Vaginal atrophy      Past Surgical History:   Procedure Laterality Date   • COLONOSCOPY  2019   • LEEP N/A 1/6/2022    Procedure: LOOP ELECTROCAUTERY EXCISION PROCEDURE;  Surgeon: El Álvarez MD;  Location: [facility] MAIN OR;  Service: Gynecology;  Laterality: N/A;   • TUBAL ABDOMINAL  LIGATION       Family History   Problem Relation Age of Onset   • Thyroid disease Mother    • Heart disease Mother    • Heart disease Father    • Thyroid disease Sister    • Heart disease Sister    • Colon cancer Sister        Home Medications:  Prior to Admission medications    Medication Sig Start Date End Date Taking? Authorizing Provider   Calcium Carbonate-Vitamin D 600-200 MG-UNIT capsule Take 600 capsules by mouth Daily.   Yes Nai Clements MD   CRANBERRY PO Take  by mouth.   Yes Nai Clements MD   denosumab (Prolia) 60 MG/ML solution prefilled syringe syringe Inject 1 mL under the skin into the appropriate area as directed Every 6 (Six) Months. Last dose: 9/21/22   Yes Nai Clements MD   gabapentin (NEURONTIN) 600 MG tablet Take 1 tablet by mouth 3 (Three) Times a Day. 9/4/21  Yes Nai Clements MD   HYDROcodone-acetaminophen (NORCO) 5-325 MG per tablet Take 1 tablet by mouth Every 8 (Eight) Hours As Needed. 10/11/22  Yes Nai Clements MD   multivitamin with minerals tablet tablet Take 1 tablet by mouth Daily.   Yes Nai Clements MD   ondansetron (ZOFRAN) 4 MG tablet Take 1 tablet by mouth 3 (Three) Times a Day As Needed for Nausea or Vomiting. 3/30/23  Yes Nai Clements MD   estradiol (ESTRACE) 0.1 MG/GM vaginal cream Insert 1 gram into the vagina 2-3 times a week 1/6/23   Sanjiv Kelly APRN   ibuprofen (ADVIL,MOTRIN) 600 MG tablet Take 1 tablet by mouth Every 6 (Six) Hours As Needed for Mild Pain . 1/6/22   El Álvarez MD   Nutritional Supplements (BOOST HIGH PROTEIN PO) Take  by mouth. Nai Garces MD        Social History:   Social History     Tobacco Use   • Smoking status: Never   • Smokeless tobacco: Never   • Tobacco comments:     current non smoker unknown if ever smoked   Vaping Use   • Vaping Use: Never used   Substance Use Topics   • Alcohol use: Not Currently   • Drug use: Not Currently         Review of  "Systems:  Review of Systems   Constitutional: Negative for chills and fever.   HENT: Negative for congestion, rhinorrhea and sore throat.    Eyes: Negative for pain and visual disturbance.   Respiratory: Negative for apnea, cough, chest tightness and shortness of breath.    Cardiovascular: Negative for chest pain and palpitations.   Gastrointestinal: Negative for abdominal pain, diarrhea, nausea and vomiting.   Genitourinary: Negative for difficulty urinating and dysuria.   Musculoskeletal: Negative for joint swelling and myalgias.   Skin: Negative for color change.   Neurological: Positive for weakness and headaches. Negative for seizures.   Psychiatric/Behavioral: Negative.    All other systems reviewed and are negative.       Physical Exam:  /56 (BP Location: Left arm, Patient Position: Lying)   Pulse 93   Temp 99.6 °F (37.6 °C) (Oral)   Resp 20   Ht 165.1 cm (65\")   Wt 48.2 kg (106 lb 4.2 oz)   SpO2 100%   BMI 17.68 kg/m²     Physical Exam  Vitals and nursing note reviewed.   Constitutional:       General: She is not in acute distress.     Appearance: Normal appearance. She is not toxic-appearing.   HENT:      Head: Normocephalic and atraumatic.      Jaw: There is normal jaw occlusion.   Eyes:      General: Lids are normal.      Extraocular Movements: Extraocular movements intact.      Conjunctiva/sclera: Conjunctivae normal.      Pupils: Pupils are equal, round, and reactive to light.   Cardiovascular:      Rate and Rhythm: Normal rate and regular rhythm.      Pulses: Normal pulses.      Heart sounds: Normal heart sounds.   Pulmonary:      Effort: Pulmonary effort is normal. No respiratory distress.      Breath sounds: Normal breath sounds. No wheezing or rhonchi.   Abdominal:      General: Abdomen is flat.      Palpations: Abdomen is soft.      Tenderness: There is no abdominal tenderness. There is no guarding or rebound.   Musculoskeletal:         General: Normal range of motion.      Cervical " back: Normal range of motion and neck supple.      Right lower leg: No edema.      Left lower leg: No edema.   Skin:     General: Skin is warm and dry.   Neurological:      Mental Status: She is alert and oriented to person, place, and time. Mental status is at baseline.   Psychiatric:         Mood and Affect: Mood normal.                  Procedures:  Procedures      Medical Decision Making:      Comorbidities that affect care:    None    External Notes reviewed:    Hospital Discharge Summary: Patient was recently admitted for metabolic encephalopathy due to an acute UTI      The following orders were placed and all results were independently analyzed by me:  Orders Placed This Encounter   Procedures   • Blood Culture - Blood,   • Blood Culture - Blood,   • Influenza Antigen, Rapid - Swab, Nasopharynx   • COVID-19,APTIMA PANTHER([facility]),[facility], NP/OP SWAB IN UTM/VTM/SALINE TRANSPORT MEDIA,24 HR TAT - Swab, Nasal Cavity   • XR Chest 1 View   • CT Head Without Contrast   • [place] Draw   • Comprehensive Metabolic Panel   • Single High Sensitivity Troponin T   • Magnesium   • Urinalysis With Culture If Indicated -   • CBC Auto Differential   • Lactic Acid, Plasma   • Diet: Regular/House Diet; Texture: Regular Texture (IDDSI 7); Fluid Consistency: Thin (IDDSI 0)   • Undress & Gown   • Continuous Pulse Oximetry   • Vital Signs   • Orthostatic Blood Pressure   • Straight Cath   • Discontinue Cardiac Monitoring   • Inpatient Hospitalist Consult   • DIET MESSAGE Can u please this new pt a tray   • Oxygen Therapy- Nasal Cannula; Titrate 1-6 LPM Per SpO2; 90 - 95%   • POC Glucose Once   • ECG 12 Lead ED Triage Standing Order; Weak / Dizzy / AMS   • Insert Peripheral IV   • Initiate Observation Status   • Fall Precautions   • CBC & Differential   • Green Top (Gel)   • Lavender Top   • Gold Top - SST   • Light Blue Top       Medications Given in the Emergency Department:  Medications   sodium chloride 0.9 % flush 10 mL  (has no administration in time range)   cefTRIAXone (ROCEPHIN) IVPB 1 g (0 g Intravenous Stopped 5/20/23 1013)   sodium chloride 0.9 % bolus 1,000 mL (0 mL Intravenous Stopped 5/20/23 1615)        ED Course:    ED Course as of 05/20/23 1844   Sat May 20, 2023   1841 EKG:    Rhythm: sinus  Rate: 89  Axis: normal  Intervals: normal  ST Segment: no elevations        Interpreted by me   [BN]      ED Course User Index  [BN] Lavern Marion MD       Labs:    Lab Results (last 24 hours)     Procedure Component Value Units Date/Time    CBC & Differential [[id number]]  (Abnormal) Collected: 05/20/23 0706    Specimen: Blood Updated: 05/20/23 0712    Narrative:      The following orders were created for panel order CBC & Differential.  Procedure                               Abnormality         Status                     ---------                               -----------         ------                     CBC Auto Differential[[id number]]        Abnormal            Final result                 Please view results for these tests on the individual orders.    Comprehensive Metabolic Panel [[id number]]  (Abnormal) Collected: 05/20/23 0706    Specimen: Blood Updated: 05/20/23 0734     Glucose 110 mg/dL      BUN 17 mg/dL      Creatinine 1.29 mg/dL      Sodium 139 mmol/L      Potassium 4.1 mmol/L      Chloride 101 mmol/L      CO2 26.3 mmol/L      Calcium 9.1 mg/dL      Total Protein 6.5 g/dL      Albumin 4.3 g/dL      ALT (SGPT) 12 U/L      AST (SGOT) 23 U/L      Alkaline Phosphatase 78 U/L      Total Bilirubin 0.6 mg/dL      Globulin 2.2 gm/dL      A/G Ratio 2.0 g/dL      BUN/Creatinine Ratio 13.2     Anion Gap 11.7 mmol/L      eGFR 43.1 mL/min/1.73     Narrative:      GFR Normal >60  Chronic Kidney Disease <60  Kidney Failure <15    The GFR formula is only valid for adults with stable renal function between ages 18 and 70.    Single High Sensitivity Troponin T [[id number]]  (Abnormal) Collected: 05/20/23 0706    Specimen: Blood  Updated: 05/20/23 0734     HS Troponin T 36 ng/L     Narrative:      High Sensitive Troponin T Reference Range:  <10.0 ng/L- Negative Female for AMI  <15.0 ng/L- Negative Male for AMI  >=10 - Abnormal Female indicating possible myocardial injury.  >=15 - Abnormal Male indicating possible myocardial injury.   Clinicians would have to utilize clinical acumen, EKG, Troponin, and serial changes to determine if it is an Acute Myocardial Infarction or myocardial injury due to an underlying chronic condition.         Magnesium [[id number]]  (Normal) Collected: 05/20/23 0706    Specimen: Blood Updated: 05/20/23 0734     Magnesium 1.9 mg/dL     CBC Auto Differential [[id number]]  (Abnormal) Collected: 05/20/23 0706    Specimen: Blood Updated: 05/20/23 0712     WBC 8.71 10*3/mm3      RBC 3.83 10*6/mm3      Hemoglobin 11.3 g/dL      Hematocrit 34.8 %      MCV 90.9 fL      MCH 29.5 pg      MCHC 32.5 g/dL      RDW 13.7 %      RDW-SD 45.6 fl      MPV 9.8 fL      Platelets 184 10*3/mm3      Neutrophil % 87.8 %      Lymphocyte % 4.4 %      Monocyte % 6.9 %      Eosinophil % 0.5 %      Basophil % 0.2 %      Immature Grans % 0.2 %      Neutrophils, Absolute 7.65 10*3/mm3      Lymphocytes, Absolute 0.38 10*3/mm3      Monocytes, Absolute 0.60 10*3/mm3      Eosinophils, Absolute 0.04 10*3/mm3      Basophils, Absolute 0.02 10*3/mm3      Immature Grans, Absolute 0.02 10*3/mm3      nRBC 0.0 /100 WBC     Lactic Acid, Plasma [[id number]]  (Normal) Collected: 05/20/23 0917    Specimen: Blood Updated: 05/20/23 0954     Lactate 1.4 mmol/L     Blood Culture - Blood, Arm, Right [[id number]] Collected: 05/20/23 0917    Specimen: Blood from Arm, Right Updated: 05/20/23 0933    Blood Culture - Blood, Arm, Left [[id number]] Collected: 05/20/23 0917    Specimen: Blood from Arm, Left Updated: 05/20/23 0932    Urinalysis With Culture If Indicated - Urine, Clean Catch [[id number]]  (Normal) Collected: 05/20/23 1127    Specimen: Urine, Clean Catch Updated:  05/20/23 1138     Color, UA Yellow     Appearance, UA Clear     pH, UA 8.0     Specific Gravity, UA 1.012     Glucose, UA Negative     Ketones, UA Negative     Bilirubin, UA Negative     Blood, UA Negative     Protein, UA Negative     Leuk Esterase, UA Negative     Nitrite, UA Negative     Urobilinogen, UA 0.2 E.U./dL    Narrative:      In absence of clinical symptoms, the presence of pyuria, bacteria, and/or nitrites on the urinalysis result does not correlate with infection.  Urine microscopic not indicated.    Influenza Antigen, Rapid - Swab, Nasopharynx [[id number]]  (Normal) Collected: 05/20/23 1402    Specimen: Swab from Nasopharynx Updated: 05/20/23 1437     Influenza A Ag, EIA Negative     Influenza B Ag, EIA Negative    COVID-19,APTIMA PANTHER([facility]),[facility], NP/OP SWAB IN UTM/VTM/SALINE TRANSPORT MEDIA,24 HR TAT - Swab, Nasal Cavity [[id number]] Collected: 05/20/23 1402    Specimen: Swab from Nasal Cavity Updated: 05/20/23 1406           Imaging:    CT Head Without Contrast    Result Date: 5/20/2023  PROCEDURE: CT HEAD WO CONTRAST  COMPARISON:  [facility], CT, HEAD W/O CONTRAST, 2/27/2017, 23:07.  [facility], MR, MRI BRAIN W WO CONTRAST, 4/02/2023, 7:25. INDICATIONS: headache AFTER FALL/WEAKNESS  PROTOCOL:   Standard imaging protocol performed    RADIATION:   DLP: 1018.2 mGy*cm   MA and/or KV was adjusted to minimize radiation dose.     TECHNIQUE: After obtaining the patient's consent, CT images were obtained without non-ionic intravenous contrast material.  FINDINGS:  There is no acute intracranial hemorrhage or extra-axial collection. The ventricles appear normal in caliber, with no evidence of mass effect or midline shift. The basal cisterns are patent. The gray-white differentiation is preserved.  The calvarium is intact. The paranasal sinuses are clear. The mastoid air cells are well-aerated.       No acute intracranial process or calvarial fracture identified.     ANGELITO  MD LUDMILA       Electronically Signed and Approved By: ANGELITO KEYS MD on 5/20/2023 at 9:21             XR Chest 1 View    Result Date: 5/20/2023  PROCEDURE: XR CHEST 1 VW  COMPARISON: [facility], CR, XR CHEST 1 VW, 4/20/2023, 11:30.  INDICATIONS: confusion  FINDINGS:  LUNGS: Normal.  No significant pulmonary parenchymal abnormalities.  VASCULATURE: Normal.  Unremarkable pulmonary vasculature.  CARDIAC: Normal.  No cardiac silhouette abnormality or cardiomegaly.  MEDIASTINUM: Normal.  No visible mass or adenopathy.  PLEURA: Normal.  No effusion or pleural thickening.  BONES: Normal.  No fracture or visible bony lesion.  OTHER: Negative.        No acute cardiopulmonary process identified.       ANGELITO KEYS MD       Electronically Signed and Approved By: ANGELITO KEYS MD on 5/20/2023 at 8:36                 Differential Diagnosis and Discussion:    Weakness: Based on the patient's history, signs, and symptoms, the diffential diagnosis includes but is not limited to meningitis, stroke, sepsis, subarachnoid hemorrhage, intracranial bleeding, encephalitis, acute uti, dehydration, MS, myasthenia gravis, Guillan Holderness, migraine variant, neuromuscular disorders vertigo, electrolyte imbalance, and metabolic disorders.    All labs were reviewed and interpreted by me.  All X-rays impressions were independently interpreted by me.  EKG was interpreted by me.  CT scan radiology impression was interpreted by me.      The patient´s CBC that was reviewed and interpreted by me shows no abnormalities of critical concern. Of note, there is no anemia requiring a blood transfusion and the platelet count is acceptable.  The patient´s CMP that was reviewed and interpretted by me shows no abnormalities of critical concern. Of note, the patient´s sodium and potassium are acceptable. The patient´s liver enzymes are unremarkable. The patient´s renal function (creatinine) is preserved. The patient has a normal  anion gap.  Troponin is 36.  Urinalysis is negative for hematuria, ketonuria and bacteria.  Influenza swab is negative.  Lactic acid is 1.4.  CT scan of the head is negative for acute cranial abnormalities.  Chest x-ray is negative for acute infiltrate.  MDM     Decision making regarding admission:    This 78-year-old female who has fallen due to weakness and sustained a head injury requires hospital admission due to several important factors:    Head Injury Evaluation: The patient has sustained a head injury, the seriousness of which may not be immediately evident. Potential complications such as concussion, intracranial bleeding, or skull fractures need to be ruled out. These conditions can have delayed onset, making close monitoring crucial.    Neurological Monitoring: Hospital admission allows for regular neurological checks to identify any changes in consciousness, cognition, strength, sensation, coordination, or other neurological functions. These checks can help detect any delayed effects of the head injury.    Cause of Weakness and Fall: The underlying cause of the patient's weakness and fall needs to be determined. This could be a new medical issue such as a stroke, heart disease, infection, metabolic disorder, or medication side effect. It could also be due to a worsening of a chronic condition.    Age and Risk of Complications: At 78 years old, this patient is at higher risk of complications from a fall and head injury. Elderly patients can have a slower and more complicated recovery process, and they often have comorbid conditions that can further complicate their management.    Fall Risk Assessment and Prevention: A falls assessment can help identify why the fall occurred (e.g., balance problems, medication issues, home hazards) and what can be done to prevent future falls. This is particularly important as one fall can often be a predictor of future falls.    Rehabilitation Needs: The patient may  require physical therapy or other rehabilitation to help recover from the fall, improve strength and balance, and reduce the risk of future falls.    In conclusion, hospital admission would allow for a thorough evaluation of the patient's head injury, close monitoring of her neurological status, investigation of the cause of her weakness and fall, management of any identified conditions, and planning for fall prevention strategies and potential rehabilitation needs.        Patient Care Considerations:    none      Consultants/Shared Management Plan:    PCP: I have discussed the case with Dr. Barroso who agrees to accept the patient for admission.    Social Determinants of Health:    Patient is independent, reliable, and has access to care.       Disposition and Care Coordination:    Admit:   Through independent evaluation of the patient's history, physical, and imperical data, the patient meets criteria for observation/admission to the hospital.        Final diagnoses:   Weakness        ED Disposition     ED Disposition   Decision to Admit    Condition   --    Comment   Level of Care: Med/Surg [1]   Diagnosis: Weakness [630310]   Admitting Physician: RONNY BARROSO [[id number]]               This medical record created using voice recognition software.           Lavern Marion MD  05/20/23 4491

## 2023-05-21 PROBLEM — U07.1 COVID-19: Status: ACTIVE | Noted: 2023-05-21

## 2023-05-21 LAB
CRP SERPL-MCNC: 1.72 MG/DL (ref 0–0.5)
D DIMER PPP FEU-MCNC: 0.92 MCGFEU/ML (ref 0–0.76)
MAGNESIUM SERPL-MCNC: 1.9 MG/DL (ref 1.6–2.4)
PROCALCITONIN SERPL-MCNC: 0.1 NG/ML (ref 0–0.25)

## 2023-05-21 PROCEDURE — 25010000002 ENOXAPARIN PER 10 MG: Performed by: INTERNAL MEDICINE

## 2023-05-21 PROCEDURE — 85379 FIBRIN DEGRADATION QUANT: CPT | Performed by: INTERNAL MEDICINE

## 2023-05-21 PROCEDURE — 25010000002 REMDESIVIR 100 MG RECONSTITUTED SOLUTION: Performed by: INTERNAL MEDICINE

## 2023-05-21 RX ORDER — LISINOPRIL 10 MG/1
10 TABLET ORAL DAILY
Status: DISCONTINUED | OUTPATIENT
Start: 2023-05-21 | End: 2023-05-26 | Stop reason: HOSPADM

## 2023-05-21 RX ORDER — HYDROCODONE BITARTRATE AND ACETAMINOPHEN 5; 325 MG/1; MG/1
1 TABLET ORAL EVERY 6 HOURS PRN
Status: DISCONTINUED | OUTPATIENT
Start: 2023-05-21 | End: 2023-05-26 | Stop reason: HOSPADM

## 2023-05-21 RX ORDER — GABAPENTIN 300 MG/1
600 CAPSULE ORAL EVERY 8 HOURS SCHEDULED
Status: DISCONTINUED | OUTPATIENT
Start: 2023-05-21 | End: 2023-05-26 | Stop reason: HOSPADM

## 2023-05-21 RX ORDER — ENOXAPARIN SODIUM 100 MG/ML
30 INJECTION SUBCUTANEOUS EVERY 24 HOURS
Status: DISCONTINUED | OUTPATIENT
Start: 2023-05-21 | End: 2023-05-26 | Stop reason: HOSPADM

## 2023-05-21 RX ORDER — HYDROCODONE BITARTRATE AND ACETAMINOPHEN 5; 325 MG/1; MG/1
1 TABLET ORAL EVERY 6 HOURS PRN
Status: CANCELLED | OUTPATIENT
Start: 2023-05-21

## 2023-05-21 RX ORDER — ONDANSETRON 4 MG/1
4 TABLET, FILM COATED ORAL 3 TIMES DAILY PRN
Status: CANCELLED | OUTPATIENT
Start: 2023-05-21

## 2023-05-21 RX ADMIN — LISINOPRIL 10 MG: 10 TABLET ORAL at 15:57

## 2023-05-21 RX ADMIN — GABAPENTIN 600 MG: 300 CAPSULE ORAL at 14:54

## 2023-05-21 RX ADMIN — HYDROCODONE BITARTRATE AND ACETAMINOPHEN 1 TABLET: 5; 325 TABLET ORAL at 14:54

## 2023-05-21 RX ADMIN — GABAPENTIN 600 MG: 300 CAPSULE ORAL at 22:48

## 2023-05-21 RX ADMIN — ENOXAPARIN SODIUM 30 MG: 100 INJECTION SUBCUTANEOUS at 14:54

## 2023-05-21 RX ADMIN — REMDESIVIR 200 MG: 100 INJECTION, POWDER, LYOPHILIZED, FOR SOLUTION INTRAVENOUS at 15:57

## 2023-05-21 NOTE — H&P
[facility]   HISTORY AND PHYSICAL  [facility]   HISTORY AND PHYSICAL    Patient Name: Devi Bobo  : 1945  MRN: [id number]  Primary Care Physician: Raad Del Toro MD  Date of admission: 2023    Subjective   Subjective     Chief Complaint:   Weakness    History of Present Illness   The patient is a 78-year-old woman who was brought to the emergency department by her family with complaints of weakness  History was obtained from the patient.  The patient states that she was sitting on the toilet and she somehow slid off the toilet onto the floor and was not able to get up.  She did not lose consciousness and did not try to get up off the toilet when she slid off the toilet and she does not know how she ended up on the floor.    At any rate, her grandson assisted him off the floor and she felt weak and needed his assistance to ambulate back to the bedroom  Family then decided to bring her to the emergency department for further evaluation  Evaluation in the emergency department was unremarkable except that her temperature was 100.7 and on examination the patient appeared weak and she could not ambulate on her own  The emergency department physician strongly recommended admission and I therefore admitted the patient for observation care  Later in the night, the COVID test that had been performed in the emergency department was reported to be positive  Her grandson who lives with her had COVID-19 infection about 10 days ago but she states that he isolated himself in a room  Aside from weakness, she has no other symptoms like fever, chills, sore throat, body ache, joint pains and shortness of breath  Oxygen saturation has been in normal range    Review of Systems   Constitutional: Positive for activity change, appetite change, fatigue and fever. Negative for chills and unexpected weight change.   HENT: Negative.    Eyes: Negative.    Respiratory: Negative.    Cardiovascular: Negative.     Gastrointestinal: Negative.    Endocrine: Negative.    Genitourinary: Negative.    Musculoskeletal: Positive for arthralgias and back pain.   Allergic/Immunologic: Negative.    Neurological: Negative.    Hematological: Negative.    Psychiatric/Behavioral: Negative.         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 OR;  Service: Gynecology;  Laterality: N/A;   • TUBAL ABDOMINAL LIGATION         Family History: family history includes Colon cancer in her sister; Heart disease in her father, mother, and sister; Thyroid disease in her mother and sister. Otherwise pertinent FHx was reviewed and not pertinent to current issue.    Social History:  reports that she has never smoked. She has never used smokeless tobacco. She reports that she does not currently use alcohol. She reports that she does not currently use drugs.    Home Medications:  Calcium Carbonate-Vitamin D, HYDROcodone-acetaminophen, denosumab, estradiol, gabapentin, lisinopril, multivitamin with minerals, and ondansetron      Allergies:  Allergies   Allergen Reactions   • Sulfa Antibiotics Anaphylaxis   • Nitrofurantoin Confusion       Objective    Objective     Vitals:  Temp:  [98.6 °F (37 °C)-100.7 °F (38.2  °C)] 98.8 °F (37.1 °C)  Heart Rate:  [85-98] 95  Resp:  [18-20] 18  BP: (112-133)/(56-76) 131/76    Physical Exam  Vitals and nursing note reviewed.   Constitutional:       Appearance: Normal appearance.   HENT:      Head: Normocephalic and atraumatic.      Nose: Nose normal.   Eyes:      Pupils: Pupils are equal, round, and reactive to light.   Cardiovascular:      Rate and Rhythm: Normal rate and regular rhythm.   Pulmonary:      Effort: Pulmonary effort is normal.   Abdominal:      General: Abdomen is flat.   Musculoskeletal:         General: Normal range of motion.      Cervical back: Normal range of motion.   Skin:     General: Skin is warm.   Neurological:      General: No focal deficit present.      Mental Status: She is alert.         Result Review    Result Review:  I have personally reviewed the results from the time of this admission to 05/21/23 1:32 PM EDT and agree with these findings:  [x]  Laboratory  [x]  Microbiology  []  Radiology  []  EKG/Telemetry   []  Cardiology/Vascular   []  Pathology  []  Old records  []  Other:      Current Facility-Administered Medications:   •  acetaminophen (TYLENOL) tablet 650 mg, 650 mg, Oral, Q4H PRN, Ty Barroso MD  •  Pharmacy Consult - Remdesivir for Mild to Moderate COVID-19 (Within 5 days of symptom onset)- only if contraindicated to Paxlovid, , Does not apply, Continuous PRN, Ty Barroso MD  •  sodium chloride 0.9 % flush 10 mL, 10 mL, Intravenous, PRN, Lavern Marion MD       Assessment & Plan   Assessment / Plan       Active Hospital Problems:  Active Hospital Problems    Diagnosis    • **Weakness    • COVID-19        Plan:   Admit to regular floor  Start remdesivir per protocol  Continue home medications  Physical and Occupational Therapy evaluation and treatment  DVT prophylaxis  Check D-dimer, CRP and procalcitonin levels             Electronically signed by Ty Barroso MD, 05/21/23, 1:32 PM EDT.

## 2023-05-21 NOTE — PLAN OF CARE
Goal Outcome Evaluation:  Plan of Care Reviewed With: patient  Progress: no change  Outcome Evaluation: VSS with slight temp, greatest being 100.7. Pt resulted Covid-19 detected this shift. Pt appeared to rest well, no complaints of pain.

## 2023-05-21 NOTE — PLAN OF CARE
Goal Outcome Evaluation:  Plan of Care Reviewed With: patient        Progress: improving  Outcome Evaluation: PT is AAOx4, VSS. No c/o of SOA (on RA) N/V/D. Medicated once with PRN [place] for chronic back pain. Started on Remdesivir, tolerated well. x1 assist to BSC, remains with generalized weakness. No acute events or new concerns voiced this shift. Continue to monitor with current POC.

## 2023-05-22 PROBLEM — J02.9 PHARYNGITIS: Status: ACTIVE | Noted: 2023-05-22

## 2023-05-22 LAB
25(OH)D3 SERPL-MCNC: 54 NG/ML (ref 30–100)
ALBUMIN SERPL-MCNC: 3.7 G/DL (ref 3.5–5.2)
ALBUMIN/GLOB SERPL: 1.5 G/DL
ALP SERPL-CCNC: 69 U/L (ref 39–117)
ALT SERPL W P-5'-P-CCNC: 12 U/L (ref 1–33)
ANION GAP SERPL CALCULATED.3IONS-SCNC: 12.6 MMOL/L (ref 5–15)
AST SERPL-CCNC: 32 U/L (ref 1–32)
BILIRUB SERPL-MCNC: 0.3 MG/DL (ref 0–1.2)
BUN SERPL-MCNC: 14 MG/DL (ref 8–23)
BUN/CREAT SERPL: 14.9 (ref 7–25)
CALCIUM SPEC-SCNC: 8.6 MG/DL (ref 8.6–10.5)
CHLORIDE SERPL-SCNC: 105 MMOL/L (ref 98–107)
CO2 SERPL-SCNC: 19.4 MMOL/L (ref 22–29)
CREAT SERPL-MCNC: 0.94 MG/DL (ref 0.57–1)
EGFRCR SERPLBLD CKD-EPI 2021: 63 ML/MIN/1.73
GLOBULIN UR ELPH-MCNC: 2.5 GM/DL
GLUCOSE SERPL-MCNC: 100 MG/DL (ref 65–99)
POTASSIUM SERPL-SCNC: 3.8 MMOL/L (ref 3.5–5.2)
PROT SERPL-MCNC: 6.2 G/DL (ref 6–8.5)
S PYO AG THROAT QL: NEGATIVE
SODIUM SERPL-SCNC: 137 MMOL/L (ref 136–145)
WHOLE BLOOD HOLD SPECIMEN: NORMAL

## 2023-05-22 PROCEDURE — 25010000002 REMDESIVIR 100 MG/20ML SOLUTION: Performed by: INTERNAL MEDICINE

## 2023-05-22 PROCEDURE — 82306 VITAMIN D 25 HYDROXY: CPT | Performed by: INTERNAL MEDICINE

## 2023-05-22 PROCEDURE — 87880 STREP A ASSAY W/OPTIC: CPT | Performed by: INTERNAL MEDICINE

## 2023-05-22 PROCEDURE — 87081 CULTURE SCREEN ONLY: CPT | Performed by: INTERNAL MEDICINE

## 2023-05-22 PROCEDURE — 97165 OT EVAL LOW COMPLEX 30 MIN: CPT

## 2023-05-22 PROCEDURE — 87147 CULTURE TYPE IMMUNOLOGIC: CPT | Performed by: INTERNAL MEDICINE

## 2023-05-22 PROCEDURE — 25010000002 AZITHROMYCIN PER 500 MG: Performed by: INTERNAL MEDICINE

## 2023-05-22 PROCEDURE — 80053 COMPREHEN METABOLIC PANEL: CPT | Performed by: INTERNAL MEDICINE

## 2023-05-22 PROCEDURE — 25010000002 ENOXAPARIN PER 10 MG: Performed by: INTERNAL MEDICINE

## 2023-05-22 PROCEDURE — 97161 PT EVAL LOW COMPLEX 20 MIN: CPT

## 2023-05-22 RX ADMIN — GABAPENTIN 600 MG: 300 CAPSULE ORAL at 22:51

## 2023-05-22 RX ADMIN — SODIUM CHLORIDE 500 MG: 9 INJECTION, SOLUTION INTRAVENOUS at 11:32

## 2023-05-22 RX ADMIN — ENOXAPARIN SODIUM 30 MG: 100 INJECTION SUBCUTANEOUS at 15:39

## 2023-05-22 RX ADMIN — HYDROCODONE BITARTRATE AND ACETAMINOPHEN 1 TABLET: 5; 325 TABLET ORAL at 06:13

## 2023-05-22 RX ADMIN — REMDESIVIR 100 MG: 100 INJECTION, POWDER, LYOPHILIZED, FOR SOLUTION INTRAVENOUS at 13:06

## 2023-05-22 RX ADMIN — GABAPENTIN 600 MG: 300 CAPSULE ORAL at 15:39

## 2023-05-22 RX ADMIN — LISINOPRIL 10 MG: 10 TABLET ORAL at 08:43

## 2023-05-22 RX ADMIN — GABAPENTIN 600 MG: 300 CAPSULE ORAL at 06:13

## 2023-05-22 NOTE — THERAPY EVALUATION
Acute Care - Physical Therapy Initial Evaluation  [facility]     Patient Name: Devi Bobo  : 1945  MRN: [id number]  Today's Date: 2023      Visit Dx: Admit date: 2023     Referring Physician: Lamont Prince MD     Surgery Date:* No surgery found *            ICD-10-CM ICD-9-CM   1. Weakness  R53.1 780.79   2. Decreased activities of daily living (ADL)  Z78.9 V49.89   3. Difficulty in walking  R26.2 719.7     Patient Active Problem List   Diagnosis   • Bronchitis   • Cataract   • Chronic pelvic pain in female   • Degeneration of lumbar intervertebral disc   • Encounter for long-term (current) use of insulin   • Frequency of micturition   • Herniation of intervertebral disc   • High blood pressure   • HTN (hypertension)   • Lumbar spondylosis   • Nocturia   • Osteoporosis   • Pain, generalized   • Urge incontinence   • Bladder disorder   • Female bladder prolapse   • Urinary incontinence   • Urinary urgency   • Piriformis syndrome of both sides   • Vaginal atrophy   • Atypical squamous cells cannot exclude high grade squamous intraepithelial lesion on cytologic smear of cervix (ASC-H)   • High grade squamous intraepithelial lesion (HGSIL) on cytologic smear of cervix   • Sciatic nerve lesion   • Osteoarthritis   • Lumbosacral spondylosis without myelopathy   • Chronic vertigo   • Chronic fatigue syndrome   • Cervical spondylosis without myelopathy   • Abnormal gait   • Dysplasia of cervix, low grade (CASH 1)   • Postoperative visit   • Postmenopausal osteoporosis   • Sepsis, due to unspecified organism, unspecified whether acute organ dysfunction present   • Severe Malnutrition (HCC)   • Weakness   • COVID-19     Past Medical History:   Diagnosis Date   • Age-related osteoporosis without current pathological fracture    • Allergy     SULFA DRUGS   MILD   • Atypical squamous cells cannot exclude high grade squamous intraepithelial lesion on cytologic smear of cervix (ASC-H)    • High grade squamous  intraepithelial lesion on cytologic smear of cervix (HGSIL)    • Hypertension    • Lesion of sciatic nerve, bilateral lower limbs    • Osteoporosis    • Overactive bladder    • Piriformis syndrome    • Polyp of colon    • Postmenopausal atrophic vaginitis    • Vaginal atrophy      Past Surgical History:   Procedure Laterality Date   • COLONOSCOPY  2019   • LEEP N/A 1/6/2022    Procedure: LOOP ELECTROCAUTERY EXCISION PROCEDURE;  Surgeon: El Álvarez MD;  Location: [facility] OR;  Service: Gynecology;  Laterality: N/A;   • TUBAL ABDOMINAL LIGATION       PT Assessment (last 12 hours)     PT Evaluation and Treatment     Row Name 05/22/23 1054          Physical Therapy Time and Intention    Subjective Information no complaints (P)   -JF     Document Type evaluation (P)   -JF     Mode of Treatment individual therapy;physical therapy (P)   -JF     Patient Effort good (P)   -JF     Symptoms Noted During/After Treatment none (P)   -     Row Name 05/22/23 1054          General Information    Patient Profile Reviewed yes (P)   -JF     Patient Observations alert;cooperative;agree to therapy (P)   -JF     Prior Level of Function independent:;gait;transfer;bed mobility;ADL's (P)   -JF     Equipment Currently Used at Home none (P)   Patient noted her sister recently ordered her a rollator but has not used it  -JF     Existing Precautions/Restrictions fall (P)   airborne precautions  -JF     Barriers to Rehab none identified (P)   -     Row Name 05/22/23 1054          Living Environment    Current Living Arrangements apartment (P)   -JF     Home Accessibility wheelchair accessible (P)   -JF     People in Home child(barry), adult;child(barry), dependent (P)   -JF     Primary Care Provided by self (P)   -     Row Name 05/22/23 1054          Range of Motion (ROM)    Range of Motion ROM is WFL;bilateral lower extremities (P)   -     Row Name 05/22/23 1055          Strength (Manual Muscle Testing)    Strength (Manual  Muscle Testing) bilateral lower extremities (P)   BLE: grossly 4/5  -     Row Name 05/22/23 1054          Bed Mobility    Bed Mobility bed mobility (all) activities (P)   -     All Activities, [place] (Bed Mobility) not tested (P)   Patient sitting upright in recliner upon entering the room  -[facility] Name 05/22/23 1054          Transfers    Transfers sit-stand transfer (P)   -[facility] Name 05/22/23 1054          Sit-Stand Transfer    Sit-Stand [place] (Transfers) contact guard;1 person assist (P)   -     Assistive Device (Sit-Stand Transfers) walker, front-wheeled (P)   [facility]  -     Row Name 05/22/23 1054          Gait/Stairs (Locomotion)    Gait/Stairs Locomotion gait/ambulation assistive device (P)   -     [place] Level (Gait) contact guard;1 person assist (P)   -     Assistive Device (Gait) walker, front-wheeled (P)   [facility]  -     Distance in Feet (Gait) 20 (P)   x2  -JF     Deviations/Abnormal Patterns (Gait) base of support, narrow;festinating/shuffling;gait speed decreased (P)   -JF     Bilateral Gait Deviations forward flexed posture (P)   -     Comment, (Gait/Stairs) Patient does not use an AD at baseline. Ambulated the first time [facility]. [place] the patient was struggling to balance and hold herslef up, so we tried walking with a rolling walker. Patient appeared more stable walking with this. (P)   -     Row Name 05/22/23 1054          Safety Issues, Functional Mobility    Impairments Affecting Function (Mobility) balance;endurance/activity tolerance;strength (P)   -[facility] Name 05/22/23 1054          Balance    Balance Assessment standing dynamic balance (P)   -     Dynamic Standing Balance contact guard;1-person assist (P)   -     Position/Device Used, Standing Balance supported;walker, front-wheeled (P)   A  -[facility] Name 05/22/23 1054          Plan of Care Review    Plan of Care Reviewed With patient (P)   -     Outcome Evaluation Patient presents with  limitations including balance, strength, endurance, and ambulation. She will benefit from physical therapy services while in the hospital. Upon discharge, it is recommended she returns home with her son and grandsons and follows up with home health therapy services. (P)   -     Row Name 05/22/23 1054          Therapy Assessment/Plan (PT)    Rehab Potential (PT) good, to achieve stated therapy goals (P)   -     Criteria for Skilled Interventions Met (PT) skilled treatment is necessary (P)   -JF     Therapy Frequency (PT) daily (P)   -JF     Predicted Duration of Therapy Intervention (PT) 10 days (P)   -JF     Problem List (PT) problems related to;balance;mobility;strength (P)   -JF     Activity Limitations Related to Problem List (PT) unable to ambulate safely (P)   -     Row Name 05/22/23 1054          PT Evaluation Complexity    History, PT Evaluation Complexity no personal factors and/or comorbidities (P)   -     Examination of Body Systems (PT Eval Complexity) total of 4 or more elements (P)   -JF     Clinical Presentation (PT Evaluation Complexity) stable (P)   -     Clinical Decision Making (PT Evaluation Complexity) low complexity (P)   -JF     Overall Complexity (PT Evaluation Complexity) low complexity (P)   -     Row Name 05/22/23 1054          Therapy Plan Review/Discharge Plan (PT)    Therapy Plan Review (PT) evaluation/treatment results reviewed;patient (P)   -[facility] Name 05/22/23 1054          Physical Therapy Goals    Transfer Goal Selection (PT) transfer, PT goal 1 (P)   -     Gait Training Goal Selection (PT) gait training, PT goal 1 (P)   -     Row Name 05/22/23 1051          Transfer Goal 1 (PT)    Activity/Assistive Device (Transfer Goal 1, PT) sit-to-stand/stand-to-sit;bed-to-chair/chair-to-bed (P)   -     [place] Level/Cues Needed (Transfer Goal 1, PT) independent (P)   -     Time Frame (Transfer Goal 1, PT) long term goal (LTG);10 days (P)   -     Row Name  05/22/23 1054          Gait Training Goal 1 (PT)    Activity/Assistive Device (Gait Training Goal 1, PT) gait (walking locomotion);increase endurance/gait distance;improve balance and speed (P)   -JF     [place] Level (Gait Training Goal 1, PT) independent (P)   -JF     Distance (Gait Training Goal 1, PT) 100 (P)   -JF     Time Frame (Gait Training Goal 1, PT) long term goal (LTG);10 days (P)   -JF           User Key  (r) = Recorded By, (t) = Taken By, (c) = Cosigned By    Initials Name Provider Type    Simon Marie, PT Student PT Student                  PT Recommendation and Plan  Anticipated Discharge Disposition (PT): (P) home with home health  Planned Therapy Interventions (PT): (P) balance training, bed mobility training, gait training, strengthening, transfer training  Therapy Frequency (PT): (P) daily  Plan of Care Reviewed With: (P) patient  Outcome Evaluation: (P) Patient presents with limitations including balance, strength, endurance, and ambulation. She will benefit from physical therapy services while in the hospital. Upon discharge, it is recommended she returns home with her son and grandsons and follows up with home health therapy services.   Outcome Measures     Row Name 05/22/23 1100             How much help from another person do you currently need...    Turning from your back to your side while in flat bed without using bedrails? 4 (P)   -JF      Moving from lying on back to sitting on the side of a flat bed without bedrails? 3 (P)   -JF      Moving to and from a bed to a chair (including a wheelchair)? 3 (P)   -JF      Standing up from a chair using your arms (e.g., wheelchair, bedside chair)? 3 (P)   -JF      Climbing 3-5 steps with a railing? 2 (P)   -JF      To walk in hospital room? 3 (P)   -JF      AM-PAC 6 Clicks Score (PT) 18 (P)   -JF         Functional Assessment    Outcome Measure Options AM-PAC 6 Clicks Basic Mobility (PT) (P)   -JF            User Key  (r) = Recorded  By, (t) = Taken By, (c) = Cosigned By    Initials Name Provider Type    Simon Marie, PT Student PT Student                 Time Calculation:    PT Charges     Row Name 05/22/23 1107             Time Calculation    PT Received On 05/22/23 (P)   -JF      PT Goal Re-Cert Due Date 05/31/23 (P)   -JF         Untimed Charges    PT Eval/Re-eval Minutes 35 (P)   -JF         Total Minutes    Untimed Charges Total Minutes 35 (P)   -JF       Total Minutes 35 (P)   -JF            User Key  (r) = Recorded By, (t) = Taken By, (c) = Cosigned By    Initials Name Provider Type    Simon Marie, PT Student PT Student              Therapy Charges for Today     Code Description Service Date Service Provider Modifiers Qty    [id number] HC PT EVAL LOW COMPLEXITY 3 5/22/2023 Simon Esparza, PT Student GP 1          PT G-Codes  Outcome Measure Options: (P) AM-PAC 6 Clicks Basic Mobility (PT)  AM-PAC 6 Clicks Score (PT): (P) 18  AM-PAC 6 Clicks Score (OT): 16    Simon Esparza PT Student  5/22/2023

## 2023-05-22 NOTE — THERAPY EVALUATION
Patient Name: Devi Bobo  : 1945    MRN: [id number]                              Today's Date: 2023       Admit Date: 2023    Visit Dx:     ICD-10-CM ICD-9-CM   1. Weakness  R53.1 780.79   2. Decreased activities of daily living (ADL)  Z78.9 V49.89     Patient Active Problem List   Diagnosis   • Bronchitis   • Cataract   • Chronic pelvic pain in female   • Degeneration of lumbar intervertebral disc   • Encounter for long-term (current) use of insulin   • Frequency of micturition   • Herniation of intervertebral disc   • High blood pressure   • HTN (hypertension)   • Lumbar spondylosis   • Nocturia   • Osteoporosis   • Pain, generalized   • Urge incontinence   • Bladder disorder   • Female bladder prolapse   • Urinary incontinence   • Urinary urgency   • Piriformis syndrome of both sides   • Vaginal atrophy   • Atypical squamous cells cannot exclude high grade squamous intraepithelial lesion on cytologic smear of cervix (ASC-H)   • High grade squamous intraepithelial lesion (HGSIL) on cytologic smear of cervix   • Sciatic nerve lesion   • Osteoarthritis   • Lumbosacral spondylosis without myelopathy   • Chronic vertigo   • Chronic fatigue syndrome   • Cervical spondylosis without myelopathy   • Abnormal gait   • Dysplasia of cervix, low grade (CASH 1)   • Postoperative visit   • Postmenopausal osteoporosis   • Sepsis, due to unspecified organism, unspecified whether acute organ dysfunction present   • Severe Malnutrition (HCC)   • Weakness   • COVID-19     Past Medical History:   Diagnosis Date   • Age-related osteoporosis without current pathological fracture    • Allergy     SULFA DRUGS   MILD   • Atypical squamous cells cannot exclude high grade squamous intraepithelial lesion on cytologic smear of cervix (ASC-H)    • 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)    • Hypertension    • Lesion of sciatic nerve, bilateral lower limbs    • Osteoporosis    •  Overactive bladder    • Piriformis syndrome    • Polyp of colon    • Postmenopausal atrophic vaginitis    • Vaginal atrophy      Past Surgical History:   Procedure Laterality Date   • COLONOSCOPY  2019   • LEEP N/A 1/6/2022    Procedure: LOOP ELECTROCAUTERY EXCISION PROCEDURE;  Surgeon: El Álvarez MD;  Location: [facility] MAIN OR;  Service: Gynecology;  Laterality: N/A;   • TUBAL ABDOMINAL LIGATION        General Information     Row Name 05/22/23 1012          OT Time and Intention    Document Type evaluation  -PG     Mode of Treatment individual therapy;occupational therapy  -PG     Row Name 05/22/23 1012          General Information    Patient Profile Reviewed yes  Patient lives at home with her children and grandchildren.  Reports independence with all ADLs and functional transfers using no assistive device.  -PG     Prior Level of Function independent:  -PG     Existing Precautions/Restrictions fall  -PG     Barriers to Rehab none identified  -PG     Row Name 05/22/23 1012          Occupational Profile    Reason for Services/Referral (Occupational Profile) Patient is a pleasant 78-year-old female admitted for weakness and falling due to COVID.  No previous OT services identified.  Patient is being evaluated by Occupational Therapy due to decline in ADL function  -PG     Row Name 05/22/23 1012          Living Environment    People in Home child(barry), adult;grandchild(barry)  -PG     Row Name 05/22/23 1012          Cognition    Orientation Status (Cognition) oriented x 3  -PG     Row Name 05/22/23 1012          Safety Issues, Functional Mobility    Impairments Affecting Function (Mobility) balance;endurance/activity tolerance;strength  -PG           User Key  (r) = Recorded By, (t) = Taken By, (c) = Cosigned By    Initials Name Provider Type    PG Domenico Charles, OT Occupational Therapist                 Mobility/ADL's     Row Name 05/22/23 1014          Transfers    Transfers bed-chair transfer  -PG     Row  Name 05/22/23 1014          Bed-Chair Transfer    Bed-Chair [place] (Transfers) contact guard;verbal cues  -PG     Row Name 05/22/23 1014          Activities of Daily Living    BADL Assessment/Intervention bathing;upper body dressing;lower body dressing;grooming;toileting  -PG     Row Name 05/22/23 1014          Bathing Assessment/Intervention    [place] Level (Bathing) bathing skills;minimum assist (75% patient effort)  -PG     Row Name 05/22/23 1014          Upper Body Dressing Assessment/Training    [place] Level (Upper Body Dressing) upper body dressing skills;set up  -PG     Row Name 05/22/23 1014          Lower Body Dressing Assessment/Training    [place] Level (Lower Body Dressing) lower body dressing skills;moderate assist (50% patient effort)  -PG     Row Name 05/22/23 1014          Grooming Assessment/Training    [place] Level (Grooming) grooming skills;set up  -PG     Row Name 05/22/23 1014          Toileting Assessment/Training    [place] Level (Toileting) toileting skills;minimum assist (75% patient effort)  -PG           User Key  (r) = Recorded By, (t) = Taken By, (c) = Cosigned By    Initials Name Provider Type    PG Domenico Charles OT Occupational Therapist               Obj/Interventions     Row Name 05/22/23 1015          Sensory Assessment (Somatosensory)    Sensory Assessment (Somatosensory) sensation intact  -PG     Row Name 05/22/23 1015          Vision Assessment/Intervention    Visual Impairment/Limitations WFL  -PG     Row Name 05/22/23 1015          Range of Motion Comprehensive    General Range of Motion no range of motion deficits identified  -     Row Name 05/22/23 1015          Strength Comprehensive (MMT)    Comment, General Manual Muscle Testing (MMT) Assessment 4-4+/5 bilateral upper extremity strength  -PG     Row Name 05/22/23 1015          Motor Skills    Motor Skills coordination;functional endurance  -PG     Coordination WFL  -PG     Functional  Endurance Fair minus  -PG           User Key  (r) = Recorded By, (t) = Taken By, (c) = Cosigned By    Initials Name Provider Type    PG Domenico Charles, OT Occupational Therapist               Goals/Plan     Row Name 05/22/23 1016          Transfer Goal 1 (OT)    Activity/Assistive Device (Transfer Goal 1, OT) transfers, all  -PG     [place] Level/Cues Needed (Transfer Goal 1, OT) modified independence  -PG     Time Frame (Transfer Goal 1, OT) long term goal (LTG);10 days  -PG     Row Name 05/22/23 1016          Bathing Goal 1 (OT)    Activity/Device (Bathing Goal 1, OT) bathing skills, all  -PG     [place] Level/Cues Needed (Bathing Goal 1, OT) modified independence  -PG     Time Frame (Bathing Goal 1, OT) long term goal (LTG);10 days  -PG     Row Name 05/22/23 1016          Dressing Goal 1 (OT)    Activity/Device (Dressing Goal 1, OT) dressing skills, all  -PG     [place]/Cues Needed (Dressing Goal 1, OT) modified independence  -PG     Time Frame (Dressing Goal 1, OT) long term goal (LTG);10 days  -PG     Row Name 05/22/23 1016          Toileting Goal 1 (OT)    Activity/Device (Toileting Goal 1, OT) toileting skills, all  -PG     [place] Level/Cues Needed (Toileting Goal 1, OT) modified independence  -PG     Time Frame (Toileting Goal 1, OT) long term goal (LTG);10 days  -PG     Row Name 05/22/23 1016          Grooming Goal 1 (OT)    Activity/Device (Grooming Goal 1, OT) grooming skills, all  -PG     [place] (Grooming Goal 1, OT) modified independence  -PG     Time Frame (Grooming Goal 1, OT) long term goal (LTG);10 days  -PG     Row Name 05/22/23 1016          Problem Specific Goal 1 (OT)    Problem Specific Goal 1 (OT) Patient will improve activity tolerance to fair plus to support independence and engagement with ADL activity  -PG     Time Frame (Problem Specific Goal 1, OT) long term goal (LTG);10 days  -PG     Row Name 05/22/23 1016          Therapy Assessment/Plan (OT)    Planned  Therapy Interventions (OT) activity tolerance training;strengthening exercise;transfer/mobility retraining;patient/caregiver education/training;occupation/activity based interventions  -PG           User Key  (r) = Recorded By, (t) = Taken By, (c) = Cosigned By    Initials Name Provider Type    PG Domenico Charles, OT Occupational Therapist               Clinical Impression     Row Name 05/22/23 1016          Pain Assessment    Pretreatment Pain Rating 7/10  -PG     Posttreatment Pain Rating 7/10  -PG     Pain Location - throat  -PG     Pain Intervention(s) Nursing Notified  -PG     Row Name 05/22/23 1016          Plan of Care Review    Plan of Care Reviewed With patient  -PG     Progress no change  -PG     Outcome Evaluation Patient presents with limitations affecting strength, activity tolerance, and balance impacting patient's ability to return home safely and independently.  The skills of a therapist will be required to safely and effectively implement the following treatment plan to restore maximal level of function  -PG     Row Name 05/22/23 1016          Therapy Assessment/Plan (OT)    Patient/Family Therapy Goal Statement (OT) Get stronger and return home independently  -PG     Rehab Potential (OT) good, to achieve stated therapy goals  -PG     Criteria for Skilled Therapeutic Interventions Met (OT) yes;meets criteria;skilled treatment is necessary  -PG     Therapy Frequency (OT) 5 times/wk  -PG     Row Name 05/22/23 1016          Therapy Plan Review/Discharge Plan (OT)    Anticipated Discharge Disposition (OT) home with home health  -PG           User Key  (r) = Recorded By, (t) = Taken By, (c) = Cosigned By    Initials Name Provider Type    PG Domenico Charles, OT Occupational Therapist               Outcome Measures     Row Name 05/22/23 1017          How much help from another is currently needed...    Putting on and taking off regular lower body clothing? 2  -PG     Bathing (including washing, rinsing, and  drying) 2  -PG     Toileting (which includes using toilet bed pan or urinal) 2  -PG     Putting on and taking off regular upper body clothing 3  -PG     Taking care of personal grooming (such as brushing teeth) 3  -PG     Eating meals 4  -PG     [facility] 6 Clicks Score (OT) 16  -PG     Row Name 05/21/23 9496          How much help from another person do you currently need...    Turning from your back to your side while in flat bed without using bedrails? 4  -AR     Moving from lying on back to sitting on the side of a flat bed without bedrails? 3  -AR     Moving to and from a bed to a chair (including a wheelchair)? 3  -AR     Standing up from a chair using your arms (e.g., wheelchair, bedside chair)? 3  -AR     Climbing 3-5 steps with a railing? 2  -AR     To walk in hospital room? 3  -AR     [facility] 6 Clicks Score (PT) 18  -AR     Highest level of mobility 6 --> Walked 10 steps or more  -AR     Row Name 05/22/23 1017          Functional Assessment    Outcome Measure Options AM-[facility] 6 Clicks Daily Activity (OT);Optimal Instrument  -PG     Row Name 05/22/23 1017          Optimal Instrument    Optimal Instrument Optimal - 3  -PG     Bending/Stooping 3  -PG     Standing 2  -PG     Reaching 1  -PG     From the list, choose the 3 activities you would most like to be able to do without any difficulty Bending/stooping;Standing;Reaching  -PG     Total Score Optimal - 3 6  -PG           User Key  (r) = Recorded By, (t) = Taken By, (c) = Cosigned By    Initials Name Provider Type    Anushka Romero, RN Registered Nurse    PG Domenico Charles, KENNETH Occupational Therapist                Occupational Therapy Education     Title: PT OT SLP Therapies (Done)     Topic: Occupational Therapy (Done)     Point: ADL training (Done)     Description:   Instruct learner(s) on proper safety adaptation and remediation techniques during self care or transfers.   Instruct in proper use of assistive devices.              Learning Progress Summary            Patient Acceptance, E,D, DU by PG at 5/22/2023 1018                   Point: Home exercise program (Done)     Description:   Instruct learner(s) on appropriate technique for monitoring, assisting and/or progressing therapeutic exercises/activities.              Learning Progress Summary           Patient Acceptance, E,D, DU by PG at 5/22/2023 1018                   Point: Precautions (Done)     Description:   Instruct learner(s) on prescribed precautions during self-care and functional transfers.              Learning Progress Summary           Patient Acceptance, E,D, DU by PG at 5/22/2023 1018                   Point: Body mechanics (Done)     Description:   Instruct learner(s) on proper positioning and spine alignment during self-care, functional mobility activities and/or exercises.              Learning Progress Summary           Patient Acceptance, E,D, DU by PG at 5/22/2023 1018                               User Key     Initials Effective Dates Name Provider Type Discipline     06/16/21 -  Domenico Charles OT Occupational Therapist OT              OT Recommendation and Plan  Planned Therapy Interventions (OT): activity tolerance training, strengthening exercise, transfer/mobility retraining, patient/caregiver education/training, occupation/activity based interventions  Therapy Frequency (OT): 5 times/wk  Plan of Care Review  Plan of Care Reviewed With: patient  Progress: no change  Outcome Evaluation: Patient presents with limitations affecting strength, activity tolerance, and balance impacting patient's ability to return home safely and independently.  The skills of a therapist will be required to safely and effectively implement the following treatment plan to restore maximal level of function     Time Calculation:    Time Calculation- OT     Row Name 05/22/23 1019             Time Calculation- OT    OT Received On 05/22/23  -      OT Goal Re-Cert Due Date 05/31/23  -         Untimed Charges     OT Eval/Re-eval Minutes 35  -PG         Total Minutes    Untimed Charges Total Minutes 35  -PG       Total Minutes 35  -PG            User Key  (r) = Recorded By, (t) = Taken By, (c) = Cosigned By    Initials Name Provider Type    PG Domenico Charles OT Occupational Therapist              Therapy Charges for Today     Code Description Service Date Service Provider Modifiers Qty    [id number] HC OT EVAL LOW COMPLEXITY 3 5/22/2023 Domenico Charles OT GO 1               Domenico Charles OT  5/22/2023

## 2023-05-22 NOTE — PROGRESS NOTES
[facility]     Progress Note    Patient Name: Devi Bobo  : 1945  MRN: [id number]  Primary Care Physician:  Raad Del Toro MD  Date of admission: 2023      Subjective   Brief summary.  Patient admitted with weakness.  Further work-up showed COVID-positive test.      HPI:   *Overall feeling better today.  But also complains of some sore throat, no fever chills.  No shortness of breath    Review of Systems     Denies any fever chills  Minimal cough and shortness of breath  Sore throat  Fatigue and weakness      Objective     Vitals:   Temp:  [97.8 °F (36.6 °C)-99.6 °F (37.6 °C)] 97.8 °F (36.6 °C)  Heart Rate:  [79-94] 82  Resp:  [16-18] 16  BP: (100-144)/(61-68) 125/68    Physical Exam :     Elderly female not in acute distress.  Mild pharyngeal redness.  Heart regular.  Lungs diminished breath sound but clear bilaterally.  Abdomen soft nontender      Result Review:  I have personally reviewed the results from the time of this admission to 2023 18:22 EDT and agree with these findings:  []  Laboratory  []  Microbiology  []  Radiology  []  EKG/Telemetry   []  Cardiology/Vascular   []  Pathology  []  Old records  []  Other:           Assessment / Plan       Active Hospital Problems:  Active Hospital Problems    Diagnosis    • **Weakness    • Pharyngitis    • COVID-19        Plan:   Patient on remdesivir started on admission.  Feeling better.  We will check strep throat.  Add Zithromax.  Continue supportive care.       DVT prophylaxis:  Medical DVT prophylaxis orders are present.    CODE STATUS:   Level Of Support Discussed With: Patient  Code Status (Patient has no pulse and is not breathing): CPR (Attempt to Resuscitate)  Medical Interventions (Patient has pulse or is breathing): Full Support  Release to patient: Routine Release            Electronically signed by Lamont Prince MD, 23, 6:20 PM EDT.

## 2023-05-22 NOTE — PLAN OF CARE
Goal Outcome Evaluation:  Plan of Care Reviewed With: patient        Progress: no change  Outcome Evaluation: Pt. medicated once this shift with [facility] for c/o pain in throat. VSS.

## 2023-05-22 NOTE — PLAN OF CARE
Problem: Fatigue  Goal: Improved Activity Tolerance  Outcome: Ongoing, Progressing  Intervention: Promote Improved Energy  Recent Flowsheet Documentation  Taken 5/22/2023 0843 by Elly Garcia RN  Activity Management: up in chair  Goal: Improved Activity Tolerance  Outcome: Ongoing, Progressing  Intervention: Promote Improved Energy  Recent Flowsheet Documentation  Taken 5/22/2023 0843 by Elly Garcia RN  Activity Management: up in chair     Problem: Fall Injury Risk  Goal: Absence of Fall and Fall-Related Injury  Outcome: Ongoing, Progressing  Intervention: Promote Injury-Free Environment  Recent Flowsheet Documentation  Taken 5/22/2023 1829 by Elly Garcia RN  Safety Promotion/Fall Prevention: safety round/check completed  Taken 5/22/2023 1613 by Elly Garcia RN  Safety Promotion/Fall Prevention: safety round/check completed  Taken 5/22/2023 1312 by Elly Garcia RN  Safety Promotion/Fall Prevention: safety round/check completed  Taken 5/22/2023 1144 by Elly Garcia RN  Safety Promotion/Fall Prevention: safety round/check completed  Taken 5/22/2023 1132 by Elly Garcia RN  Safety Promotion/Fall Prevention: safety round/check completed  Taken 5/22/2023 0843 by Elly Garcia RN  Safety Promotion/Fall Prevention: safety round/check completed  Taken 5/22/2023 0730 by Elly Garcia RN  Safety Promotion/Fall Prevention: safety round/check completed   Goal Outcome Evaluation:  Plan of Care Reviewed With: patient        Progress: no change  Outcome Evaluation: Vital signs stable. No complaints from patient. See discharge care plans for further details.

## 2023-05-22 NOTE — CONSULTS
"Nutrition Services    Patient Name: Devi Bobo  YOB: 1945  MRN: [id number]  Admission date: 5/20/2023      CLINICAL NUTRITION ASSESSMENT      Reason for Assessment  Identified at risk by screening criteria, BMI     H&P:    Past Medical History:   Diagnosis Date   • Age-related osteoporosis without current pathological fracture    • Allergy     SULFA DRUGS   MILD   • Atypical squamous cells cannot exclude high grade squamous intraepithelial lesion on cytologic smear of cervix (ASC-H)    • 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)    • Hypertension    • Lesion of sciatic nerve, bilateral lower limbs    • Osteoporosis    • Overactive bladder    • Piriformis syndrome    • Polyp of colon    • Postmenopausal atrophic vaginitis    • Vaginal atrophy         Current Problems:   Active Hospital Problems    Diagnosis    • **Weakness    • COVID-19         Nutrition/Diet History         Narrative     Patient admitted with COVID-19 infection. Followed by RD for BMI <18.5. Patient relates she has had a decreased appetite since becoming sick. Reports  lbs. Patient has had an 8 lb weight loss in an unspecified amount of time, but weight records can confirm since October 2022. Patient meets criteria for severe malnutrition with severe muscle wasting and fat loss. Relates she is interested in continuing home usage of vanilla Boost and is receptive to three times daily with meals. Encouraged patient to increase frequency to 3-4 times daily at home. Patient verbalized understanding. RD will order and CTM per protocol.      Anthropometrics        Current Height, Weight Height: 165.1 cm (65\")  Weight: 48.2 kg (106 lb 4.2 oz)   Current BMI Body mass index is 17.68 kg/m².       Weight Hx  Wt Readings from Last 30 Encounters:   05/20/23 0647 48.2 kg (106 lb 4.2 oz)   05/08/23 1316 48.8 kg (107 lb 9.4 oz)   04/20/23 1039 48.4 kg (106 lb 11.2 oz)   04/03/23 0626 48.1 kg (106 lb " 0.7 oz)   04/02/23 0702 48.2 kg (106 lb 4.2 oz)   04/01/23 0218 42.7 kg (94 lb 2.2 oz)   03/31/23 2058 49 kg (108 lb 0.4 oz)   10/13/22 0935 52.1 kg (114 lb 12.8 oz)   09/21/22 1410 52.9 kg (116 lb 10 oz)   01/19/22 1559 53.5 kg (118 lb)   01/06/22 0853 53.9 kg (118 lb 13.3 oz)   12/01/21 1400 54 kg (119 lb)   10/20/21 1109 52.5 kg (115 lb 12.8 oz)   10/05/21 1147 52.8 kg (116 lb 6.4 oz)   12/15/20 0000 55.1 kg (121 lb 8 oz)   07/03/18 0000 52.7 kg (116 lb 2 oz)            Wt Change Observation -7.5% x 7 months - not significant     Estimated/Assessed Needs       Energy Requirements 35-40 kcal/kg   EST Needs (kcal/day) 9637-6605       Protein Requirements 1.5 g/kg   EST Daily Needs (g/day) 72       Fluid Requirements 30 ml/kg    Estimated Needs (mL/day) 1446     Labs/Medications         Pertinent Labs Reviewed.   Results from last 7 days   Lab Units 05/22/23  0518 05/20/23  0706   SODIUM mmol/L 137 139   POTASSIUM mmol/L 3.8 4.1   CHLORIDE mmol/L 105 101   CO2 mmol/L 19.4* 26.3   BUN mg/dL 14 17   CREATININE mg/dL 0.94 1.29*   CALCIUM mg/dL 8.6 9.1   BILIRUBIN mg/dL 0.3 0.6   ALK PHOS U/L 69 78   ALT (SGPT) U/L 12 12   AST (SGOT) U/L 32 23   GLUCOSE mg/dL 100* 110*     Results from last 7 days   Lab Units 05/20/23  0706   MAGNESIUM mg/dL 1.9  1.9   HEMOGLOBIN g/dL 11.3*   HEMATOCRIT % 34.8     COVID19   Date Value Ref Range Status   05/20/2023 Detected (C) Not Detected - Ref. Range Final     No results found for: HGBA1C      Pertinent Medications Reviewed.     Current Nutrition Orders & Evaluation of Intake       Oral Nutrition     Current PO Diet Diet: Regular/House Diet; Texture: Regular Texture (IDDSI 7); Fluid Consistency: Thin (IDDSI 0)   Supplement Orders Placed This Encounter      DIET MESSAGE Can u please this new pt a tray      Dietary Nutrition Supplements Boost Plus (Ensure Plus); vanilla       Malnutrition Severity Assessment      Patient meets criteria for : Severe Malnutrition  Malnutrition Type (last  8 hours)     Malnutrition Severity Assessment     Row Name 05/22/23 1150       Malnutrition Severity Assessment    Malnutrition Type Chronic Disease - Related Malnutrition    Row Name 05/22/23 1150       Muscle Loss    Loss of Muscle Mass Findings Severe    [place] Region Severe - deep hollowing/scooping, lack of muscle to touch, facial bones well defined    Clavicle Bone Region Severe - protruding prominent bone    Acromion Bone Region Severe - squared shoulders, bones, and acromion process protrusion prominent    Dorsal Hand Region Severe - prominent depression    Row Name 05/22/23 1150       Fat Loss    Subcutaneous Fat Loss Findings Severe    Orbital Region  Severe - pronounced hollowness/depression, dark circles, loose saggy skin    Upper Arm Region Severe - mostly skin, very little space between folds, fingers touch    Row Name 05/22/23 1150       Criteria Met (Must meet criteria for severity in at least 2 of these categories: M Wasting, Fat Loss, Fluid, Secondary Signs, Wt. Status, Intake)    Patient meets criteria for  Severe Malnutrition                   Nutrition Diagnosis         Nutrition Dx Problem 1 Severe malnutrition related to inadequate energy Intake as evidenced by decreased appetite., unintended wt change., body composition changes., patient report., family report., per nursing documentation. and report of minimal PO intake.       Nutrition Intervention         Vanilla Boost Plus TID (+1080 kcal, 42 g protein daily)     Medical Nutrition Therapy/Nutrition Education          Learner     Readiness Patient  Acceptance     Method     Response Explanation  Verbalizes understanding     Monitor/Evaluation        Monitor Per protocol, PO intake, Supplement intake, Pertinent labs, Weight       Nutrition Discharge Plan         To be determined       Electronically signed by:  Felecia May RD  05/22/23 11:55 EDT

## 2023-05-22 NOTE — PLAN OF CARE
Goal Outcome Evaluation:  Plan of Care Reviewed With: (P) patient           Outcome Evaluation: (P) Patient presents with limitations including balance, strength, endurance, and ambulation. She will benefit from physical therapy services while in the hospital. Upon discharge, it is recommended she returns home with her son and grandsons and follows up with home health therapy services.

## 2023-05-22 NOTE — PLAN OF CARE
Goal Outcome Evaluation:  Plan of Care Reviewed With: patient        Progress: no change  Outcome Evaluation: Patient presents with limitations affecting strength, activity tolerance, and balance impacting patient's ability to return home safely and independently.  The skills of a therapist will be required to safely and effectively implement the following treatment plan to restore maximal level of function

## 2023-05-23 PROBLEM — E43 SEVERE MALNUTRITION: Chronic | Status: ACTIVE | Noted: 2023-05-23

## 2023-05-23 PROBLEM — E43 SEVERE MALNUTRITION: Status: ACTIVE | Noted: 2023-05-23

## 2023-05-23 LAB
ALBUMIN SERPL-MCNC: 3.5 G/DL (ref 3.5–5.2)
ALBUMIN/GLOB SERPL: 1.6 G/DL
ALP SERPL-CCNC: 62 U/L (ref 39–117)
ALT SERPL W P-5'-P-CCNC: 9 U/L (ref 1–33)
ANION GAP SERPL CALCULATED.3IONS-SCNC: 8.1 MMOL/L (ref 5–15)
AST SERPL-CCNC: 28 U/L (ref 1–32)
BACTERIA SPEC AEROBE CULT: NORMAL
BASOPHILS # BLD AUTO: 0.03 10*3/MM3 (ref 0–0.2)
BASOPHILS NFR BLD AUTO: 0.9 % (ref 0–1.5)
BILIRUB SERPL-MCNC: 0.3 MG/DL (ref 0–1.2)
BUN SERPL-MCNC: 15 MG/DL (ref 8–23)
BUN/CREAT SERPL: 16 (ref 7–25)
CALCIUM SPEC-SCNC: 8.2 MG/DL (ref 8.6–10.5)
CHLORIDE SERPL-SCNC: 106 MMOL/L (ref 98–107)
CO2 SERPL-SCNC: 25.9 MMOL/L (ref 22–29)
CREAT SERPL-MCNC: 0.94 MG/DL (ref 0.57–1)
DEPRECATED RDW RBC AUTO: 45.2 FL (ref 37–54)
EGFRCR SERPLBLD CKD-EPI 2021: 63 ML/MIN/1.73
EOSINOPHIL # BLD AUTO: 0.08 10*3/MM3 (ref 0–0.4)
EOSINOPHIL NFR BLD AUTO: 2.4 % (ref 0.3–6.2)
ERYTHROCYTE [DISTWIDTH] IN BLOOD BY AUTOMATED COUNT: 13.5 % (ref 12.3–15.4)
GLOBULIN UR ELPH-MCNC: 2.2 GM/DL
GLUCOSE SERPL-MCNC: 96 MG/DL (ref 65–99)
HCT VFR BLD AUTO: 34.6 % (ref 34–46.6)
HGB BLD-MCNC: 11.3 G/DL (ref 12–15.9)
IMM GRANULOCYTES # BLD AUTO: 0.01 10*3/MM3 (ref 0–0.05)
IMM GRANULOCYTES NFR BLD AUTO: 0.3 % (ref 0–0.5)
LYMPHOCYTES # BLD AUTO: 0.97 10*3/MM3 (ref 0.7–3.1)
LYMPHOCYTES NFR BLD AUTO: 29.4 % (ref 19.6–45.3)
MCH RBC QN AUTO: 29.7 PG (ref 26.6–33)
MCHC RBC AUTO-ENTMCNC: 32.7 G/DL (ref 31.5–35.7)
MCV RBC AUTO: 90.8 FL (ref 79–97)
MONOCYTES # BLD AUTO: 0.5 10*3/MM3 (ref 0.1–0.9)
MONOCYTES NFR BLD AUTO: 15.2 % (ref 5–12)
NEUTROPHILS NFR BLD AUTO: 1.71 10*3/MM3 (ref 1.7–7)
NEUTROPHILS NFR BLD AUTO: 51.8 % (ref 42.7–76)
NRBC BLD AUTO-RTO: 0 /100 WBC (ref 0–0.2)
PLATELET # BLD AUTO: 172 10*3/MM3 (ref 140–450)
PMV BLD AUTO: 10.5 FL (ref 6–12)
POTASSIUM SERPL-SCNC: 4.1 MMOL/L (ref 3.5–5.2)
PROT SERPL-MCNC: 5.7 G/DL (ref 6–8.5)
RBC # BLD AUTO: 3.81 10*6/MM3 (ref 3.77–5.28)
SODIUM SERPL-SCNC: 140 MMOL/L (ref 136–145)
WBC NRBC COR # BLD: 3.3 10*3/MM3 (ref 3.4–10.8)

## 2023-05-23 PROCEDURE — 25010000002 ENOXAPARIN PER 10 MG: Performed by: INTERNAL MEDICINE

## 2023-05-23 PROCEDURE — 97116 GAIT TRAINING THERAPY: CPT

## 2023-05-23 PROCEDURE — 25010000002 AZITHROMYCIN PER 500 MG: Performed by: INTERNAL MEDICINE

## 2023-05-23 PROCEDURE — 80053 COMPREHEN METABOLIC PANEL: CPT | Performed by: INTERNAL MEDICINE

## 2023-05-23 PROCEDURE — 25010000002 REMDESIVIR 100 MG RECONSTITUTED SOLUTION: Performed by: INTERNAL MEDICINE

## 2023-05-23 PROCEDURE — 85025 COMPLETE CBC W/AUTO DIFF WBC: CPT | Performed by: INTERNAL MEDICINE

## 2023-05-23 RX ORDER — AZITHROMYCIN 250 MG/1
500 TABLET, FILM COATED ORAL EVERY 24 HOURS
Status: COMPLETED | OUTPATIENT
Start: 2023-05-24 | End: 2023-05-24

## 2023-05-23 RX ORDER — AMOXICILLIN 250 MG
2 CAPSULE ORAL 2 TIMES DAILY
Status: DISCONTINUED | OUTPATIENT
Start: 2023-05-23 | End: 2023-05-26 | Stop reason: HOSPADM

## 2023-05-23 RX ORDER — BISACODYL 5 MG/1
10 TABLET, DELAYED RELEASE ORAL DAILY PRN
Status: DISCONTINUED | OUTPATIENT
Start: 2023-05-23 | End: 2023-05-26 | Stop reason: HOSPADM

## 2023-05-23 RX ADMIN — GABAPENTIN 600 MG: 300 CAPSULE ORAL at 15:45

## 2023-05-23 RX ADMIN — HYDROCODONE BITARTRATE AND ACETAMINOPHEN 1 TABLET: 5; 325 TABLET ORAL at 21:19

## 2023-05-23 RX ADMIN — DOCUSATE SODIUM 50MG AND SENNOSIDES 8.6MG 2 TABLET: 8.6; 5 TABLET, FILM COATED ORAL at 21:06

## 2023-05-23 RX ADMIN — LISINOPRIL 10 MG: 10 TABLET ORAL at 08:36

## 2023-05-23 RX ADMIN — REMDESIVIR 100 MG: 100 INJECTION, POWDER, LYOPHILIZED, FOR SOLUTION INTRAVENOUS at 12:53

## 2023-05-23 RX ADMIN — GABAPENTIN 600 MG: 300 CAPSULE ORAL at 06:12

## 2023-05-23 RX ADMIN — SODIUM CHLORIDE 500 MG: 9 INJECTION, SOLUTION INTRAVENOUS at 08:37

## 2023-05-23 RX ADMIN — ENOXAPARIN SODIUM 30 MG: 100 INJECTION SUBCUTANEOUS at 15:45

## 2023-05-23 RX ADMIN — GABAPENTIN 600 MG: 300 CAPSULE ORAL at 21:06

## 2023-05-23 NOTE — PLAN OF CARE
Problem: Fatigue  Goal: Improved Activity Tolerance  Outcome: Ongoing, Progressing  Goal: Improved Activity Tolerance  Outcome: Ongoing, Progressing   Goal Outcome Evaluation:  Plan of Care Reviewed With: patient        Progress: no change  Outcome Evaluation: Vital signs stable. Patient would benefit with rehab physical therapy. Stool softners ordered. See discharge care plan for further details.

## 2023-05-23 NOTE — PROGRESS NOTES
[facility]     Progress Note    Patient Name: Devi Bobo  : 1945  MRN: [id number]  Primary Care Physician:  Raad Del Toro MD  Date of admission: 2023      Subjective   Brief summary.  Patient admitted with weakness.  Further work-up showed COVID-positive test.      HPI:  Patient feels better, not eating much, patient says she is not eating much for some time and losing some weight, minimal weight loss she has been skinny all her life according to her.  Patient reports she is constipated now and has to do disimpaction today by nursing staff.  Denies any fever or chills.  Shortness of breath improving, continues to remain weak.    Review of Systems     No fever chill  Decreased appetite  Sore throat  Fatigue and weakness      Objective     Vitals:   Temp:  [97.5 °F (36.4 °C)-98.6 °F (37 °C)] 98.6 °F (37 °C)  Heart Rate:  [78-96] 94  Resp:  [16] 16  BP: (103-142)/(56-70) 142/70    Physical Exam :     Elderly female not in acute distress.  Pharyngeal redness improved  Heart regular.  Lungs diminished breath sound but clear bilaterally.  Abdomen soft nontender  Extremities no edema    Result Review:  I have personally reviewed the results from the time of this admission to 2023 15:49 EDT and agree with these findings:  []  Laboratory  []  Microbiology  []  Radiology  []  EKG/Telemetry   []  Cardiology/Vascular   []  Pathology  []  Old records  []  Other:           Assessment / Plan       Active Hospital Problems:  Active Hospital Problems    Diagnosis    • **Weakness    • Severe malnutrition    • Pharyngitis    • COVID-19        Plan:   Continue remdesivir, continue Z-Yossi, throat pain improved, strep negative  For constipation we will add stool softeners  Increase calorie intake for chronic malnutrition       DVT prophylaxis:  Medical DVT prophylaxis orders are present.    CODE STATUS:   Level Of Support Discussed With: Patient  Code Status (Patient has no pulse and is not breathing):  CPR (Attempt to Resuscitate)  Medical Interventions (Patient has pulse or is breathing): Full Support  Release to patient: Routine Release              Electronically signed by Lamont Prince MD, 05/23/23, 3:49 PM EDT.

## 2023-05-23 NOTE — THERAPY TREATMENT NOTE
Acute Care - Physical Therapy Treatment Note  [facility]     Patient Name: Devi Bobo  : 1945  MRN: [id number]  Today's Date: 2023      Visit Dx:     ICD-10-CM ICD-9-CM   1. Weakness  R53.1 780.79   2. Decreased activities of daily living (ADL)  Z78.9 V49.89   3. Difficulty in walking  R26.2 719.7     Patient Active Problem List   Diagnosis   • Bronchitis   • Cataract   • Chronic pelvic pain in female   • Degeneration of lumbar intervertebral disc   • Encounter for long-term (current) use of insulin   • Frequency of micturition   • Herniation of intervertebral disc   • High blood pressure   • HTN (hypertension)   • Lumbar spondylosis   • Nocturia   • Osteoporosis   • Pain, generalized   • Urge incontinence   • Bladder disorder   • Female bladder prolapse   • Urinary incontinence   • Urinary urgency   • Piriformis syndrome of both sides   • Vaginal atrophy   • Atypical squamous cells cannot exclude high grade squamous intraepithelial lesion on cytologic smear of cervix (ASC-H)   • High grade squamous intraepithelial lesion (HGSIL) on cytologic smear of cervix   • Sciatic nerve lesion   • Osteoarthritis   • Lumbosacral spondylosis without myelopathy   • Chronic vertigo   • Chronic fatigue syndrome   • Cervical spondylosis without myelopathy   • Abnormal gait   • Dysplasia of cervix, low grade (CASH 1)   • Postoperative visit   • Postmenopausal osteoporosis   • Sepsis, due to unspecified organism, unspecified whether acute organ dysfunction present   • Severe Malnutrition (HCC)   • Weakness   • COVID-19   • Pharyngitis     Past Medical History:   Diagnosis Date   • Age-related osteoporosis without current pathological fracture    • Allergy     SULFA DRUGS   MILD   • Atypical squamous cells cannot exclude high grade squamous intraepithelial lesion on cytologic smear of cervix (ASC-H)    • 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)    • Hypertension    • Lesion of  sciatic nerve, bilateral lower limbs    • Osteoporosis    • Overactive bladder    • Piriformis syndrome    • Polyp of colon    • Postmenopausal atrophic vaginitis    • Vaginal atrophy      Past Surgical History:   Procedure Laterality Date   • COLONOSCOPY  2019   • LEEP N/A 1/6/2022    Procedure: LOOP ELECTROCAUTERY EXCISION PROCEDURE;  Surgeon: El Álvarez MD;  Location: [facility] MAIN OR;  Service: Gynecology;  Laterality: N/A;   • TUBAL ABDOMINAL LIGATION       PT Assessment (last 12 hours)     PT Evaluation and Treatment     Row Name 05/23/23 1200          Physical Therapy Time and Intention    Subjective Information no complaints (P)   -     Document Type therapy note (daily note) (P)   -JF     Mode of Treatment individual therapy;physical therapy (P)   -     Patient Effort good (P)   -     Symptoms Noted During/After Treatment fatigue (P)   -     Row Name 05/23/23 1200          General Information    Patient Profile Reviewed yes (P)   -     Patient Observations alert;cooperative;agree to therapy (P)   -     Equipment Currently Used at Home none (P)   -     Existing Precautions/Restrictions fall (P)   -     Barriers to Rehab none identified (P)   -     Row Name 05/23/23 1200          Bed Mobility    Bed Mobility scooting/bridging;sit-supine (P)   -     Scooting/Bridging [place] (Bed Mobility) standby assist (P)   -     Sit-Supine [place] (Bed Mobility) standby assist (P)   -     Row Name 05/23/23 1200          Transfers    Transfers chair-bed transfer;sit-stand transfer (P)   -     Row Name 05/23/23 1200          Chair-Bed Transfer    Chair-Bed [place] (Transfers) contact guard (P)   -     Assistive Device (Chair-Bed Transfers) -- (P)   HHA  -     Row Name 05/23/23 1200          Sit-Stand Transfer    Sit-Stand [place] (Transfers) contact guard (P)   -     Assistive Device (Sit-Stand Transfers) walker, front-wheeled (P)   -     Row Name 05/23/23 1200           Gait/Stairs (Locomotion)    Gait/Stairs Locomotion gait/ambulation assistive device (P)   -JF     [place] Level (Gait) contact guard;1 person assist (P)   -JF     Assistive Device (Gait) walker, front-wheeled (P)   -JF     Distance in Feet (Gait) 40 (P)   x2  -JF     Pattern (Gait) 4-point;step-through (P)   -JF     Deviations/Abnormal Patterns (Gait) base of support, narrow;festinating/shuffling;gait speed decreased (P)   -JF     Bilateral Gait Deviations forward flexed posture (P)   -     Row Name 05/23/23 1200          Safety Issues, Functional Mobility    Impairments Affecting Function (Mobility) balance;endurance/activity tolerance;strength (P)   -     Row Name 05/23/23 1200          Balance    Balance Assessment standing dynamic balance (P)   -     Dynamic Standing Balance contact guard (P)   -     Position/Device Used, Standing Balance supported;walker, front-wheeled (P)   -     Row Name 05/23/23 1200          Progress Summary (PT)    Progress Toward Functional Goals (PT) progress toward functional goals is good (P)   -JF     Daily Progress Summary (PT) Patient tolerated ambulating in her room with a rolling walker with minimal difficulty. She will continue to benefit from physical therapy services while in the hospital. (P)   -           User Key  (r) = Recorded By, (t) = Taken By, (c) = Cosigned By    Initials Name Provider Type    Simon Marie, PT Student PT Student                  PT Recommendation and Plan  Anticipated Discharge Disposition (PT): home with home health  Planned Therapy Interventions (PT): balance training, bed mobility training, gait training, strengthening, transfer training  Therapy Frequency (PT): daily  Progress Summary (PT)  Progress Toward Functional Goals (PT): (P) progress toward functional goals is good  Daily Progress Summary (PT): (P) Patient tolerated ambulating in her room with a rolling walker with minimal difficulty. She will continue to  benefit from physical therapy services while in the hospital.  Plan of Care Reviewed With: patient  Outcome Evaluation: Patient presents with limitations including balance, strength, endurance, and ambulation. She will benefit from physical therapy services while in the hospital. Upon discharge, it is recommended she returns home with her son and grandsons and follows up with home health therapy services.   Outcome Measures     Row Name 05/23/23 1200 05/22/23 1100          How much help from another person do you currently need...    Turning from your back to your side while in flat bed without using bedrails? 4 (P)   -JF 4  -DP (r) JF (t) DP (c)     Moving from lying on back to sitting on the side of a flat bed without bedrails? 3 (P)   -JF 3  -DP (r) JF (t) DP (c)     Moving to and from a bed to a chair (including a wheelchair)? 3 (P)   -JF 3  -DP (r) JF (t) DP (c)     Standing up from a chair using your arms (e.g., wheelchair, bedside chair)? 3 (P)   -JF 3  -DP (r) JF (t) DP (c)     Climbing 3-5 steps with a railing? 2 (P)   -JF 2  -DP (r) JF (t) DP (c)     To walk in hospital room? 3 (P)   -JF 3  -DP (r) JF (t) DP (c)     AM-PAC 6 Clicks Score (PT) 18 (P)   -JF 18  -DP (r) JF (t)        Functional Assessment    Outcome Measure Options AM-PAC 6 Clicks Basic Mobility (PT) (P)   -JF AM-PAC 6 Clicks Basic Mobility (PT)  -DP (r) JF (t) DP (c)           User Key  (r) = Recorded By, (t) = Taken By, (c) = Cosigned By    Initials Name Provider Type    DP Handy Gabriel, PT Physical Therapist    JF Simon Esparza, PT Student PT Student                 Time Calculation:    PT Charges     Row Name 05/23/23 1209             Time Calculation    PT Received On 05/23/23 (P)   -JF         Timed Charges    80450 - Gait Training Minutes  10 (P)   -JF      76864 - PT Therapeutic Activity Minutes 5 (P)   -JF         Total Minutes    Timed Charges Total Minutes 15 (P)   -JF       Total Minutes 15 (P)   -JF            User Key  (r)  = Recorded By, (t) = Taken By, (c) = Cosigned By    Initials Name Provider Type    JF Simon Esparza, PT Student PT Student              Therapy Charges for Today     Code Description Service Date Service Provider Modifiers Qty    91130501380 HC PT EVAL LOW COMPLEXITY 3 5/22/2023 Simon Esparza, PT Student GP 1    19509915319 HC GAIT TRAINING EA 15 MIN 5/23/2023 Simon Esparza, PT Student GP 1          PT G-Codes  Outcome Measure Options: (P) AM-PAC 6 Clicks Basic Mobility (PT)  AM-PAC 6 Clicks Score (PT): (P) 18  AM-PAC 6 Clicks Score (OT): 16    Simon Esparza PT Student  5/23/2023

## 2023-05-24 LAB
ALBUMIN SERPL-MCNC: 3.3 G/DL (ref 3.5–5.2)
ALBUMIN/GLOB SERPL: 1.4 G/DL
ALP SERPL-CCNC: 71 U/L (ref 39–117)
ALT SERPL W P-5'-P-CCNC: 12 U/L (ref 1–33)
ANION GAP SERPL CALCULATED.3IONS-SCNC: 7.3 MMOL/L (ref 5–15)
AST SERPL-CCNC: 28 U/L (ref 1–32)
BASOPHILS # BLD AUTO: 0.02 10*3/MM3 (ref 0–0.2)
BASOPHILS NFR BLD AUTO: 0.5 % (ref 0–1.5)
BILIRUB SERPL-MCNC: 0.3 MG/DL (ref 0–1.2)
BUN SERPL-MCNC: 22 MG/DL (ref 8–23)
BUN/CREAT SERPL: 28.9 (ref 7–25)
CALCIUM SPEC-SCNC: 8.5 MG/DL (ref 8.6–10.5)
CHLORIDE SERPL-SCNC: 108 MMOL/L (ref 98–107)
CO2 SERPL-SCNC: 24.7 MMOL/L (ref 22–29)
CREAT SERPL-MCNC: 0.76 MG/DL (ref 0.57–1)
CRP SERPL-MCNC: 3.11 MG/DL (ref 0–0.5)
DEPRECATED RDW RBC AUTO: 45.4 FL (ref 37–54)
EGFRCR SERPLBLD CKD-EPI 2021: 81.3 ML/MIN/1.73
EOSINOPHIL # BLD AUTO: 0.12 10*3/MM3 (ref 0–0.4)
EOSINOPHIL NFR BLD AUTO: 3.1 % (ref 0.3–6.2)
ERYTHROCYTE [DISTWIDTH] IN BLOOD BY AUTOMATED COUNT: 13.5 % (ref 12.3–15.4)
GLOBULIN UR ELPH-MCNC: 2.4 GM/DL
GLUCOSE SERPL-MCNC: 112 MG/DL (ref 65–99)
HCT VFR BLD AUTO: 34.7 % (ref 34–46.6)
HGB BLD-MCNC: 11.2 G/DL (ref 12–15.9)
IMM GRANULOCYTES # BLD AUTO: 0.01 10*3/MM3 (ref 0–0.05)
IMM GRANULOCYTES NFR BLD AUTO: 0.3 % (ref 0–0.5)
LYMPHOCYTES # BLD AUTO: 0.8 10*3/MM3 (ref 0.7–3.1)
LYMPHOCYTES NFR BLD AUTO: 20.8 % (ref 19.6–45.3)
MCH RBC QN AUTO: 29.6 PG (ref 26.6–33)
MCHC RBC AUTO-ENTMCNC: 32.3 G/DL (ref 31.5–35.7)
MCV RBC AUTO: 91.6 FL (ref 79–97)
MONOCYTES # BLD AUTO: 0.53 10*3/MM3 (ref 0.1–0.9)
MONOCYTES NFR BLD AUTO: 13.8 % (ref 5–12)
NEUTROPHILS NFR BLD AUTO: 2.36 10*3/MM3 (ref 1.7–7)
NEUTROPHILS NFR BLD AUTO: 61.5 % (ref 42.7–76)
NRBC BLD AUTO-RTO: 0 /100 WBC (ref 0–0.2)
PLATELET # BLD AUTO: 179 10*3/MM3 (ref 140–450)
PMV BLD AUTO: 10.2 FL (ref 6–12)
POTASSIUM SERPL-SCNC: 4.3 MMOL/L (ref 3.5–5.2)
PROT SERPL-MCNC: 5.7 G/DL (ref 6–8.5)
RBC # BLD AUTO: 3.79 10*6/MM3 (ref 3.77–5.28)
SODIUM SERPL-SCNC: 140 MMOL/L (ref 136–145)
WBC NRBC COR # BLD: 3.84 10*3/MM3 (ref 3.4–10.8)

## 2023-05-24 PROCEDURE — 25010000002 ENOXAPARIN PER 10 MG: Performed by: INTERNAL MEDICINE

## 2023-05-24 PROCEDURE — 25010000002 ONDANSETRON PER 1 MG: Performed by: INTERNAL MEDICINE

## 2023-05-24 PROCEDURE — 80053 COMPREHEN METABOLIC PANEL: CPT | Performed by: INTERNAL MEDICINE

## 2023-05-24 PROCEDURE — 85025 COMPLETE CBC W/AUTO DIFF WBC: CPT | Performed by: INTERNAL MEDICINE

## 2023-05-24 PROCEDURE — 97110 THERAPEUTIC EXERCISES: CPT

## 2023-05-24 PROCEDURE — 86140 C-REACTIVE PROTEIN: CPT | Performed by: INTERNAL MEDICINE

## 2023-05-24 RX ORDER — ONDANSETRON 2 MG/ML
4 INJECTION INTRAMUSCULAR; INTRAVENOUS EVERY 6 HOURS PRN
Status: DISCONTINUED | OUTPATIENT
Start: 2023-05-24 | End: 2023-05-26 | Stop reason: HOSPADM

## 2023-05-24 RX ADMIN — GABAPENTIN 600 MG: 300 CAPSULE ORAL at 06:14

## 2023-05-24 RX ADMIN — HYDROCODONE BITARTRATE AND ACETAMINOPHEN 1 TABLET: 5; 325 TABLET ORAL at 20:38

## 2023-05-24 RX ADMIN — GABAPENTIN 600 MG: 300 CAPSULE ORAL at 14:11

## 2023-05-24 RX ADMIN — LISINOPRIL 10 MG: 10 TABLET ORAL at 08:17

## 2023-05-24 RX ADMIN — DOCUSATE SODIUM 50MG AND SENNOSIDES 8.6MG 2 TABLET: 8.6; 5 TABLET, FILM COATED ORAL at 20:38

## 2023-05-24 RX ADMIN — ENOXAPARIN SODIUM 30 MG: 100 INJECTION SUBCUTANEOUS at 14:11

## 2023-05-24 RX ADMIN — ONDANSETRON 4 MG: 2 INJECTION INTRAMUSCULAR; INTRAVENOUS at 04:43

## 2023-05-24 RX ADMIN — AZITHROMYCIN MONOHYDRATE 500 MG: 250 TABLET ORAL at 08:17

## 2023-05-24 RX ADMIN — DOCUSATE SODIUM 50MG AND SENNOSIDES 8.6MG 2 TABLET: 8.6; 5 TABLET, FILM COATED ORAL at 08:17

## 2023-05-24 RX ADMIN — GABAPENTIN 600 MG: 300 CAPSULE ORAL at 23:05

## 2023-05-24 NOTE — PLAN OF CARE
Goal Outcome Evaluation:              Outcome Evaluation: pt resting in bed atthis time, pt c/o pain and nausea this shift,, pt alert and oriented, pt on room air, pt continues to require assist to BSC, bed alert on and call light within reach.

## 2023-05-24 NOTE — THERAPY TREATMENT NOTE
Acute Care - Physical Therapy Treatment Note  [facility]     Patient Name: Devi Bobo  : 1945  MRN: [id number]  Today's Date: 2023      Visit Dx:     ICD-10-CM ICD-9-CM   1. Weakness  R53.1 780.79   2. Decreased activities of daily living (ADL)  Z78.9 V49.89   3. Difficulty in walking  R26.2 719.7     Patient Active Problem List   Diagnosis   • Bronchitis   • Cataract   • Chronic pelvic pain in female   • Degeneration of lumbar intervertebral disc   • Encounter for long-term (current) use of insulin   • Frequency of micturition   • Herniation of intervertebral disc   • High blood pressure   • HTN (hypertension)   • Lumbar spondylosis   • Nocturia   • Osteoporosis   • Pain, generalized   • Urge incontinence   • Bladder disorder   • Female bladder prolapse   • Urinary incontinence   • Urinary urgency   • Piriformis syndrome of both sides   • Vaginal atrophy   • Atypical squamous cells cannot exclude high grade squamous intraepithelial lesion on cytologic smear of cervix (ASC-H)   • High grade squamous intraepithelial lesion (HGSIL) on cytologic smear of cervix   • Sciatic nerve lesion   • Osteoarthritis   • Lumbosacral spondylosis without myelopathy   • Chronic vertigo   • Chronic fatigue syndrome   • Cervical spondylosis without myelopathy   • Abnormal gait   • Dysplasia of cervix, low grade (CASH 1)   • Postoperative visit   • Postmenopausal osteoporosis   • Sepsis, due to unspecified organism, unspecified whether acute organ dysfunction present   • Severe Malnutrition (HCC)   • Weakness   • COVID-19   • Pharyngitis   • Severe malnutrition     Past Medical History:   Diagnosis Date   • Age-related osteoporosis without current pathological fracture    • Allergy     SULFA DRUGS   MILD   • Atypical squamous cells cannot exclude high grade squamous intraepithelial lesion on cytologic smear of cervix (ASC-H)    • 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)    •  Hypertension    • Lesion of sciatic nerve, bilateral lower limbs    • Osteoporosis    • Overactive bladder    • Piriformis syndrome    • Polyp of colon    • Postmenopausal atrophic vaginitis    • Vaginal atrophy      Past Surgical History:   Procedure Laterality Date   • COLONOSCOPY  2019   • LEEP N/A 1/6/2022    Procedure: LOOP ELECTROCAUTERY EXCISION PROCEDURE;  Surgeon: El Álvarez MD;  Location: [facility] MAIN OR;  Service: Gynecology;  Laterality: N/A;   • TUBAL ABDOMINAL LIGATION       PT Assessment (last 12 hours)     PT Evaluation and Treatment     Row Name 05/24/23 1446          Physical Therapy Time and Intention    Subjective Information no complaints (P)   -     Document Type therapy note (daily note) (P)   -JF     Mode of Treatment individual therapy;physical therapy (P)   -     Patient Effort good (P)   -     Symptoms Noted During/After Treatment none (P)   -     Row Name 05/24/23 1446          General Information    Patient Profile Reviewed yes (P)   -     Patient Observations alert;cooperative;agree to therapy (P)   -     Equipment Currently Used at Home none (P)   -     Existing Precautions/Restrictions fall (P)   -     Barriers to Rehab none identified (P)   -     Row Name 05/24/23 1446          Bed Mobility    Bed Mobility scooting/bridging;supine-sit-supine (P)   -     Scooting/Bridging [place] (Bed Mobility) standby assist (P)   -     Supine-Sit-Supine [place] (Bed Mobility) standby assist (P)   -     Row Name 05/24/23 1446          Transfers    Transfers sit-stand transfer (P)   -     Row Name 05/24/23 1446          Sit-Stand Transfer    Sit-Stand [place] (Transfers) contact guard (P)   -     Assistive Device (Sit-Stand Transfers) walker, front-wheeled (P)   -     Row Name 05/24/23 1446          Gait/Stairs (Locomotion)    Gait/Stairs Locomotion gait/ambulation assistive device (P)   -     [place] Level (Gait) contact guard (P)   -      Assistive Device (Gait) walker, front-wheeled (P)   -     Distance in Feet (Gait) 45 (P)   -     Pattern (Gait) 4-point;step-through (P)   -     Deviations/Abnormal Patterns (Gait) base of support, narrow;festinating/shuffling;gait speed decreased (P)   -     Bilateral Gait Deviations forward flexed posture (P)   -     Row Name 05/24/23 1446          Safety Issues, Functional Mobility    Impairments Affecting Function (Mobility) balance;endurance/activity tolerance;strength (P)   -     Row Name 05/24/23 1446          Balance    Balance Assessment standing dynamic balance (P)   -     Dynamic Standing Balance contact guard (P)   -     Position/Device Used, Standing Balance supported;walker, front-wheeled (P)   -     Row Name 05/24/23 1446          Motor Skills    Therapeutic Exercise hip;knee;ankle (P)   -[facility] Name 05/24/23 1446          Hip (Therapeutic Exercise)    Hip (Therapeutic Exercise) AROM (active range of motion);strengthening exercise (P)   -     Hip AROM (Therapeutic Exercise) bilateral;aBduction;aDduction;sitting;20 repititions (P)   -     Hip Strengthening (Therapeutic Exercise) bilateral;marching while seated;20 repititions (P)   -     Row Name 05/24/23 1446          Knee (Therapeutic Exercise)    Knee (Therapeutic Exercise) AROM (active range of motion) (P)   -     Knee AROM (Therapeutic Exercise) bilateral;LAQ (long arc quad);sitting;20 repititions (P)   -     Row Name 05/24/23 1446          Ankle (Therapeutic Exercise)    Ankle (Therapeutic Exercise) AROM (active range of motion) (P)   -     Ankle AROM (Therapeutic Exercise) bilateral;dorsiflexion;plantarflexion;sitting;20 repititions (P)   -     Row Name 05/24/23 1446          Progress Summary (PT)    Progress Toward Functional Goals (PT) progress toward functional goals is good (P)   -     Daily Progress Summary (PT) Patient tolerated ambulating in her room with a rolling walker and performing therapeutic  exercise today. Patient noted her legs feeling weak. She will continue to benefit from physical therapy services while in the hospital. (P)   -JF           User Key  (r) = Recorded By, (t) = Taken By, (c) = Cosigned By    Initials Name Provider Type    Simon Marie, PT Student PT Student                  PT Recommendation and Plan  Anticipated Discharge Disposition (PT): home with home health  Planned Therapy Interventions (PT): balance training, bed mobility training, gait training, strengthening, transfer training  Therapy Frequency (PT): daily  Progress Summary (PT)  Progress Toward Functional Goals (PT): (P) progress toward functional goals is good  Daily Progress Summary (PT): (P) Patient tolerated ambulating in her room with a rolling walker and performing therapeutic exercise today. Patient noted her legs feeling weak. She will continue to benefit from physical therapy services while in the hospital.  Plan of Care Reviewed With: patient  Outcome Evaluation: Patient presents with limitations including balance, strength, endurance, and ambulation. She will benefit from physical therapy services while in the hospital. Upon discharge, it is recommended she returns home with her son and grandsons and follows up with home health therapy services.   Outcome Measures     Row Name 05/24/23 1400 05/23/23 1200 05/22/23 1100       How much help from another person do you currently need...    Turning from your back to your side while in flat bed without using bedrails? 4 (P)   -JF 4  -DP (r) JF (t) DP (c) 4  -DP (r) JF (t) DP (c)    Moving from lying on back to sitting on the side of a flat bed without bedrails? 3 (P)   - 3  -DP (r) JF (t) DP (c) 3  -DP (r) JF (t) DP (c)    Moving to and from a bed to a chair (including a wheelchair)? 3 (P)   -JF 3  -DP (r) JF (t) DP (c) 3  -DP (r) JF (t) DP (c)    Standing up from a chair using your arms (e.g., wheelchair, bedside chair)? 3 (P)   -JF 3  -DP (r) JF (t) DP (c) 3   -DP (r) JF (t) DP (c)    Climbing 3-5 steps with a railing? 2 (P)   -JF 2  -DP (r) JF (t) DP (c) 2  -DP (r) JF (t) DP (c)    To walk in hospital room? 3 (P)   -JF 3  -DP (r) JF (t) DP (c) 3  -DP (r) JF (t) DP (c)    AM-PAC 6 Clicks Score (PT) 18 (P)   -JF 18  -DP (r) JF (t) 18  -DP (r) JF (t)       Functional Assessment    Outcome Measure Options AM-PAC 6 Clicks Basic Mobility (PT) (P)   -JF AM-PAC 6 Clicks Basic Mobility (PT)  -DP (r) JF (t) DP (c) AM-PAC 6 Clicks Basic Mobility (PT)  -DP (r) JF (t) DP (c)          User Key  (r) = Recorded By, (t) = Taken By, (c) = Cosigned By    Initials Name Provider Type    Handy Jules, PT Physical Therapist    Simon Marie, PT Student PT Student                 Time Calculation:    PT Charges     Row Name 05/24/23 1451             Time Calculation    PT Received On 05/24/23 (P)   -JF         Timed Charges    73832 - PT Therapeutic Exercise Minutes 8 (P)   -JF      80008 - Gait Training Minutes  8 (P)   -JF         Total Minutes    Timed Charges Total Minutes 16 (P)   -JF       Total Minutes 16 (P)   -JF            User Key  (r) = Recorded By, (t) = Taken By, (c) = Cosigned By    Initials Name Provider Type    Simon Marie, PT Student PT Student              Therapy Charges for Today     Code Description Service Date Service Provider Modifiers Qty    99601689862 HC GAIT TRAINING EA 15 MIN 5/23/2023 Simon Esparza, PT Student GP 1    00996587431 HC PT THER PROC EA 15 MIN 5/24/2023 Simon Esparza, PT Student GP 1          PT G-Codes  Outcome Measure Options: (P) AM-PAC 6 Clicks Basic Mobility (PT)  AM-PAC 6 Clicks Score (PT): (P) 18  AM-PAC 6 Clicks Score (OT): 16    Simon Esparza PT Student  5/24/2023

## 2023-05-24 NOTE — PLAN OF CARE
Goal Outcome Evaluation:              Outcome Evaluation: Pt vss. Pt resting well. Pt covid pos. No complaints of pain. Continue plan of care.

## 2023-05-24 NOTE — THERAPY TREATMENT NOTE
Patient Name: Devi Bobo  : 1945    MRN: [id number]                              Today's Date: 2023       Admit Date: 2023    Visit Dx:     ICD-10-CM ICD-9-CM   1. Weakness  R53.1 780.79   2. Decreased activities of daily living (ADL)  Z78.9 V49.89   3. Difficulty in walking  R26.2 719.7     Patient Active Problem List   Diagnosis   • Bronchitis   • Cataract   • Chronic pelvic pain in female   • Degeneration of lumbar intervertebral disc   • Encounter for long-term (current) use of insulin   • Frequency of micturition   • Herniation of intervertebral disc   • High blood pressure   • HTN (hypertension)   • Lumbar spondylosis   • Nocturia   • Osteoporosis   • Pain, generalized   • Urge incontinence   • Bladder disorder   • Female bladder prolapse   • Urinary incontinence   • Urinary urgency   • Piriformis syndrome of both sides   • Vaginal atrophy   • Atypical squamous cells cannot exclude high grade squamous intraepithelial lesion on cytologic smear of cervix (ASC-H)   • High grade squamous intraepithelial lesion (HGSIL) on cytologic smear of cervix   • Sciatic nerve lesion   • Osteoarthritis   • Lumbosacral spondylosis without myelopathy   • Chronic vertigo   • Chronic fatigue syndrome   • Cervical spondylosis without myelopathy   • Abnormal gait   • Dysplasia of cervix, low grade (CASH 1)   • Postoperative visit   • Postmenopausal osteoporosis   • Sepsis, due to unspecified organism, unspecified whether acute organ dysfunction present   • Severe Malnutrition (HCC)   • Weakness   • COVID-19   • Pharyngitis   • Severe malnutrition     Past Medical History:   Diagnosis Date   • Age-related osteoporosis without current pathological fracture    • Allergy     SULFA DRUGS   MILD   • Atypical squamous cells cannot exclude high grade squamous intraepithelial lesion on cytologic smear of cervix (ASC-H)    • 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)    •  Hypertension    • Lesion of sciatic nerve, bilateral lower limbs    • Osteoporosis    • Overactive bladder    • Piriformis syndrome    • Polyp of colon    • Postmenopausal atrophic vaginitis    • Vaginal atrophy      Past Surgical History:   Procedure Laterality Date   • COLONOSCOPY  2019   • LEEP N/A 1/6/2022    Procedure: LOOP ELECTROCAUTERY EXCISION PROCEDURE;  Surgeon: El Álvarez MD;  Location: [facility] MAIN OR;  Service: Gynecology;  Laterality: N/A;   • TUBAL ABDOMINAL LIGATION        General Information     Row Name 05/24/23 1227          OT Time and Intention    Document Type therapy note (daily note)  -PG     Mode of Treatment individual therapy;occupational therapy  -PG           User Key  (r) = Recorded By, (t) = Taken By, (c) = Cosigned By    Initials Name Provider Type    PG Domenico Charles, KENNETH Occupational Therapist                 Mobility/ADL's    No documentation.                Obj/Interventions     Row Name 05/24/23 1227          Shoulder (Therapeutic Exercise)    Shoulder (Therapeutic Exercise) strengthening exercise  -PG     Shoulder Strengthening (Therapeutic Exercise) 1 lb free weight;20 repititions  -PG     Row Name 05/24/23 1227          Elbow/Forearm (Therapeutic Exercise)    Elbow/Forearm (Therapeutic Exercise) strengthening exercise  -PG     Elbow/Forearm Strengthening (Therapeutic Exercise) 20 repititions;1 lb free weight  -PG     Row Name 05/24/23 1227          Motor Skills    Therapeutic Exercise shoulder;elbow/forearm  -PG           User Key  (r) = Recorded By, (t) = Taken By, (c) = Cosigned By    Initials Name Provider Type    PG Domenico Charles, OT Occupational Therapist               Goals/Plan    No documentation.                Clinical Impression     Row Name 05/24/23 1228          Plan of Care Review    Plan of Care Reviewed With patient  -PG     Progress no change  -PG           User Key  (r) = Recorded By, (t) = Taken By, (c) = Cosigned By    Initials Name Provider Type     Domenico Balderrama, OT Occupational Therapist               Outcome Measures     Row Name 05/24/23 1229          How much help from another is currently needed...    Putting on and taking off regular lower body clothing? 2  -PG     Bathing (including washing, rinsing, and drying) 2  -PG     Toileting (which includes using toilet bed pan or urinal) 2  -PG     Putting on and taking off regular upper body clothing 3  -PG     Taking care of personal grooming (such as brushing teeth) 3  -PG     Eating meals 4  -PG     AM-PAC 6 Clicks Score (OT) 16  -PG     Row Name 05/24/23 0817 05/24/23 0115       How much help from another person do you currently need...    Turning from your back to your side while in flat bed without using bedrails? 4  -MG 4  -PS    Moving from lying on back to sitting on the side of a flat bed without bedrails? 3  -MG 3  -PS    Moving to and from a bed to a chair (including a wheelchair)? 3  -MG 3  -PS    Standing up from a chair using your arms (e.g., wheelchair, bedside chair)? 3  -MG 3  -PS    Climbing 3-5 steps with a railing? 2  -MG 2  -PS    To walk in hospital room? 3  -MG 3  -PS    AM-PAC 6 Clicks Score (PT) 18  -MG 18  -PS    Highest level of mobility 6 --> Walked 10 steps or more  -MG 6 --> Walked 10 steps or more  -PS    Row Name 05/24/23 1229          Functional Assessment    Outcome Measure Options AM-PAC 6 Clicks Daily Activity (OT);Optimal Instrument  -PG     Row Name 05/24/23 1229          Optimal Instrument    Optimal Instrument Optimal - 3  -PG     Bending/Stooping 3  -PG     Standing 2  -PG     Reaching 1  -PG           User Key  (r) = Recorded By, (t) = Taken By, (c) = Cosigned By    Initials Name Provider Type    Georgie Recio RN Registered Nurse    Domenico Balderrama, KENNETH Occupational Therapist    Clara Hercules RN Registered Nurse                Occupational Therapy Education     Title: PT OT SLP Therapies (Done)     Topic: Occupational Therapy (Done)     Point: ADL  training (Done)     Description:   Instruct learner(s) on proper safety adaptation and remediation techniques during self care or transfers.   Instruct in proper use of assistive devices.              Learning Progress Summary           Patient Acceptance, E,D, DU by PG at 5/22/2023 1018                   Point: Home exercise program (Done)     Description:   Instruct learner(s) on appropriate technique for monitoring, assisting and/or progressing therapeutic exercises/activities.              Learning Progress Summary           Patient Acceptance, E,D, DU by PG at 5/22/2023 1018                   Point: Precautions (Done)     Description:   Instruct learner(s) on prescribed precautions during self-care and functional transfers.              Learning Progress Summary           Patient Acceptance, E,D, DU by PG at 5/22/2023 1018                   Point: Body mechanics (Done)     Description:   Instruct learner(s) on proper positioning and spine alignment during self-care, functional mobility activities and/or exercises.              Learning Progress Summary           Patient Acceptance, E,D, DU by PG at 5/22/2023 1018                               User Key     Initials Effective Dates Name Provider Type Discipline    PG 06/16/21 -  Domenico Charles OT Occupational Therapist OT              OT Recommendation and Plan  Planned Therapy Interventions (OT): activity tolerance training, strengthening exercise, transfer/mobility retraining, patient/caregiver education/training, occupation/activity based interventions  Therapy Frequency (OT): 5 times/wk  Plan of Care Review  Plan of Care Reviewed With: patient  Progress: no change  Outcome Evaluation: Patient presents with limitations affecting strength, activity tolerance, and balance impacting patient's ability to return home safely and independently.  The skills of a therapist will be required to safely and effectively implement the following treatment plan to restore  maximal level of function     Time Calculation:    Time Calculation- OT     Row Name 05/24/23 1230             Time Calculation- OT    OT Received On 05/24/23  -PG      OT Goal Re-Cert Due Date 05/31/23  -PG         Timed Charges    03861 - OT Therapeutic Exercise Minutes 10  -PG         Total Minutes    Timed Charges Total Minutes 10  -PG       Total Minutes 10  -PG            User Key  (r) = Recorded By, (t) = Taken By, (c) = Cosigned By    Initials Name Provider Type    PG Domenico Charles OT Occupational Therapist              Therapy Charges for Today     Code Description Service Date Service Provider Modifiers Qty    36202402556  OT THER PROC EA 15 MIN 5/24/2023 Domenico Charles OT GO 1               Domenico Charles OT  5/24/2023

## 2023-05-24 NOTE — PROGRESS NOTES
[facility]     Progress Note    Patient Name: Devi Bobo  : 1945  MRN: [id number]  Primary Care Physician:  Raad Del Toro MD  Date of admission: 2023      Subjective   Brief summary.  Patient admitted with weakness.  Further work-up showed COVID-positive test.      HPI:  Follow-up on weakness and COVID.  Patient feeling better.  Still feels very weak, difficulty with mobility.  Bowels moving after new meds added.  No abdominal pain.  Complains of some nausea.    Review of Systems     No fever chill  Decreased appetite, no abdominal pain, nausea  Sore throat better  Fatigue and weakness      Objective     Vitals:   Temp:  [98 °F (36.7 °C)-98.6 °F (37 °C)] 98.2 °F (36.8 °C)  Heart Rate:  [81-97] 82  Resp:  [16] 16  BP: (105-143)/(60-71) 110/68    Physical Exam :     Elderly female not in acute distress.  Pharyngeal redness improved  Heart regular.  Lungs diminished breath sound but clear bilaterally.  Abdomen soft nontender, no masses  Neurologically awake alert and oriented  Extremities no edema    Result Review:  I have personally reviewed the results from the time of this admission to 2023 08:27 EDT and agree with these findings:  []  Laboratory  []  Microbiology  []  Radiology  []  EKG/Telemetry   []  Cardiology/Vascular   []  Pathology  []  Old records  []  Other:           Assessment / Plan       Active Hospital Problems:  Active Hospital Problems    Diagnosis    • **Weakness    • Severe malnutrition    • Pharyngitis    • COVID-19        Plan:   Continue remdesivir,   On Zithromax  Overall improved  Increase activity  Discussed with patient about inpatient rehab/nursing home.  Patient reluctant  But also reports that she is too weak to go home  Social service consult  Ready for discharge in 1 to 2 days       DVT prophylaxis:  Medical DVT prophylaxis orders are present.    CODE STATUS:   Level Of Support Discussed With: Patient  Code Status (Patient has no pulse and is not  breathing): CPR (Attempt to Resuscitate)  Medical Interventions (Patient has pulse or is breathing): Full Support  Release to patient: Routine Release                Electronically signed by Lamont Prince MD, 05/24/23, 8:29 AM EDT.

## 2023-05-24 NOTE — SIGNIFICANT NOTE
05/24/23 1547   Plan   Plan SW discussed with son regarding bed offers. Son has accepted [facility] and pt is agreeable. Bed will be available tomorrow 5/25.

## 2023-05-25 LAB
BACTERIA SPEC AEROBE CULT: NORMAL
BACTERIA SPEC AEROBE CULT: NORMAL

## 2023-05-25 PROCEDURE — 97116 GAIT TRAINING THERAPY: CPT

## 2023-05-25 PROCEDURE — 25010000002 ENOXAPARIN PER 10 MG: Performed by: INTERNAL MEDICINE

## 2023-05-25 RX ADMIN — ENOXAPARIN SODIUM 30 MG: 100 INJECTION SUBCUTANEOUS at 14:34

## 2023-05-25 RX ADMIN — GABAPENTIN 600 MG: 300 CAPSULE ORAL at 14:34

## 2023-05-25 RX ADMIN — GABAPENTIN 600 MG: 300 CAPSULE ORAL at 06:26

## 2023-05-25 RX ADMIN — LISINOPRIL 10 MG: 10 TABLET ORAL at 08:37

## 2023-05-25 RX ADMIN — GABAPENTIN 600 MG: 300 CAPSULE ORAL at 22:08

## 2023-05-25 RX ADMIN — HYDROCODONE BITARTRATE AND ACETAMINOPHEN 1 TABLET: 5; 325 TABLET ORAL at 14:34

## 2023-05-25 RX ADMIN — DOCUSATE SODIUM 50MG AND SENNOSIDES 8.6MG 2 TABLET: 8.6; 5 TABLET, FILM COATED ORAL at 08:38

## 2023-05-25 NOTE — PROGRESS NOTES
[facility]   Progress Note    Patient Name: Devi Bobo  : 1945  MRN: [id number]  Primary Care Physician: Raad Del Toro MD  Date of admission: 2023    Subjective   Subjective     Chief Complaint:     Follow-up on weakness and COVID-19  History of Present Illness  Still feels weak but is gradually regaining strength  Able to ambulate inside the room with a walker      Review of Systems   Constitutional: Positive for activity change.       Objective   Objective     Vitals:  Temp:  [97.6 °F (36.4 °C)-98.3 °F (36.8 °C)] 98.3 °F (36.8 °C)  Heart Rate:  [] 103  Resp:  [16-18] 16  BP: (120-141)/(69-75) 125/73    Physical Exam  Constitutional:       Appearance: Normal appearance.   Cardiovascular:      Rate and Rhythm: Normal rate.   Skin:     General: Skin is warm.   Neurological:      General: No focal deficit present.      Mental Status: She is alert.         Result Review    Result Review:  I have personally reviewed the results from the time of this admission to 23 6:57 PM EDT and agree with these findings:  []  Laboratory  []  Microbiology  []  Radiology  []  EKG/Telemetry   []  Cardiology/Vascular   []  Pathology  []  Old records  []  Other:    Assessment & Plan   Assessment / Plan       Active Hospital Problems:  Active Hospital Problems    Diagnosis    • **Weakness    • Severe malnutrition    • Pharyngitis    • COVID-19        Plan:   Continue current management  Transfer to [facility] rehab in the morning                  Electronically signed by Ty Barroso MD, 23, 6:57 PM EDT.

## 2023-05-25 NOTE — SIGNIFICANT NOTE
05/25/23 0857   Plan   Final Discharge Disposition Code 62 - inpatient rehab facility   Final Note Pt has a bed at [facility] today.   ** [facility] pt does not have a bed available today. Bed suppose to be available tomorrow 5/26.

## 2023-05-25 NOTE — THERAPY TREATMENT NOTE
Acute Care - Physical Therapy Treatment Note  MAL Fernández     Patient Name: Devi Bobo  : 1945  MRN: [id number]  Today's Date: 2023      Visit Dx:     ICD-10-CM ICD-9-CM   1. Weakness  R53.1 780.79   2. Decreased activities of daily living (ADL)  Z78.9 V49.89   3. Difficulty in walking  R26.2 719.7     Patient Active Problem List   Diagnosis   • Bronchitis   • Cataract   • Chronic pelvic pain in female   • Degeneration of lumbar intervertebral disc   • Encounter for long-term (current) use of insulin   • Frequency of micturition   • Herniation of intervertebral disc   • High blood pressure   • HTN (hypertension)   • Lumbar spondylosis   • Nocturia   • Osteoporosis   • Pain, generalized   • Urge incontinence   • Bladder disorder   • Female bladder prolapse   • Urinary incontinence   • Urinary urgency   • Piriformis syndrome of both sides   • Vaginal atrophy   • Atypical squamous cells cannot exclude high grade squamous intraepithelial lesion on cytologic smear of cervix (ASC-H)   • High grade squamous intraepithelial lesion (HGSIL) on cytologic smear of cervix   • Sciatic nerve lesion   • Osteoarthritis   • Lumbosacral spondylosis without myelopathy   • Chronic vertigo   • Chronic fatigue syndrome   • Cervical spondylosis without myelopathy   • Abnormal gait   • Dysplasia of cervix, low grade (CASH 1)   • Postoperative visit   • Postmenopausal osteoporosis   • Sepsis, due to unspecified organism, unspecified whether acute organ dysfunction present   • Severe Malnutrition (HCC)   • Weakness   • COVID-19   • Pharyngitis   • Severe malnutrition     Past Medical History:   Diagnosis Date   • Age-related osteoporosis without current pathological fracture    • Allergy     SULFA DRUGS   MILD   • Atypical squamous cells cannot exclude high grade squamous intraepithelial lesion on cytologic smear of cervix (ASC-H)    • 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)    •  Hypertension    • Lesion of sciatic nerve, bilateral lower limbs    • Osteoporosis    • Overactive bladder    • Piriformis syndrome    • Polyp of colon    • Postmenopausal atrophic vaginitis    • Vaginal atrophy      Past Surgical History:   Procedure Laterality Date   • COLONOSCOPY  2019   • LEEP N/A 1/6/2022    Procedure: LOOP ELECTROCAUTERY EXCISION PROCEDURE;  Surgeon: El Álvarez MD;  Location: [facility] MAIN OR;  Service: Gynecology;  Laterality: N/A;   • TUBAL ABDOMINAL LIGATION       PT Assessment (last 12 hours)     PT Evaluation and Treatment     Row Name 05/25/23 1414          Physical Therapy Time and Intention    Subjective Information no complaints (P)   -     Document Type therapy note (daily note) (P)   -JF     Mode of Treatment individual therapy;physical therapy (P)   -     Patient Effort good (P)   -     Symptoms Noted During/After Treatment none (P)   -     Row Name 05/25/23 1414          General Information    Patient Profile Reviewed yes (P)   -JF     Patient Observations alert;cooperative;agree to therapy (P)   -     Equipment Currently Used at Home none (P)   -     Existing Precautions/Restrictions fall (P)   -     Barriers to Rehab none identified (P)   -     Row Name 05/25/23 1414          Bed Mobility    Bed Mobility supine-sit (P)   -     Supine-Sit [place] (Bed Mobility) standby assist (P)   -     Row Name 05/25/23 1414          Transfers    Transfers sit-stand transfer;toilet transfer (P)   -     Row Name 05/25/23 1414          Sit-Stand Transfer    Sit-Stand [place] (Transfers) contact guard (P)   -     Assistive Device (Sit-Stand Transfers) walker, front-wheeled (P)   -     Row Name 05/25/23 1414          Toilet Transfer    Type (Toilet Transfer) sit-stand;stand-sit (P)   -     [place] Level (Toilet Transfer) contact guard (P)   -     Assistive Device (Toilet Transfer) commode;walker, front-wheeled (P)   -     Row Name 05/25/23  1414          Gait/Stairs (Locomotion)    Gait/Stairs Locomotion gait/ambulation assistive device (P)   -JF     [place] Level (Gait) contact guard (P)   -JF     Assistive Device (Gait) walker, front-wheeled (P)   -JF     Distance in Feet (Gait) 50 (P)   -JF     Pattern (Gait) 4-point;step-through (P)   -JF     Deviations/Abnormal Patterns (Gait) base of support, narrow;festinating/shuffling;gait speed decreased (P)   -     Row Name 05/25/23 1414          Safety Issues, Functional Mobility    Impairments Affecting Function (Mobility) balance;endurance/activity tolerance;strength (P)   -     Row Name 05/25/23 1414          Balance    Balance Assessment standing dynamic balance (P)   -     Dynamic Standing Balance contact guard (P)   -     Position/Device Used, Standing Balance supported;walker, front-wheeled (P)   -     Row Name 05/25/23 1414          Positioning and Restraints    Pre-Treatment Position in bed (P)   -JF     Post Treatment Position chair (P)   -JF     In Chair notified nsg;sitting;call light within reach;encouraged to call for assist (P)   -     Row Name 05/25/23 1414          Progress Summary (PT)    Progress Toward Functional Goals (PT) progress toward functional goals is good (P)   -JF     Daily Progress Summary (PT) Patient tolerated ambulating in her room today with a rolling walker with minimal difficulty. She will continue to benefit from physical therapy services while in the hospital. (P)   -JF           User Key  (r) = Recorded By, (t) = Taken By, (c) = Cosigned By    Initials Name Provider Type    Simon Marie, PT Student PT Student                  PT Recommendation and Plan  Anticipated Discharge Disposition (PT): home with home health  Planned Therapy Interventions (PT): balance training, bed mobility training, gait training, strengthening, transfer training  Therapy Frequency (PT): daily  Progress Summary (PT)  Progress Toward Functional Goals (PT): (P) progress  toward functional goals is good  Daily Progress Summary (PT): (P) Patient tolerated ambulating in her room today with a rolling walker with minimal difficulty. She will continue to benefit from physical therapy services while in the hospital.  Plan of Care Reviewed With: patient  Outcome Evaluation: Patient presents with limitations including balance, strength, endurance, and ambulation. She will benefit from physical therapy services while in the hospital. Upon discharge, it is recommended she returns home with her son and grandsons and follows up with home health therapy services.   Outcome Measures     Row Name 05/25/23 1400 05/24/23 1400 05/23/23 1200       How much help from another person do you currently need...    Turning from your back to your side while in flat bed without using bedrails? 4 (P)   -JF 4  -DP (r) JF (t) DP (c) 4  -DP (r) JF (t) DP (c)    Moving from lying on back to sitting on the side of a flat bed without bedrails? 4 (P)   -JF 3  -DP (r) JF (t) DP (c) 3  -DP (r) JF (t) DP (c)    Moving to and from a bed to a chair (including a wheelchair)? 3 (P)   -JF 3  -DP (r) JF (t) DP (c) 3  -DP (r) JF (t) DP (c)    Standing up from a chair using your arms (e.g., wheelchair, bedside chair)? 3 (P)   -JF 3  -DP (r) JF (t) DP (c) 3  -DP (r) JF (t) DP (c)    Climbing 3-5 steps with a railing? 3 (P)   -JF 2  -DP (r) JF (t) DP (c) 2  -DP (r) JF (t) DP (c)    To walk in hospital room? 3 (P)   -JF 3  -DP (r) JF (t) DP (c) 3  -DP (r) JF (t) DP (c)    AM-PAC 6 Clicks Score (PT) 20 (P)   -JF 18  -DP (r) JF (t) 18  -DP (r) JF (t)       Functional Assessment    Outcome Measure Options AM-PAC 6 Clicks Basic Mobility (PT) (P)   -JF AM-PAC 6 Clicks Basic Mobility (PT)  -DP (r) JF (t) DP (c) AM-PAC 6 Clicks Basic Mobility (PT)  -DP (r) JF (t) DP (c)          User Key  (r) = Recorded By, (t) = Taken By, (c) = Cosigned By    Initials Name Provider Type    DP Handy Gabriel, PT Physical Therapist    Simon Marie,  PT Student PT Student                 Time Calculation:    PT Charges     Row Name 05/25/23 1420             Time Calculation    PT Received On 05/25/23 (P)   -JF         Timed Charges    75160 - Gait Training Minutes  12 (P)   -JF         Total Minutes    Timed Charges Total Minutes 12 (P)   -JF       Total Minutes 12 (P)   -JF            User Key  (r) = Recorded By, (t) = Taken By, (c) = Cosigned By    Initials Name Provider Type    Simon Marie, PT Student PT Student              Therapy Charges for Today     Code Description Service Date Service Provider Modifiers Qty    70605300826 HC PT THER PROC EA 15 MIN 5/24/2023 Simon Esparza, PT Student GP 1    11609034371 HC GAIT TRAINING EA 15 MIN 5/25/2023 Simon Esparza, PT Student GP 1          PT G-Codes  Outcome Measure Options: (P) AM-PAC 6 Clicks Basic Mobility (PT)  AM-PAC 6 Clicks Score (PT): (P) 20  AM-PAC 6 Clicks Score (OT): 16    Simon Esparza PT Student  5/25/2023

## 2023-05-25 NOTE — CONSULTS
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 Patient admitted with COVID-19 infection. Relates she has had a decreased appetite since becoming sick. Reports  lbs. Patient has had an 8 lb weight loss in an unspecified amount of time, but weight records can confirm since October 2022. Patient meets criteria for severe malnutrition with severe muscle wasting and fat loss.     Intake variable, but consuming % most meals. Continues to receive Boost Plus TID w/ meals to help meet increased nutrient needs r/t catabolic illness. RD will CTM per protocol.      Anthropometrics        Current Height, Weight Height: 165.1 cm (65\")  Weight: 48.2 kg (106 lb 4.2 oz)   Current BMI Body mass index is 17.68 kg/m².       Weight Hx  Wt Readings from Last 30 Encounters:   05/20/23 0647 48.2 kg (106 lb 4.2 oz)   05/08/23 1316 48.8 kg (107 lb 9.4 oz)   04/20/23 1039 48.4 kg (106 lb 11.2 oz)   04/03/23 0626 48.1 kg (106 lb 0.7 oz)   04/02/23 0702 48.2 kg (106 lb 4.2 oz)   04/01/23 0218 42.7 kg " (94 lb 2.2 oz)   03/31/23 2058 49 kg (108 lb 0.4 oz)   10/13/22 0935 52.1 kg (114 lb 12.8 oz)   09/21/22 1410 52.9 kg (116 lb 10 oz)   01/19/22 1559 53.5 kg (118 lb)   01/06/22 0853 53.9 kg (118 lb 13.3 oz)   12/01/21 1400 54 kg (119 lb)   10/20/21 1109 52.5 kg (115 lb 12.8 oz)   10/05/21 1147 52.8 kg (116 lb 6.4 oz)   12/15/20 0000 55.1 kg (121 lb 8 oz)   07/03/18 0000 52.7 kg (116 lb 2 oz)            Wt Change Observation -7.5% x 7 months - not significant     Estimated/Assessed Needs       Energy Requirements 35-40 kcal/kg   EST Needs (kcal/day) 5326-5930       Protein Requirements 1.5 g/kg   EST Daily Needs (g/day) 72       Fluid Requirements 30 ml/kg    Estimated Needs (mL/day) 1446     Labs/Medications         Pertinent Labs Reviewed.   Results from last 7 days   Lab Units 05/24/23  0441 05/23/23  0513 05/22/23  0518   SODIUM mmol/L 140 140 137   POTASSIUM mmol/L 4.3 4.1 3.8   CHLORIDE mmol/L 108* 106 105   CO2 mmol/L 24.7 25.9 19.4*   BUN mg/dL 22 15 14   CREATININE mg/dL 0.76 0.94 0.94   CALCIUM mg/dL 8.5* 8.2* 8.6   BILIRUBIN mg/dL 0.3 0.3 0.3   ALK PHOS U/L 71 62 69   ALT (SGPT) U/L 12 9 12   AST (SGOT) U/L 28 28 32   GLUCOSE mg/dL 112* 96 100*     Results from last 7 days   Lab Units 05/24/23  0441 05/23/23  0513 05/20/23  0706   MAGNESIUM mg/dL  --   --  1.9  1.9   HEMOGLOBIN g/dL 11.2*   < > 11.3*   HEMATOCRIT % 34.7   < > 34.8    < > = values in this interval not displayed.     COVID19   Date Value Ref Range Status   05/20/2023 Detected (C) Not Detected - Ref. Range Final     No results found for: HGBA1C      Pertinent Medications Reviewed.     Current Nutrition Orders & Evaluation of Intake       Oral Nutrition     Current PO Diet Diet: Regular/House Diet; Texture: Regular Texture (IDDSI 7); Fluid Consistency: Thin (IDDSI 0)   Supplement Orders Placed This Encounter      DIET MESSAGE Can u please this new pt a tray      Dietary Nutrition Supplements Boost Plus (Ensure Plus); vanilla        Malnutrition Severity Assessment      Patient meets criteria for : Severe Malnutrition         Nutrition Diagnosis         Nutrition Dx Problem 1 Severe malnutrition related to inadequate energy Intake as evidenced by decreased appetite., unintended wt change., body composition changes., patient report., family report., per nursing documentation. and report of minimal PO intake.       Nutrition Intervention         Vanilla Boost Plus TID (+1080 kcal, 42 g protein daily)     Medical Nutrition Therapy/Nutrition Education          Learner     Readiness N/A  Education not indicated at this time     Method     Response N/A  N/A     Monitor/Evaluation        Monitor Per protocol, PO intake, Supplement intake, Pertinent labs, Weight       Nutrition Discharge Plan         To be determined       Electronically signed by:  Felecia May RD  05/25/23 07:56 EDT

## 2023-05-25 NOTE — PLAN OF CARE
Goal Outcome Evaluation:  Plan of Care Reviewed With: patient        Progress: improving  Outcome Evaluation: patient resting in bed, no/co of discomfort, pt states she feels better today, pt ambulating to bathroom with walker and 1- assist. pt alert and oriented, call light within reach.

## 2023-05-25 NOTE — PLAN OF CARE
Goal Outcome Evaluation:              Outcome Evaluation: Pt vss. Pt resting well. Pt to go to [facility] tmr. Pt medciated for pain with [place]. No other complaints. Continue plan of care.

## 2023-05-26 ENCOUNTER — READMISSION MANAGEMENT (OUTPATIENT)
Dept: CALL CENTER | Facility: HOSPITAL | Age: 78
End: 2023-05-26
Payer: MEDICARE

## 2023-05-26 VITALS
HEART RATE: 93 BPM | DIASTOLIC BLOOD PRESSURE: 68 MMHG | OXYGEN SATURATION: 98 % | BODY MASS INDEX: 17.7 KG/M2 | RESPIRATION RATE: 16 BRPM | SYSTOLIC BLOOD PRESSURE: 110 MMHG | TEMPERATURE: 98 F | WEIGHT: 106.26 LBS | HEIGHT: 65 IN

## 2023-05-26 PROBLEM — D89.831 CYTOKINE RELEASE SYNDROME, GRADE 1: Status: ACTIVE | Noted: 2023-05-26

## 2023-05-26 PROCEDURE — 97535 SELF CARE MNGMENT TRAINING: CPT

## 2023-05-26 RX ADMIN — GABAPENTIN 600 MG: 300 CAPSULE ORAL at 05:49

## 2023-05-26 RX ADMIN — LISINOPRIL 10 MG: 10 TABLET ORAL at 10:19

## 2023-05-26 NOTE — THERAPY TREATMENT NOTE
Patient Name: Devi Bobo  : 1945    MRN: [id number]                              Today's Date: 2023       Admit Date: 2023    Visit Dx:     ICD-10-CM ICD-9-CM   1. Weakness  R53.1 780.79   2. Decreased activities of daily living (ADL)  Z78.9 V49.89   3. Difficulty in walking  R26.2 719.7     Patient Active Problem List   Diagnosis   • Bronchitis   • Cataract   • Chronic pelvic pain in female   • Degeneration of lumbar intervertebral disc   • Encounter for long-term (current) use of insulin   • Frequency of micturition   • Herniation of intervertebral disc   • High blood pressure   • HTN (hypertension)   • Lumbar spondylosis   • Nocturia   • Osteoporosis   • Pain, generalized   • Urge incontinence   • Bladder disorder   • Female bladder prolapse   • Urinary incontinence   • Urinary urgency   • Piriformis syndrome of both sides   • Vaginal atrophy   • Atypical squamous cells cannot exclude high grade squamous intraepithelial lesion on cytologic smear of cervix (ASC-H)   • High grade squamous intraepithelial lesion (HGSIL) on cytologic smear of cervix   • Sciatic nerve lesion   • Osteoarthritis   • Lumbosacral spondylosis without myelopathy   • Chronic vertigo   • Chronic fatigue syndrome   • Cervical spondylosis without myelopathy   • Abnormal gait   • Dysplasia of cervix, low grade (CASH 1)   • Postoperative visit   • Postmenopausal osteoporosis   • Sepsis, due to unspecified organism, unspecified whether acute organ dysfunction present   • Severe Malnutrition (HCC)   • Weakness   • COVID-19   • Pharyngitis   • Severe malnutrition     Past Medical History:   Diagnosis Date   • Age-related osteoporosis without current pathological fracture    • Allergy     SULFA DRUGS   MILD   • Atypical squamous cells cannot exclude high grade squamous intraepithelial lesion on cytologic smear of cervix (ASC-H)    • 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)    •  Hypertension    • Lesion of sciatic nerve, bilateral lower limbs    • Osteoporosis    • Overactive bladder    • Piriformis syndrome    • Polyp of colon    • Postmenopausal atrophic vaginitis    • Vaginal atrophy      Past Surgical History:   Procedure Laterality Date   • COLONOSCOPY  2019   • LEEP N/A 1/6/2022    Procedure: LOOP ELECTROCAUTERY EXCISION PROCEDURE;  Surgeon: El Álvarez MD;  Location: [facility];  Service: Gynecology;  Laterality: N/A;   • TUBAL ABDOMINAL LIGATION        General Information     Row Name 05/26/23 42          OT Time and Intention    Document Type therapy note (daily note)  -PG     Mode of Treatment individual therapy;occupational therapy  -PG           User Key  (r) = Recorded By, (t) = Taken By, (c) = Cosigned By    Initials Name Provider Type    PG Domenico Charles, KENNETH Occupational Therapist                 Mobility/ADL's     Row Name 05/26/23 42          Bed-Chair Transfer    Bed-Chair Pax (Transfers) contact guard;standby assist  -PG     Row Name 05/26/23 42          Activities of Daily Living    BADL Assessment/Intervention grooming  -PG     Row Name 05/26/23 [facility]          Grooming Assessment/Training    Pax Level (Grooming) grooming skills;oral care regimen;hair care, combing/brushing;contact guard assist;standby assist;verbal cues  -PG     Position (Grooming) supported standing  -PG           User Key  (r) = Recorded By, (t) = Taken By, (c) = Cosigned By    Initials Name Provider Type    PG Domenico Charles, KENNETH Occupational Therapist               Obj/Interventions    No documentation.                Goals/Plan    No documentation.                Clinical Impression     Row Name 05/26/23 [facility]          Plan of Care Review    Progress improving  -PG     Outcome Evaluation Patient now walking to the sink and able to perform grooming in standing with contact-guard/standby assist and rolling walker for support.  Continue to [facility]  in order to return home at prior functional level  -PG           User Key  (r) = Recorded By, (t) = Taken By, (c) = Cosigned By    Initials Name Provider Type    PG Domenico Charles OT Occupational Therapist               Outcome Measures     Row Name 05/26/23 0843          How much help from another is currently needed...    Putting on and taking off regular lower body clothing? 3  -PG     Bathing (including washing, rinsing, and drying) 3  -PG     Toileting (which includes using toilet bed pan or urinal) 3  -PG     Putting on and taking off regular upper body clothing 3  -PG     Taking care of personal grooming (such as brushing teeth) 3  -PG     Eating meals 4  -PG     AM-PAC 6 Clicks Score (OT) 19  -PG     Row Name 05/25/23 2208          How much help from another person do you currently need...    Turning from your back to your side while in flat bed without using bedrails? 4  -AH     Moving from lying on back to sitting on the side of a flat bed without bedrails? 4  -AH     Moving to and from a bed to a chair (including a wheelchair)? 4  -AH     Standing up from a chair using your arms (e.g., wheelchair, bedside chair)? 3  -AH     Climbing 3-5 steps with a railing? 3  -AH     To walk in hospital room? 4  -AH     AM-PAC 6 Clicks Score (PT) 22  -AH     Highest level of mobility 7 --> Walked 25 feet or more  -     Row Name 05/26/23 0843          Functional Assessment    Outcome Measure Options AM-PAC 6 Clicks Daily Activity (OT);Optimal Instrument  -PG     Row Name 05/26/23 0843          Optimal Instrument    Optimal Instrument Optimal - 3  -PG     Bending/Stooping 2  -PG     Standing 2  -PG     Reaching 1  -PG           User Key  (r) = Recorded By, (t) = Taken By, (c) = Cosigned By    Initials Name Provider Type     Leigha April, RNA Registered Nurse    Domenico Balderrama OT Occupational Therapist                Occupational Therapy Education     Title: PT OT SLP Therapies (Done)     Topic: Occupational  Therapy (Done)     Point: ADL training (Done)     Description:   Instruct learner(s) on proper safety adaptation and remediation techniques during self care or transfers.   Instruct in proper use of assistive devices.              Learning Progress Summary           Patient Acceptance, E,D, DU by PG at 5/22/2023 1018                   Point: Home exercise program (Done)     Description:   Instruct learner(s) on appropriate technique for monitoring, assisting and/or progressing therapeutic exercises/activities.              Learning Progress Summary           Patient Acceptance, E,D, DU by PG at 5/22/2023 1018                   Point: Precautions (Done)     Description:   Instruct learner(s) on prescribed precautions during self-care and functional transfers.              Learning Progress Summary           Patient Acceptance, E,D, DU by PG at 5/22/2023 1018                   Point: Body mechanics (Done)     Description:   Instruct learner(s) on proper positioning and spine alignment during self-care, functional mobility activities and/or exercises.              Learning Progress Summary           Patient Acceptance, E,D, DU by PG at 5/22/2023 1018                               User Key     Initials Effective Dates Name Provider Type Discipline     06/16/21 -  Domenico Charles OT Occupational Therapist OT              OT Recommendation and Plan  Planned Therapy Interventions (OT): activity tolerance training, strengthening exercise, transfer/mobility retraining, patient/caregiver education/training, occupation/activity based interventions  Therapy Frequency (OT): 5 times/wk  Plan of Care Review  Plan of Care Reviewed With: patient  Progress: improving  Outcome Evaluation: Patient now walking to the sink and able to perform grooming in standing with contact-guard/standby assist and rolling walker for support.  Continue to [facility] in order to return home at prior functional level     Time Calculation:     Time Calculation- OT     Row Name 05/26/23 0845             Time Calculation- OT    OT Received On 05/26/23  -PG      OT Goal Re-Cert Due Date 05/31/23  -PG         Timed Charges    93823 - OT Self Care/Mgmt Minutes 12  -PG         Total Minutes    Timed Charges Total Minutes 12  -PG       Total Minutes 12  -PG            User Key  (r) = Recorded By, (t) = Taken By, (c) = Cosigned By    Initials Name Provider Type    PG Domenico Charles OT Occupational Therapist              Therapy Charges for Today     Code Description Service Date Service Provider Modifiers Qty    [id number] HC OT SELF CARE/MGMT/TRAIN EA 15 MIN 5/26/2023 Domenico Charles OT GO 1               Domenico Charles OT  5/26/2023

## 2023-05-26 NOTE — DISCHARGE SUMMARY
[facility]         DISCHARGE SUMMARY    Patient Name: Devi Bobo  : 1945  MRN: [id number]    Date of Admission: 2023  Date of Discharge:  23  Primary Care Physician: Raad Del Toro MD    Consults     Date and Time Order Name Status Description    2023  2:59 PM Inpatient Hospitalist Consult            Presenting Problem:   Weakness [R53.1]  COVID-19 [U07.1]    Active and Resolved Hospital Problems:  Active Hospital Problems    Diagnosis POA   • **Weakness [R53.1] Yes   • Cytokine release syndrome, grade 1 [D89.831] No   • Severe malnutrition [E43] Yes   • Pharyngitis [J02.9] Clinically Undetermined   • COVID-19 [U07.1] Yes      Resolved Hospital Problems   No resolved problems to display.         Hospital Course     Hospital Course:  Devi Bobo is a 78 y.o. female   Patient was brought to the emergency department by her family after she slid off the toilet to the floor and was unable to get up.  Evaluation in the emergency department revealed a temperature of 100.5 but imaging and laboratory tests were within normal limits.  The emergency department physician recommended admission to the hospital due to severe weakness and the patient was unable to mobilize of the bed and ambulate  She was admitted to regular floor and later in the night the COVID-19 test was reported to be positive and the patient affirmed that she had been exposed to COVID-19 a few days prior to presentation  Remdesivir was started  Patient gradually improved but she was still noted to be weak  She was referred to acute care rehabilitation hospital and was accepted for admission  Initially the patient agreed for admission to the rehab hospital but today she changed her mind and insisted on discharge to home  Since the patient is clinically improved and the goal of acute care hospitalization was achieved it was decided to discharge the patient to home and ordered therapies through home  health care          Day of Discharge     Vital Signs:  Temp:  [97.7 °F (36.5 °C)-98.3 °F (36.8 °C)] 98 °F (36.7 °C)  Heart Rate:  [] 93  Resp:  [16-18] 16  BP: (110-135)/(62-83) 110/68  Physical Exam:  Awake, alert, oriented x3.  Lungs: Clear to auscultation bilaterally  Cardiovascular: Rate and rhythm is regular  Neurological: No focal deficits      Pertinent  and/or Most Recent Results     LAB RESULTS:  Lab Results   Component Value Date    WBC 3.84 05/24/2023    HGB 11.2 (L) 05/24/2023    HCT 34.7 05/24/2023    MCV 91.6 05/24/2023     05/24/2023     Lab Results   Component Value Date    GLUCOSE 112 (H) 05/24/2023    BUN 22 05/24/2023    CREATININE 0.76 05/24/2023    EGFR 81.3 05/24/2023    BCR 28.9 (H) 05/24/2023    K 4.3 05/24/2023    CO2 24.7 05/24/2023    CALCIUM 8.5 (L) 05/24/2023    ALBUMIN 3.3 (L) 05/24/2023    BILITOT 0.3 05/24/2023    AST 28 05/24/2023    ALT 12 05/24/2023     Brief Urine Lab Results  (Last result in the past 365 days)      Color   Clarity   Blood   Leuk Est   Nitrite   Protein   CREAT   Urine HCG        05/20/23 1127 Yellow   Clear   Negative   Negative   Negative   Negative               Microbiology Results (last 10 days)     Procedure Component Value - Date/Time    Rapid Strep A Screen - Swab, Throat [[id number]]  (Normal) Collected: 05/22/23 1132    Lab Status: Final result Specimen: Swab from Throat Updated: 05/22/23 1204     Strep A Ag Negative    Beta Strep Culture, Throat - Swab, Throat [[id number]]  (Normal) Collected: 05/22/23 1132    Lab Status: Final result Specimen: Swab from Throat Updated: 05/23/23 0951     Throat Culture, Beta Strep No Beta Hemolytic Streptococcus Isolated    Narrative:      Group A Strep incidence is low in adults. Positive culture for Beta hemolytic Streptococcus species can reflect colonization and not true infection. Please correlate clinically.    Influenza Antigen, Rapid - Swab, Nasopharynx [[id number]]  (Normal) Collected: 05/20/23 1406     Lab Status: Final result Specimen: Swab from Nasopharynx Updated: 05/20/23 1437     Influenza A Ag, EIA Negative     Influenza B Ag, EIA Negative    COVID-19,APTIMA PANTHER([facility]),[facility], NP/OP SWAB IN UTM/VTM/SALINE TRANSPORT MEDIA,24 HR TAT - Swab, Nasal Cavity [[id number]]  (Abnormal) Collected: 05/20/23 1402    Lab Status: Final result Specimen: Swab from Nasal Cavity Updated: 05/20/23 2337     COVID19 Detected    Narrative:      Fact sheet for providers: https://www.fda.gov/media/531927/download     Fact sheet for patients: https://www.fda.gov/media/240380/download    Test performed by RT PCR.    Blood Culture - Blood, Arm, Right [[id number]]  (Normal) Collected: 05/20/23 0917    Lab Status: Final result Specimen: Blood from Arm, Right Updated: 05/25/23 0945     Blood Culture No growth at 5 days    Blood Culture - Blood, Arm, Left [[id number]]  (Normal) Collected: 05/20/23 0917    Lab Status: Final result Specimen: Blood from Arm, Left Updated: 05/25/23 0945     Blood Culture No growth at 5 days          CT Head Without Contrast    Result Date: 5/20/2023  Impression:  No acute intracranial process or calvarial fracture identified.     ANGELITO KEYS MD       Electronically Signed and Approved By: ANGELITO KEYS MD on 5/20/2023 at 9:21             XR Chest 1 View    Result Date: 5/20/2023  Impression:  No acute cardiopulmonary process identified.       ANGELITO KEYS MD       Electronically Signed and Approved By: ANGELITO KEYS MD on 5/20/2023 at 8:36                           Labs Pending at Discharge:        Discharge Details        Discharge Medications      Continue These Medications      Instructions Start Date   Calcium Carbonate-Vitamin D 600-200 MG-UNIT capsule   1 capsule, Oral, Daily      estradiol 0.1 MG/GM vaginal cream  Commonly known as: ESTRACE   Insert 1 gram into the vagina 2-3 times a week      gabapentin 600 MG tablet  Commonly known as: NEURONTIN   600 mg, Oral, 3 Times Daily       HYDROcodone-acetaminophen 5-325 MG per tablet  Commonly known as: NORCO   1 tablet, Oral, Every 8 Hours PRN      lisinopril 10 MG tablet  Commonly known as: PRINIVIL,ZESTRIL   10 mg, Oral, Daily      multivitamin with minerals tablet tablet   1 tablet, Oral, Daily      ondansetron 4 MG tablet  Commonly known as: ZOFRAN   1 tablet, Oral, 3 Times Daily PRN      Prolia 60 MG/ML solution prefilled syringe syringe  Generic drug: denosumab   60 mg, Subcutaneous, Every 6 Months, Last dose: 22             Allergies   Allergen Reactions   • Sulfa Antibiotics Anaphylaxis   • Nitrofurantoin Confusion         Discharge Disposition:  Home-Health Care [facility]    Diet:  Hospital:  Diet Order   Procedures   • Diet: Regular/House Diet; Texture: Regular Texture (IDDSI 7); Fluid Consistency: Thin (IDDSI 0)         Discharge Activity: As tolerated        Future Appointments   Date Time Provider Department Center   10/13/2023 10:15 AM Sanjiv Kelly APRN [facility]           Time spent on Discharge including face to face service: 31 minutes    Electronically signed by Ty Barroso MD, 23, 9:23 AM EDT.                 [facility]         DISCHARGE SUMMARY    Patient Name: Devi Bobo  : 1945  MRN: [id number]    Date of Admission: 2023  Date of Discharge:    Primary Care Physician: Raad Del Toro MD    Consults     Date and Time Order Name Status Description    2023  2:59 PM Inpatient Hospitalist Consult            Presenting Problem:   Weakness [R53.1]  COVID-19 [U07.1]    Active and Resolved Hospital Problems:  Active Hospital Problems    Diagnosis POA   • **Weakness [R53.1] Yes   • Cytokine release syndrome, grade 1 [D89.831] No   • Severe malnutrition [E43] Yes   • Pharyngitis [J02.9] Clinically Undetermined   • COVID-19 [U07.1] Yes      Resolved Hospital Problems   No resolved problems to display.         Hospital Course     Hospital Course:  Devi Bobo is a 78 y.o.  female    Day of Discharge     Vital Signs:  Temp:  [97.7 °F (36.5 °C)-98.3 °F (36.8 °C)] 98 °F (36.7 °C)  Heart Rate:  [] 93  Resp:  [16-18] 16  BP: (110-135)/(62-83) 110/68  Physical Exam:      Pertinent  and/or Most Recent Results     LAB RESULTS:  Lab Results   Component Value Date    WBC 3.84 05/24/2023    HGB 11.2 (L) 05/24/2023    HCT 34.7 05/24/2023    MCV 91.6 05/24/2023     05/24/2023     Lab Results   Component Value Date    GLUCOSE 112 (H) 05/24/2023    BUN 22 05/24/2023    CREATININE 0.76 05/24/2023    EGFR 81.3 05/24/2023    BCR 28.9 (H) 05/24/2023    K 4.3 05/24/2023    CO2 24.7 05/24/2023    CALCIUM 8.5 (L) 05/24/2023    ALBUMIN 3.3 (L) 05/24/2023    BILITOT 0.3 05/24/2023    AST 28 05/24/2023    ALT 12 05/24/2023     Brief Urine Lab Results  (Last result in the past 365 days)      Color   Clarity   Blood   Leuk Est   Nitrite   Protein   CREAT   Urine HCG        05/20/23 1127 Yellow   Clear   Negative   Negative   Negative   Negative               Microbiology Results (last 10 days)     Procedure Component Value - Date/Time    Rapid Strep A Screen - Swab, Throat [[id number]]  (Normal) Collected: 05/22/23 1132    Lab Status: Final result Specimen: Swab from Throat Updated: 05/22/23 1204     Strep A Ag Negative    Beta Strep Culture, Throat - Swab, Throat [[id number]]  (Normal) Collected: 05/22/23 1132    Lab Status: Final result Specimen: Swab from Throat Updated: 05/23/23 0951     Throat Culture, Beta Strep No Beta Hemolytic Streptococcus Isolated    Narrative:      Group A Strep incidence is low in adults. Positive culture for Beta hemolytic Streptococcus species can reflect colonization and not true infection. Please correlate clinically.    Influenza Antigen, Rapid - Swab, Nasopharynx [[id number]]  (Normal) Collected: 05/20/23 1402    Lab Status: Final result Specimen: Swab from Nasopharynx Updated: 05/20/23 1437     Influenza A Ag, EIA Negative     Influenza B Ag, EIA Negative     COVID-19,[facility]TIMA PANTHER([facility]),[facility], NP/OP SWAB IN UTM/VTM/SALINE TRANSPORT MEDIA,24 HR TAT - Swab, Nasal Cavity [[id number]]  (Abnormal) Collected: 05/20/23 1402    Lab Status: Final result Specimen: Swab from Nasal Cavity Updated: 05/20/23 2337     COVID19 Detected    Narrative:      Fact sheet for providers: https://www.fda.gov/media/529145/download     Fact sheet for patients: https://www.fda.gov/media/533892/download    Test performed by RT PCR.    Blood Culture - Blood, Arm, Right [[id number]]  (Normal) Collected: 05/20/23 0917    Lab Status: Final result Specimen: Blood from Arm, Right Updated: 05/25/23 0945     Blood Culture No growth at 5 days    Blood Culture - Blood, Arm, Left [[id number]]  (Normal) Collected: 05/20/23 0917    Lab Status: Final result Specimen: Blood from Arm, Left Updated: 05/25/23 0945     Blood Culture No growth at 5 days          CT Head Without Contrast    Result Date: 5/20/2023  Impression:  No acute intracranial process or calvarial fracture identified.     ANGELITO KEYS MD       Electronically Signed and Approved By: ANGELITO KEYS MD on 5/20/2023 at 9:21             XR Chest 1 View    Result Date: 5/20/2023  Impression:  No acute cardiopulmonary process identified.       ANGELITO KEYS MD       Electronically Signed and Approved By: ANGELITO KEYS MD on 5/20/2023 at 8:36                           Labs Pending at Discharge:        Discharge Details        Discharge Medications      Continue These Medications      Instructions Start Date   Calcium Carbonate-Vitamin D 600-200 MG-UNIT capsule   1 capsule, Oral, Daily      estradiol 0.1 MG/GM vaginal cream  Commonly known as: ESTRACE   Insert 1 gram into the vagina 2-3 times a week      gabapentin 600 MG tablet  Commonly known as: NEURONTIN   600 mg, Oral, 3 Times Daily      HYDROcodone-acetaminophen 5-325 MG per tablet  Commonly known as: NORCO   1 tablet, Oral, Every 8 Hours PRN      lisinopril 10 MG tablet  Commonly  known as: PRINIVIL,ZESTRIL   10 mg, Oral, Daily      multivitamin with minerals tablet tablet   1 tablet, Oral, Daily      ondansetron 4 MG tablet  Commonly known as: ZOFRAN   1 tablet, Oral, 3 Times Daily PRN      Prolia 60 MG/ML solution prefilled syringe syringe  Generic drug: denosumab   60 mg, Subcutaneous, Every 6 Months, Last dose: 9/21/22             Allergies   Allergen Reactions   • Sulfa Antibiotics Anaphylaxis   • Nitrofurantoin Confusion         Discharge Disposition:  Home-Health Care [facility]    Diet:  Hospital:  Diet Order   Procedures   • Diet: Regular/House Diet; Texture: Regular Texture (IDDSI 7); Fluid Consistency: Thin (IDDSI 0)         Discharge Activity:         Future Appointments   Date Time Provider Department Center   10/13/2023 10:15 AM Sanjiv Kelly APRN [facility]           Electronically signed by Ty Barroso MD, 05/26/23, 9:23 AM EDT.

## 2023-05-27 NOTE — OUTREACH NOTE
Prep Survey      Flowsheet Row Responses   [facility] facility patient discharged from? [facility]   Is LACE score < 7 ? No   Eligibility Readm Mgmt   Discharge diagnosis Weakness   Does the patient have one of the following disease processes/diagnoses(primary or secondary)? Other   Does the patient have Home health ordered? No   Is there a DME ordered? No   Prep survey completed? Yes            Daily STEPHENS - Registered Nurse

## 2023-06-06 ENCOUNTER — READMISSION MANAGEMENT (OUTPATIENT)
Dept: CALL CENTER | Facility: HOSPITAL | Age: 78
End: 2023-06-06
Payer: MEDICARE

## 2023-06-06 NOTE — OUTREACH NOTE
Medical Week 2 Survey      Flowsheet Row Responses   [facility] patient discharged from? [facility]   Does the patient have one of the following disease processes/diagnoses(primary or secondary)? Other   Week 2 attempt successful? No   Unsuccessful attempts Attempt 1            Roseline Guy Registered Nurse

## 2023-06-14 ENCOUNTER — READMISSION MANAGEMENT (OUTPATIENT)
Dept: CALL CENTER | Facility: HOSPITAL | Age: 78
End: 2023-06-14
Payer: MEDICARE

## 2023-06-14 NOTE — OUTREACH NOTE
Medical Week 3 Survey      Flowsheet Row Responses   [facility] patient discharged from? [facility]   Does the patient have one of the following disease processes/diagnoses(primary or secondary)? Other   Week 3 attempt successful? No   Unsuccessful attempts Attempt 1            Roseline Guy Registered Nurse

## 2023-08-08 ENCOUNTER — OFFICE VISIT (OUTPATIENT)
Dept: OBSTETRICS AND GYNECOLOGY | Facility: CLINIC | Age: 78
End: 2023-08-08
Payer: MEDICARE

## 2023-08-08 VITALS
HEIGHT: 65 IN | WEIGHT: 107.54 LBS | DIASTOLIC BLOOD PRESSURE: 83 MMHG | BODY MASS INDEX: 17.92 KG/M2 | SYSTOLIC BLOOD PRESSURE: 149 MMHG

## 2023-08-08 DIAGNOSIS — N95.2 VAGINAL ATROPHY: Primary | ICD-10-CM

## 2023-08-08 DIAGNOSIS — N77.1 VAGINITIS, VULVITIS AND VULVOVAGINITIS IN DISEASES CLASSIFIED ELSEWHERE: ICD-10-CM

## 2023-08-08 DIAGNOSIS — N94.9 VAGINAL DISCOMFORT: ICD-10-CM

## 2023-08-08 LAB
BILIRUB BLD-MCNC: NEGATIVE MG/DL
GLUCOSE UR STRIP-MCNC: NEGATIVE MG/DL
KETONES UR QL: NEGATIVE
LEUKOCYTE EST, POC: NEGATIVE
NITRITE UR-MCNC: NEGATIVE MG/ML
PH UR: 6 [PH] (ref 5–8)
PROT UR STRIP-MCNC: NEGATIVE MG/DL
RBC # UR STRIP: NEGATIVE /UL
SP GR UR: 1.01 (ref 1–1.03)
UROBILINOGEN UR QL: NORMAL

## 2023-08-08 PROCEDURE — 87086 URINE CULTURE/COLONY COUNT: CPT | Performed by: NURSE PRACTITIONER

## 2023-08-08 RX ORDER — ESTRADIOL 0.1 MG/G
CREAM VAGINAL
Qty: 42 G | Refills: 6 | Status: SHIPPED | OUTPATIENT
Start: 2023-08-08

## 2023-08-08 NOTE — ASSESSMENT & PLAN NOTE
Severe atrophy noted on exam, non-tender to palpation.  Will send urine for culture and swab for vaginal infection screen. Recommend using vaginal estrogen consistently, 2-3 evenings per week.  Will also send topical steroid for patient to use externally to help with inflammation.

## 2023-08-08 NOTE — PROGRESS NOTES
"GYN Visit    CC:   Chief Complaint   Patient presents with    Vaginal Pain    Medication Problem     Patient would like to discuss another option for the Estradiol vagical cream.        HPI:   78 y.o. Contraception or HRT: Post menopausal    Her vagina has been hurting for past few days, hurting really bad and burning.  She tried using her vaginal estrogen but it is not helping.   Her pain medication is not helping.   Denies any sexual partners.  No burning with urination.     History: PMHx, Meds, Allergies, PSHx, Social Hx, and POBHx all reviewed and updated.    Review of Systems   Constitutional: Negative.    Genitourinary:  Positive for vaginal pain.     PHYSICAL EXAM:  /83   Ht 165.1 cm (65\")   Wt 48.8 kg (107 lb 8.6 oz)   BMI 17.90 kg/mý      Physical Exam  Vitals and nursing note reviewed. Exam conducted with a chaperone present.   Constitutional:       Appearance: Normal appearance. She is well-developed and well-groomed.   HENT:      Head: Normocephalic.   Eyes:      Pupils: Pupils are equal, round, and reactive to light.   Genitourinary:     General: Normal vulva.      Exam position: Lithotomy position.      Pubic Area: No rash or pubic lice.       Labia:         Right: No rash, tenderness, lesion or injury.         Left: No rash, tenderness, lesion or injury.       Vagina: Normal. No foreign body. No vaginal discharge, erythema, tenderness, bleeding or lesions.      Cervix: No discharge.      Uterus: Not tender.       Comments: Cervix is stenotic, no discharge noted. Cervix and uterus not tender to palpation.  Severe atrophy noted.   Skin:     General: Skin is warm and dry.   Neurological:      General: No focal deficit present.      Mental Status: She is alert.       ASSESSMENT AND PLAN:  Diagnoses and all orders for this visit:    1. Vaginal atrophy (Primary)  Assessment & Plan:  Severe atrophy noted on exam, non-tender to palpation.  Will send urine for culture and swab for vaginal " infection screen. Recommend using vaginal estrogen consistently, 2-3 evenings per week.  Will also send topical steroid for patient to use externally to help with inflammation.     Orders:  -     estradiol (ESTRACE) 0.1 MG/GM vaginal cream; Insert 1 gram into the vagina 2-3 times a week  Dispense: 42 g; Refill: 6  -     triamcinolone (KENALOG) 0.1 % ointment; Used twice daily for 2 weeks and then taper to once daily for 2 weeks.  If burning remains well controlled using every other day as needed.  Dispense: 30 g; Refill: 2    2. Vaginal discomfort  -     Urine Culture - Urine, Urine, Clean Catch  -     NuSwab VG+ - Swab, Cervix  -     POC Urinalysis Dipstick    3. Vaginitis, vulvitis and vulvovaginitis in diseases classified elsewhere  -     Urine Culture - Urine, Urine, Clean Catch  -     NuSwab VG+ - Swab, Cervix  -     triamcinolone (KENALOG) 0.1 % ointment; Used twice daily for 2 weeks and then taper to once daily for 2 weeks.  If burning remains well controlled using every other day as needed.  Dispense: 30 g; Refill: 2        Counseling:  Call with concerns    Follow Up:  Return for as needed.      Sanjiv Kelly, DEMETRA  08/08/2023    [facility]  [facility]  [address] [id number]  Dept: [phone]  Loc: [phone]

## 2023-08-09 LAB — BACTERIA SPEC AEROBE CULT: NORMAL

## 2023-08-10 LAB
A VAGINAE DNA VAG QL NAA+PROBE: NORMAL SCORE
BVAB2 DNA VAG QL NAA+PROBE: NORMAL SCORE
C ALBICANS DNA VAG QL NAA+PROBE: NEGATIVE
C GLABRATA DNA VAG QL NAA+PROBE: NEGATIVE
C TRACH DNA VAG QL NAA+PROBE: NEGATIVE
MEGA1 DNA VAG QL NAA+PROBE: NORMAL SCORE
N GONORRHOEA DNA VAG QL NAA+PROBE: NEGATIVE
T VAGINALIS DNA VAG QL NAA+PROBE: NEGATIVE

## 2023-10-13 ENCOUNTER — TELEPHONE (OUTPATIENT)
Dept: OBSTETRICS AND GYNECOLOGY | Facility: CLINIC | Age: 78
End: 2023-10-13
Payer: MEDICARE

## 2023-12-06 ENCOUNTER — TRANSCRIBE ORDERS (OUTPATIENT)
Dept: ADMINISTRATIVE | Facility: HOSPITAL | Age: 78
End: 2023-12-06
Payer: MEDICARE

## 2023-12-06 ENCOUNTER — LAB (OUTPATIENT)
Dept: LAB | Facility: HOSPITAL | Age: 78
End: 2023-12-06
Payer: MEDICARE

## 2023-12-06 DIAGNOSIS — M81.0 OSTEOPOROSIS, POST-MENOPAUSAL: Primary | ICD-10-CM

## 2023-12-06 DIAGNOSIS — Z79.899 ENCOUNTER FOR LONG-TERM (CURRENT) USE OF OTHER MEDICATIONS: ICD-10-CM

## 2023-12-06 DIAGNOSIS — M81.0 OSTEOPOROSIS, POST-MENOPAUSAL: ICD-10-CM

## 2023-12-06 LAB
25(OH)D3 SERPL-MCNC: 56.1 NG/ML (ref 30–100)
CALCIUM SPEC-SCNC: 9.5 MG/DL (ref 8.6–10.5)
CREAT SERPL-MCNC: 1.28 MG/DL (ref 0.57–1)
EGFRCR SERPLBLD CKD-EPI 2021: 43.5 ML/MIN/1.73

## 2023-12-06 PROCEDURE — 82565 ASSAY OF CREATININE: CPT

## 2023-12-06 PROCEDURE — 82310 ASSAY OF CALCIUM: CPT

## 2023-12-06 PROCEDURE — 82306 VITAMIN D 25 HYDROXY: CPT

## 2023-12-06 PROCEDURE — 36415 COLL VENOUS BLD VENIPUNCTURE: CPT

## 2023-12-11 PROCEDURE — 87086 URINE CULTURE/COLONY COUNT: CPT | Performed by: NURSE PRACTITIONER

## 2023-12-14 ENCOUNTER — TRANSCRIBE ORDERS (OUTPATIENT)
Dept: ADMINISTRATIVE | Facility: HOSPITAL | Age: 78
End: 2023-12-14
Payer: MEDICARE

## 2023-12-14 DIAGNOSIS — M81.0 SENILE OSTEOPOROSIS: Primary | ICD-10-CM

## 2024-01-03 ENCOUNTER — HOSPITAL ENCOUNTER (OUTPATIENT)
Dept: INFUSION THERAPY | Facility: HOSPITAL | Age: 79
Discharge: HOME | End: 2024-01-03
Admitting: INTERNAL MEDICINE
Payer: MEDICARE

## 2024-01-03 VITALS
RESPIRATION RATE: 16 BRPM | HEIGHT: 65 IN | TEMPERATURE: 98.3 F | OXYGEN SATURATION: 100 % | HEART RATE: 95 BPM | WEIGHT: 107.14 LBS | DIASTOLIC BLOOD PRESSURE: 70 MMHG | SYSTOLIC BLOOD PRESSURE: 154 MMHG | BODY MASS INDEX: 17.85 KG/M2

## 2024-01-03 DIAGNOSIS — M81.0 POSTMENOPAUSAL OSTEOPOROSIS: Primary | ICD-10-CM

## 2024-01-03 PROCEDURE — 96372 THER/PROPH/DIAG INJ SC/IM: CPT

## 2024-01-03 PROCEDURE — 25010000002 DENOSUMAB 60 MG/ML SOLUTION PREFILLED SYRINGE: Performed by: INTERNAL MEDICINE

## 2024-01-03 RX ADMIN — DENOSUMAB 60 MG: 60 INJECTION SUBCUTANEOUS at 16:15

## 2024-01-12 DIAGNOSIS — N95.2 VAGINAL ATROPHY: ICD-10-CM

## 2024-01-12 RX ORDER — ESTRADIOL 0.1 MG/G
CREAM VAGINAL
Qty: 42 G | Refills: 6 | Status: SHIPPED | OUTPATIENT
Start: 2024-01-12

## 2024-03-14 ENCOUNTER — OFFICE VISIT (OUTPATIENT)
Dept: PODIATRY | Facility: CLINIC | Age: 79
End: 2024-03-14
Payer: MEDICARE

## 2024-03-14 VITALS
HEIGHT: 65 IN | OXYGEN SATURATION: 96 % | SYSTOLIC BLOOD PRESSURE: 157 MMHG | DIASTOLIC BLOOD PRESSURE: 90 MMHG | HEART RATE: 96 BPM | BODY MASS INDEX: 17.16 KG/M2 | WEIGHT: 103 LBS | TEMPERATURE: 98.6 F

## 2024-03-14 DIAGNOSIS — L60.2 ONYCHOGRYPHOSIS: Primary | ICD-10-CM

## 2024-03-14 DIAGNOSIS — M79.672 FOOT PAIN, BILATERAL: ICD-10-CM

## 2024-03-14 DIAGNOSIS — M79.671 FOOT PAIN, BILATERAL: ICD-10-CM

## 2024-03-14 DIAGNOSIS — L60.0 ONYCHOCRYPTOSIS: ICD-10-CM

## 2024-03-14 DIAGNOSIS — B35.1 ONYCHOMYCOSIS: ICD-10-CM

## 2024-03-14 NOTE — PROGRESS NOTES
[facility] - PODIATRY    Today's Date: 03/14/24    Patient Name: Devi Bobo  MRN: [id number]  CSN: [id number]  PCP: Raad Del Toro MD, Last PCP Visit:  2/7/2024  Referring Provider: Referring, Self    SUBJECTIVE     Chief Complaint   Patient presents with    Left Foot - Establish Care, Nail Problem    Right Foot - Establish Care, Nail Problem     HPI: Devi Bobo, a 78 y.o.female, comes to clinic.    New, Established, New Problem:  New  Location:  Toenails  Duration:   Greater than five years  Onset:  Gradual  Nature:  sore with palpation.  Stable, worsening, improving:   worsening  Aggravating factors:  Pain with shoe gear and ambulation.  Previous Treatment: Unable to trim their own toenails.    No other pedal complaints at this time.    Patient denies any fevers, chills, nausea, vomiting, shortness of breath, nor any other constitutional signs nor symptoms.       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)     Hypertension     Lesion of sciatic nerve, bilateral lower limbs     Osteoporosis     Overactive bladder     Piriformis syndrome     Polyp of colon     Postmenopausal atrophic vaginitis     Vaginal atrophy      Past Surgical History:   Procedure Laterality Date    COLONOSCOPY  2019    LEEP N/A 1/6/2022    Procedure: LOOP ELECTROCAUTERY EXCISION PROCEDURE;  Surgeon: El Álvarez MD;  Location: [facility] MAIN OR;  Service: Gynecology;  Laterality: N/A;    TUBAL ABDOMINAL LIGATION       Family History   Problem Relation Age of Onset    Thyroid disease Mother     Heart disease Mother     Heart disease Father     Thyroid disease Sister     Heart disease Sister     Colon cancer  Sister      Social History     Socioeconomic History    Marital status:     Number of children: 3    Years of education: 10   Tobacco Use    Smoking status: Never    Smokeless tobacco: Never    Tobacco comments:     current non smoker unknown if ever smoked   Vaping Use    Vaping status: Never Used   Substance and Sexual Activity    Alcohol use: Not Currently    Drug use: Not Currently    Sexual activity: Not Currently     Allergies   Allergen Reactions    Sulfa Antibiotics Anaphylaxis    Nitrofurantoin Confusion     Current Outpatient Medications   Medication Sig Dispense Refill    Calcium Carbonate-Vitamin D 600-200 MG-UNIT capsule Take 1 capsule by mouth Daily.      denosumab (Prolia) 60 MG/ML solution prefilled syringe syringe Inject 1 mL under the skin into the appropriate area as directed Every 6 (Six) Months. Last dose: 9/21/22      estradiol (ESTRACE) 0.1 MG/GM vaginal cream Insert 1 gram into the vagina 2-3 times a week 42 g 6    gabapentin (NEURONTIN) 600 MG tablet Take 1 tablet by mouth 3 (Three) Times a Day.      HYDROcodone-acetaminophen (NORCO) 5-325 MG per tablet Take 1 tablet by mouth Every 8 (Eight) Hours As Needed.      lisinopril (PRINIVIL,ZESTRIL) 10 MG tablet Take 1 tablet by mouth Daily.      multivitamin with minerals tablet tablet Take 1 tablet by mouth Daily.      triamcinolone (KENALOG) 0.1 % ointment Used twice daily for 2 weeks and then taper to once daily for 2 weeks.  If burning remains well controlled using every other day as needed. 30 g 2     No current facility-administered medications for this visit.     Review of Systems   Constitutional: Negative.    Skin:         Painful toenails.   All other systems reviewed and are negative.      OBJECTIVE     Vitals:    03/14/24 1308   BP: 157/90   Pulse: 96   Temp: 98.6 °F (37 °C)   SpO2: 96%       Patient seen in no apparent distress.      PHYSICAL EXAM:     Foot/Ankle Exam    GENERAL  Appearance:  elderly, chronically ill and  frail  Orientation:  AAOx3  Affect:  appropriate  Gait:  unimpaired  Assistance:  independent  Right shoe gear: casual shoe  Left shoe gear: casual shoe    VASCULAR     Right Foot Vascularity   Dorsalis pedis:  1+  Posterior tibial:  1+  Skin temperature:  warm  Edema grading:  None  CFT:  < 3 seconds  Pedal hair growth:  Absent  Varicosities:  moderate varicosities     Left Foot Vascularity   Dorsalis pedis:  1+  Posterior tibial:  1+  Skin temperature:  warm  Edema grading:  None  CFT:  < 3 seconds  Pedal hair growth:  Absent  Varicosities:  moderate varicosities     NEUROLOGIC     Right Foot Neurologic   Light touch sensation: diminished  Vibratory sensation: diminished  Hot/Cold sensation: diminished  Protective Sensation using [place] Monofilament:   Sites intact: 2  Sites tested: 10     Left Foot Neurologic   Light touch sensation: diminished  Vibratory sensation: diminished  Hot/Cold sensation:  diminished  Protective Sensation using [place] Monofilament:   Sites intact: 2  Sites tested: 10    MUSCLE STRENGTH     Right Foot Muscle Strength   Foot dorsiflexion:  4  Foot plantar flexion:  4  Foot inversion:  4  Foot eversion:  4     Left Foot Muscle Strength   Foot dorsiflexion:  4  Foot plantar flexion:  4  Foot inversion:  4  Foot eversion:  4    RANGE OF MOTION     Right Foot Range of Motion   Foot and ankle ROM within normal limits       Left Foot Range of Motion   Foot and ankle ROM within normal limits      DERMATOLOGIC      Right Foot Dermatologic   Skin  Right foot skin is intact.   Nails  1.  Positive for elongated, onychomycosis, abnormal thickness, subungual debris, dystrophic nail and ingrown toenail.  2.  Positive for elongated, onychomycosis, abnormal thickness, subungual debris, dystrophic nail and ingrown toenail.  3.  Positive for elongated, onychomycosis, abnormal thickness, subungual debris and ingrown toenail.  Nails comment:  Toenails 1, 2, 3, 4, and 5     Left Foot  Dermatologic   Skin  Left foot skin is intact.   Nails comment:  Toenails 1, 2, 3, 4, and 5  Nails  1.  Positive for elongated, onychomycosis, abnormal thickness, subungual debris, dystrophic nail and ingrown toenail.  2.  Positive for elongated, onychomycosis, abnormal thickness, subungual debris, dystrophic nail and ingrown toenail.  3.  Positive for elongated, onychomycosis, abnormal thickness, subungual debris and ingrown toenail.  4.  Positive for elongated, onychomycosis, abnormally thick, subungual debris and ingrown toenail.      ASSESSMENT/PLAN     Diagnoses and all orders for this visit:    1. Onychogryphosis (Primary)    2. Onychomycosis    3. Onychocryptosis    4. Foot pain, bilateral        Comprehensive lower extremity examination and evaluation was performed.    Discussed findings and treatment plan including risks, benefits, and treatment options with patient in detail. Patient agreed with treatment plan.    Toenails 1, 2, 3 on Right and 1, 2, 3, 4 on Left were debrided with nail nippers then filed with a Thomasmel nail layo.  Patient tolerated procedure well without complications.    An After Visit Summary was printed and given to the patient at discharge, including (if requested) any available informative/educational handouts regarding diagnosis, treatment, or medications. All questions were answered to patient/family satisfaction. Should symptoms fail to improve or worsen they agree to call or return to clinic or to go to the Emergency Department. Discussed the importance of following up with any needed screening tests/labs/specialist appointments and any requested follow-up recommended by me today. Importance of maintaining follow-up discussed and patient accepts that missed appointments can delay diagnosis and potentially lead to worsening of conditions.    Return in about 9 weeks (around 5/16/2024) for Toenail Care., or sooner if acute issues arise.    This document has been electronically  signed by Tavon Maza DPM on March 14, 2024 13:36 EDT

## 2024-04-15 ENCOUNTER — APPOINTMENT (OUTPATIENT)
Dept: GENERAL RADIOLOGY | Facility: HOSPITAL | Age: 79
End: 2024-04-15
Payer: MEDICARE

## 2024-04-15 ENCOUNTER — HOSPITAL ENCOUNTER (EMERGENCY)
Facility: HOSPITAL | Age: 79
Discharge: HOME | End: 2024-04-15
Attending: EMERGENCY MEDICINE | Admitting: EMERGENCY MEDICINE
Payer: MEDICARE

## 2024-04-15 VITALS
WEIGHT: 106.26 LBS | HEIGHT: 63 IN | HEART RATE: 94 BPM | TEMPERATURE: 98.7 F | DIASTOLIC BLOOD PRESSURE: 72 MMHG | RESPIRATION RATE: 16 BRPM | BODY MASS INDEX: 18.83 KG/M2 | SYSTOLIC BLOOD PRESSURE: 117 MMHG | OXYGEN SATURATION: 98 %

## 2024-04-15 DIAGNOSIS — R35.0 URINARY FREQUENCY: Primary | ICD-10-CM

## 2024-04-15 LAB
ALBUMIN SERPL-MCNC: 4.3 G/DL (ref 3.5–5.2)
ALBUMIN/GLOB SERPL: 2.2 G/DL
ALP SERPL-CCNC: 74 U/L (ref 39–117)
ALT SERPL W P-5'-P-CCNC: 9 U/L (ref 1–33)
ANION GAP SERPL CALCULATED.3IONS-SCNC: 9.3 MMOL/L (ref 5–15)
AST SERPL-CCNC: 23 U/L (ref 1–32)
BASOPHILS # BLD AUTO: 0.06 10*3/MM3 (ref 0–0.2)
BASOPHILS NFR BLD AUTO: 0.8 % (ref 0–1.5)
BILIRUB SERPL-MCNC: 0.3 MG/DL (ref 0–1.2)
BILIRUB UR QL STRIP: NEGATIVE
BUN SERPL-MCNC: 24 MG/DL (ref 8–23)
BUN/CREAT SERPL: 21.6 (ref 7–25)
CALCIUM SPEC-SCNC: 9.5 MG/DL (ref 8.6–10.5)
CHLORIDE SERPL-SCNC: 100 MMOL/L (ref 98–107)
CLARITY UR: CLEAR
CO2 SERPL-SCNC: 26.7 MMOL/L (ref 22–29)
COLOR UR: YELLOW
CREAT SERPL-MCNC: 1.11 MG/DL (ref 0.57–1)
DEPRECATED RDW RBC AUTO: 43.6 FL (ref 37–54)
EGFRCR SERPLBLD CKD-EPI 2021: 51.3 ML/MIN/1.73
EOSINOPHIL # BLD AUTO: 0.08 10*3/MM3 (ref 0–0.4)
EOSINOPHIL NFR BLD AUTO: 1.1 % (ref 0.3–6.2)
ERYTHROCYTE [DISTWIDTH] IN BLOOD BY AUTOMATED COUNT: 13.2 % (ref 12.3–15.4)
GLOBULIN UR ELPH-MCNC: 2 GM/DL
GLUCOSE SERPL-MCNC: 101 MG/DL (ref 65–99)
GLUCOSE UR STRIP-MCNC: NEGATIVE MG/DL
HCT VFR BLD AUTO: 33.1 % (ref 34–46.6)
HGB BLD-MCNC: 10.4 G/DL (ref 12–15.9)
HGB UR QL STRIP.AUTO: NEGATIVE
HOLD SPECIMEN: NORMAL
HOLD SPECIMEN: NORMAL
IMM GRANULOCYTES # BLD AUTO: 0.02 10*3/MM3 (ref 0–0.05)
IMM GRANULOCYTES NFR BLD AUTO: 0.3 % (ref 0–0.5)
KETONES UR QL STRIP: NEGATIVE
LEUKOCYTE ESTERASE UR QL STRIP.AUTO: NEGATIVE
LYMPHOCYTES # BLD AUTO: 0.87 10*3/MM3 (ref 0.7–3.1)
LYMPHOCYTES NFR BLD AUTO: 11.9 % (ref 19.6–45.3)
MAGNESIUM SERPL-MCNC: 2.3 MG/DL (ref 1.6–2.4)
MCH RBC QN AUTO: 28.7 PG (ref 26.6–33)
MCHC RBC AUTO-ENTMCNC: 31.4 G/DL (ref 31.5–35.7)
MCV RBC AUTO: 91.2 FL (ref 79–97)
MONOCYTES # BLD AUTO: 0.61 10*3/MM3 (ref 0.1–0.9)
MONOCYTES NFR BLD AUTO: 8.3 % (ref 5–12)
NEUTROPHILS NFR BLD AUTO: 5.69 10*3/MM3 (ref 1.7–7)
NEUTROPHILS NFR BLD AUTO: 77.6 % (ref 42.7–76)
NITRITE UR QL STRIP: NEGATIVE
NRBC BLD AUTO-RTO: 0 /100 WBC (ref 0–0.2)
PH UR STRIP.AUTO: 7.5 [PH] (ref 5–8)
PLATELET # BLD AUTO: 248 10*3/MM3 (ref 140–450)
PMV BLD AUTO: 10.2 FL (ref 6–12)
POTASSIUM SERPL-SCNC: 4.8 MMOL/L (ref 3.5–5.2)
PROT SERPL-MCNC: 6.3 G/DL (ref 6–8.5)
PROT UR QL STRIP: NEGATIVE
RBC # BLD AUTO: 3.63 10*6/MM3 (ref 3.77–5.28)
SODIUM SERPL-SCNC: 136 MMOL/L (ref 136–145)
SP GR UR STRIP: 1.01 (ref 1–1.03)
TROPONIN T SERPL HS-MCNC: 21 NG/L
UROBILINOGEN UR QL STRIP: NORMAL
WBC NRBC COR # BLD AUTO: 7.33 10*3/MM3 (ref 3.4–10.8)
WHOLE BLOOD HOLD COAG: NORMAL
WHOLE BLOOD HOLD SPECIMEN: NORMAL

## 2024-04-15 PROCEDURE — 99284 EMERGENCY DEPT VISIT MOD MDM: CPT

## 2024-04-15 PROCEDURE — 81003 URINALYSIS AUTO W/O SCOPE: CPT | Performed by: EMERGENCY MEDICINE

## 2024-04-15 PROCEDURE — 84484 ASSAY OF TROPONIN QUANT: CPT

## 2024-04-15 PROCEDURE — 85025 COMPLETE CBC W/AUTO DIFF WBC: CPT

## 2024-04-15 PROCEDURE — 93005 ELECTROCARDIOGRAM TRACING: CPT

## 2024-04-15 PROCEDURE — 71045 X-RAY EXAM CHEST 1 VIEW: CPT

## 2024-04-15 PROCEDURE — 83735 ASSAY OF MAGNESIUM: CPT

## 2024-04-15 PROCEDURE — 80053 COMPREHEN METABOLIC PANEL: CPT

## 2024-04-15 PROCEDURE — 36415 COLL VENOUS BLD VENIPUNCTURE: CPT

## 2024-04-15 PROCEDURE — 93005 ELECTROCARDIOGRAM TRACING: CPT | Performed by: EMERGENCY MEDICINE

## 2024-04-15 RX ORDER — SODIUM CHLORIDE 0.9 % (FLUSH) 0.9 %
10 SYRINGE (ML) INJECTION AS NEEDED
Status: DISCONTINUED | OUTPATIENT
Start: 2024-04-15 | End: 2024-04-15 | Stop reason: HOSPADM

## 2024-04-15 NOTE — ED PROVIDER NOTES
Time: 2:57 PM EDT  Date of encounter:  4/15/2024  Independent Historian/Clinical History and Information was obtained by:   Patient    History is limited by: N/A    Chief Complaint: Weakness      History of Present Illness:  Patient is a 79 y.o. year old female who presents to the emergency department for evaluation of allover weakness and vaginal pain for the past couple days.  Patient is concerned she has a UTI but denies dysuria, hematuria, nausea, vomiting, fever.    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)     Hypertension     Lesion of sciatic nerve, bilateral lower limbs     Osteoporosis     Overactive bladder     Piriformis syndrome     Polyp of colon     Postmenopausal atrophic vaginitis     Vaginal atrophy      Past Surgical History:   Procedure Laterality Date    COLONOSCOPY  2019    LEEP N/A 1/6/2022    Procedure: LOOP ELECTROCAUTERY EXCISION PROCEDURE;  Surgeon: El Álvarez MD;  Location: [facility] MAIN OR;  Service: Gynecology;  Laterality: N/A;    TUBAL ABDOMINAL LIGATION       Family History   Problem Relation Age of Onset    Thyroid disease Mother     Heart disease Mother     Heart disease Father     Thyroid disease Sister     Heart disease Sister     Colon cancer Sister        Home Medications:  Prior to Admission medications    Medication Sig Start Date End Date Taking? Authorizing Provider   Calcium Carbonate-Vitamin D 600-200 MG-UNIT capsule Take 1 capsule by mouth Daily.    Provider, Historical,  MD   denosumab (Prolia) 60 MG/ML solution prefilled syringe syringe Inject 1 mL under the skin into the appropriate area as directed Every 6 (Six) Months. Last dose: 9/21/22    Nai Clements MD   estradiol (ESTRACE) 0.1 MG/GM vaginal cream Insert 1 gram into the vagina 2-3 times a week 1/12/24   Sanjiv Kelly, DEMETRA   gabapentin (NEURONTIN) 600 MG tablet Take 1 tablet by mouth 3 (Three) Times a Day. 9/4/21   Nai Clements MD   HYDROcodone-acetaminophen (NORCO) 5-325 MG per tablet Take 1 tablet by mouth Every 8 (Eight) Hours As Needed. 10/11/22   Nai Clements MD   lisinopril (PRINIVIL,ZESTRIL) 10 MG tablet Take 1 tablet by mouth Daily.    Nai Clements MD   multivitamin with minerals tablet tablet Take 1 tablet by mouth Daily.    Nai Clements MD   triamcinolone (KENALOG) 0.1 % ointment Used twice daily for 2 weeks and then taper to once daily for 2 weeks.  If burning remains well controlled using every other day as needed. 8/8/23   Sanjiv Kelly, DEMETRA        Social History:   Social History     Tobacco Use    Smoking status: Never    Smokeless tobacco: Never    Tobacco comments:     current non smoker unknown if ever smoked   Vaping Use    Vaping status: Never Used   Substance Use Topics    Alcohol use: Not Currently    Drug use: Not Currently         Review of Systems:  Review of Systems   Constitutional:  Negative for chills and fever.   HENT:  Negative for congestion, rhinorrhea and sore throat.    Eyes:  Negative for pain and visual disturbance.   Respiratory:  Negative for apnea, cough, chest tightness and shortness of breath.    Cardiovascular:  Negative for chest pain and palpitations.   Gastrointestinal:  Negative for abdominal pain, diarrhea, nausea and vomiting.   Genitourinary:  Positive for vaginal pain. Negative for difficulty urinating, dysuria and hematuria.   Musculoskeletal:  Negative for joint swelling and myalgias.   Skin:  Negative for color change.  "  Neurological:  Positive for weakness. Negative for seizures and headaches.   Psychiatric/Behavioral: Negative.     All other systems reviewed and are negative.       Physical Exam:  /72 (BP Location: Right arm, Patient Position: Lying)   Pulse 94   Temp 98.7 °F (37.1 °C) (Oral)   Resp 16   Ht 160 cm (63\")   Wt 48.2 kg (106 lb 4.2 oz)   SpO2 98%   BMI 18.82 kg/m²     Physical Exam  Vitals and nursing note reviewed.   Constitutional:       General: She is not in acute distress.     Appearance: Normal appearance. She is not toxic-appearing.   HENT:      Head: Normocephalic and atraumatic.      Jaw: There is normal jaw occlusion.      Mouth/Throat:      Mouth: Mucous membranes are moist.   Eyes:      General: Lids are normal.      Extraocular Movements: Extraocular movements intact.      Conjunctiva/sclera: Conjunctivae normal.      Pupils: Pupils are equal, round, and reactive to light.   Cardiovascular:      Rate and Rhythm: Normal rate and regular rhythm.      Pulses: Normal pulses.      Heart sounds: Normal heart sounds.   Pulmonary:      Effort: Pulmonary effort is normal. No respiratory distress.      Breath sounds: Normal breath sounds. No wheezing or rhonchi.   Abdominal:      General: Abdomen is flat. There is no distension.      Palpations: Abdomen is soft.      Tenderness: There is no abdominal tenderness. There is no guarding or rebound.   Musculoskeletal:         General: Normal range of motion.      Cervical back: Normal range of motion and neck supple.      Right lower leg: No edema.      Left lower leg: No edema.   Skin:     General: Skin is warm and dry.   Neurological:      General: No focal deficit present.      Mental Status: She is alert and oriented to person, place, and time. Mental status is at baseline.   Psychiatric:         Mood and Affect: Mood normal.         Behavior: Behavior normal.                  Procedures:  Procedures      Medical Decision Making:      Comorbidities " that affect care:    Hypertension, osteoporosis    External Notes reviewed:    None      The following orders were placed and all results were independently analyzed by me:  Orders Placed This Encounter   Procedures    XR Chest 1 View    [place] Draw    Comprehensive Metabolic Panel    Single High Sensitivity Troponin T    Magnesium    Urinalysis With Microscopic If Indicated (No Culture) - Urine, Clean Catch    CBC Auto Differential    Undress & Gown    Continuous Pulse Oximetry    Vital Signs    ECG 12 Lead ED Triage Standing Order; Weak / Dizzy / AMS    CBC & Differential    Green Top (Gel)    Lavender Top    Gold Top - SST    Light Blue Top       Medications Given in the Emergency Department:  Medications - No data to display       ED Course:    ED Course as of 04/15/24 2223   Mon Apr 15, 2024   1458 --- PROVIDER IN TRIAGE NOTE ---    The patient was evaluated by meMeño in triage. Orders were placed and the patient is currently awaiting disposition.    [AJ]      ED Course User Index  [AJ] Meño Jackson PA-C       Labs:    Lab Results (last 24 hours)       Procedure Component Value Units Date/Time    CBC & Differential [[id number]]  (Abnormal) Collected: 04/15/24 1502    Specimen: Blood from Arm, Right Updated: 04/15/24 1536    Narrative:      The following orders were created for panel order CBC & Differential.  Procedure                               Abnormality         Status                     ---------                               -----------         ------                     CBC Auto Differential[[id number]]        Abnormal            Final result                 Please view results for these tests on the individual orders.    Comprehensive Metabolic Panel [[id number]]  (Abnormal) Collected: 04/15/24 1502    Specimen: Blood from Arm, Right Updated: 04/15/24 1557     Glucose 101 mg/dL      BUN 24 mg/dL      Creatinine 1.11 mg/dL      Sodium 136 mmol/L      Potassium 4.8 mmol/L       Chloride 100 mmol/L      CO2 26.7 mmol/L      Calcium 9.5 mg/dL      Total Protein 6.3 g/dL      Albumin 4.3 g/dL      ALT (SGPT) 9 U/L      AST (SGOT) 23 U/L      Alkaline Phosphatase 74 U/L      Total Bilirubin 0.3 mg/dL      Globulin 2.0 gm/dL      A/G Ratio 2.2 g/dL      BUN/Creatinine Ratio 21.6     Anion Gap 9.3 mmol/L      eGFR 51.3 mL/min/1.73     Narrative:      GFR Normal >60  Chronic Kidney Disease <60  Kidney Failure <15    The GFR formula is only valid for adults with stable renal function between ages 18 and 70.    Single High Sensitivity Troponin T [[id number]]  (Abnormal) Collected: 04/15/24 1502    Specimen: Blood from Arm, Right Updated: 04/15/24 1557     HS Troponin T 21 ng/L     Narrative:      High Sensitive Troponin T Reference Range:  <14.0 ng/L- Negative Female for AMI  <22.0 ng/L- Negative Male for AMI  >=14 - Abnormal Female indicating possible myocardial injury.  >=22 - Abnormal Male indicating possible myocardial injury.   Clinicians would have to utilize clinical acumen, EKG, Troponin, and serial changes to determine if it is an Acute Myocardial Infarction or myocardial injury due to an underlying chronic condition.         Magnesium [[id number]]  (Normal) Collected: 04/15/24 1502    Specimen: Blood from Arm, Right Updated: 04/15/24 1557     Magnesium 2.3 mg/dL     CBC Auto Differential [[id number]]  (Abnormal) Collected: 04/15/24 1502    Specimen: Blood from Arm, Right Updated: 04/15/24 1536     WBC 7.33 10*3/mm3      RBC 3.63 10*6/mm3      Hemoglobin 10.4 g/dL      Hematocrit 33.1 %      MCV 91.2 fL      MCH 28.7 pg      MCHC 31.4 g/dL      RDW 13.2 %      RDW-SD 43.6 fl      MPV 10.2 fL      Platelets 248 10*3/mm3      Neutrophil % 77.6 %      Lymphocyte % 11.9 %      Monocyte % 8.3 %      Eosinophil % 1.1 %      Basophil % 0.8 %      Immature Grans % 0.3 %      Neutrophils, Absolute 5.69 10*3/mm3      Lymphocytes, Absolute 0.87 10*3/mm3      Monocytes, Absolute 0.61 10*3/mm3       Eosinophils, Absolute 0.08 10*3/mm3      Basophils, Absolute 0.06 10*3/mm3      Immature Grans, Absolute 0.02 10*3/mm3      nRBC 0.0 /100 WBC     Urinalysis With Microscopic If Indicated (No Culture) - Urine, Clean Catch [[id number]]  (Normal) Collected: 04/15/24 1647    Specimen: Urine, Clean Catch Updated: 04/15/24 1659     Color, UA Yellow     Appearance, UA Clear     pH, UA 7.5     Specific Gravity, UA 1.006     Glucose, UA Negative     Ketones, UA Negative     Bilirubin, UA Negative     Blood, UA Negative     Protein, UA Negative     Leuk Esterase, UA Negative     Nitrite, UA Negative     Urobilinogen, UA 0.2 E.U./dL    Narrative:      Urine microscopic not indicated.             Imaging:    XR Chest 1 View    Result Date: 4/15/2024  XR CHEST 1 VW-  Date of Exam: 4/15/2024 3:24 PM  Indication: Weak/Dizzy/AMS triage protocol  Comparison: 5/28/2023.  Findings: The heart size is normal. The pulmonary vascular markings are normal. There is mild scarring in the right upper lobe. The lungs and pleural spaces are otherwise clear. There are chronic age-related changes involving the bony thorax.      Impression:  1. Mild scarring in the right upper lobe. 2. No active pulmonary disease.   Electronically Signed By-Favian Bansal MD On:4/15/2024 3:48 PM         Differential Diagnosis and Discussion:    Dysuria: Differential diagnosis includes but is not limited to urethritis, cystitis, pyelonephritis, ureteral calculi, neoplasm, chemical irritant, urethral stricture, and trauma    All labs were reviewed and interpreted by me.    MDM  Number of Diagnoses or Management Options  Urinary frequency  Diagnosis management comments: In summary this is a 79-year-old female who presents to the emergency department for evaluation of dysuria.  She believes she has a UTI for the symptoms.  CBC independently reviewed by me and shows no critical abnormalities.  CMP independently reviewed by me and shows no critical abnormalities.  Urinalysis  negative for infection.  Patient is otherwise well-appearing in no acute distress.  Very strict return to ER and follow-up instructions have been provided to the patient.                   Patient Care Considerations:    CT ABDOMEN AND PELVIS: I considered ordering a CT scan of the abdomen and pelvis however benign abdominal examination      Consultants/Shared Management Plan:    None    Social Determinants of Health:    Patient is independent, reliable, and has access to care.       Disposition and Care Coordination:    Discharged: The patient is suitable and stable for discharge with no need for consideration of admission.    I have explained the patient´s condition, diagnoses and treatment plan based on the information available to me at this time. I have answered questions and addressed any concerns. The patient has a good  understanding of the patient´s diagnosis, condition, and treatment plan as can be expected at this point. The vital signs have been stable. The patient´s condition is stable and appropriate for discharge from the emergency department.      The patient will pursue further outpatient evaluation with the primary care physician or other designated or consulting physician as outlined in the discharge instructions. They are agreeable to this plan of care and follow-up instructions have been explained in detail. The patient has received these instructions in written format and has expressed an understanding of the discharge instructions. The patient is aware that any significant change in condition or worsening of symptoms should prompt an immediate return to this or the closest emergency department or call to 911.  I have explained discharge medications and the need for follow up with the patient/caretakers. This was also printed in the discharge instructions. Patient was discharged with the following medications and follow up:      Medication List      No changes were made to your prescriptions  during this visit.      Raad Del Toro MD  [address] [facility] [address]  [address]  [facility] [address]  [phone]    In 1 week      El Samson MD  [address] [facility] [address]  [place] [address]  [phone]    In 1 week         Final diagnoses:   Urinary frequency        ED Disposition       ED Disposition   Discharge    Condition   Stable    Comment   --               This medical record created using voice recognition software.             Braxton Paulson MD  04/15/24 6615

## 2024-04-15 NOTE — ED PROVIDER NOTES
Time: 5:22 PM EDT  Date of encounter:  4/15/2024  Independent Historian/Clinical History and Information was obtained by:   Patient    History is limited by: N/A    Chief Complaint: Weakness generalized.  Dysuria and vaginal pain      History of Present Illness:  Patient is a 79 y.o. year old female who presents to the emergency department for evaluation of neurolysed weakness, Dysuria and vaginal pain.  Patient states that she is experiencing some vaginal pain that occurred 2 to 3 days ago.  She states is happens to her often and it comes and goes, she is concerned about a potential UTI.  She uses estrogen cream but states she has not used it in a while.  She denies any itchiness.  Patient states she feels she has a generalized weakness since having COVID.    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)     Hypertension     Lesion of sciatic nerve, bilateral lower limbs     Osteoporosis     Overactive bladder     Piriformis syndrome     Polyp of colon     Postmenopausal atrophic vaginitis     Vaginal atrophy      Past Surgical History:   Procedure Laterality Date    COLONOSCOPY  2019    LEEP N/A 1/6/2022    Procedure: LOOP ELECTROCAUTERY EXCISION PROCEDURE;  Surgeon: El Álvarez MD;  Location: [facility] MAIN OR;  Service: Gynecology;  Laterality: N/A;    TUBAL ABDOMINAL LIGATION       Family History   Problem Relation Age of Onset    Thyroid disease Mother     Heart disease Mother     Heart disease Father     Thyroid  disease Sister     Heart disease Sister     Colon cancer Sister        Home Medications:  Prior to Admission medications    Medication Sig Start Date End Date Taking? Authorizing Provider   Calcium Carbonate-Vitamin D 600-200 MG-UNIT capsule Take 1 capsule by mouth Daily.   Yes Nai Clements MD   gabapentin (NEURONTIN) 600 MG tablet Take 1 tablet by mouth 3 (Three) Times a Day. 9/4/21  Yes Nai Clements MD   HYDROcodone-acetaminophen (NORCO) 5-325 MG per tablet Take 1 tablet by mouth Every 8 (Eight) Hours As Needed. 10/11/22  Yes Nai Clements MD   lisinopril (PRINIVIL,ZESTRIL) 10 MG tablet Take 1 tablet by mouth Daily.   Yes Nai Clements MD   multivitamin with minerals tablet tablet Take 1 tablet by mouth Daily.   Yes Nai Clements MD   denosumab (Prolia) 60 MG/ML solution prefilled syringe syringe Inject 1 mL under the skin into the appropriate area as directed Every 6 (Six) Months. Last dose: 9/21/22    Nai Clements MD   estradiol (ESTRACE) 0.1 MG/GM vaginal cream Insert 1 gram into the vagina 2-3 times a week 1/12/24   Sanjiv Kelly APRN   triamcinolone (KENALOG) 0.1 % ointment Used twice daily for 2 weeks and then taper to once daily for 2 weeks.  If burning remains well controlled using every other day as needed. 8/8/23   Sanjiv Kelly APRN        Social History:   Social History     Tobacco Use    Smoking status: Never    Smokeless tobacco: Never    Tobacco comments:     current non smoker unknown if ever smoked   Vaping Use    Vaping status: Never Used   Substance Use Topics    Alcohol use: Not Currently    Drug use: Not Currently         Review of Systems:  Review of Systems   Constitutional:  Negative for chills and fever.   HENT:  Negative for congestion and sore throat.    Eyes:  Negative for visual disturbance.   Respiratory:  Negative for cough, chest tightness, shortness of breath and wheezing.    Cardiovascular:  Negative for chest pain and  "palpitations.   Gastrointestinal:  Negative for abdominal pain, constipation, diarrhea and nausea.   Endocrine: Negative for polydipsia and polyuria.   Genitourinary:  Positive for dysuria, frequency and vaginal pain. Negative for flank pain and pelvic pain.   Musculoskeletal:  Negative for back pain and neck stiffness.   Skin:  Negative for pallor and rash.   Neurological:  Positive for weakness. Negative for dizziness, light-headedness, numbness and headaches.   Psychiatric/Behavioral:  Negative for confusion.         Physical Exam:  /72 (BP Location: Right arm, Patient Position: Lying)   Pulse 94   Temp 98.7 °F (37.1 °C) (Oral)   Resp 16   Ht 160 cm (63\")   Wt 48.2 kg (106 lb 4.2 oz)   SpO2 98%   BMI 18.82 kg/m²     Physical Exam  Constitutional:       General: She is not in acute distress.     Appearance: She is not ill-appearing.   HENT:      Head: Normocephalic.   Eyes:      Pupils: Pupils are equal, round, and reactive to light.   Cardiovascular:      Rate and Rhythm: Normal rate and regular rhythm.   Pulmonary:      Effort: Pulmonary effort is normal. No respiratory distress.      Breath sounds: Normal breath sounds. No wheezing.   Abdominal:      General: Abdomen is flat. There is no distension.      Palpations: Abdomen is soft.      Tenderness: There is no abdominal tenderness.   Musculoskeletal:         General: Normal range of motion.      Cervical back: Normal range of motion.   Skin:     General: Skin is warm and dry.   Neurological:      Mental Status: She is alert and oriented to person, place, and time.      Sensory: No sensory deficit.      Motor: No weakness.   Psychiatric:         Mood and Affect: Mood normal.         Behavior: Behavior normal.                Procedures:  Procedures      Medical Decision Making:      Comorbidities that affect care:    {Comorbidities that affect care:02485}    External Notes reviewed:    {External Note review (Optional):01057}      The following " orders were placed and all results were independently analyzed by me:  Orders Placed This Encounter   Procedures    XR Chest 1 View    [place] Draw    Comprehensive Metabolic Panel    Single High Sensitivity Troponin T    Magnesium    Urinalysis With Microscopic If Indicated (No Culture) - Urine, Clean Catch    CBC Auto Differential    NPO Diet NPO Type: Strict NPO    Undress & Gown    Continuous Pulse Oximetry    Vital Signs    Orthostatic Blood Pressure    Oxygen Therapy- Nasal Cannula; Titrate 1-6 LPM Per SpO2; 90 - 95%    POC Glucose Once    ECG 12 Lead ED Triage Standing Order; Weak / Dizzy / AMS    Insert Peripheral IV    Fall Precautions    CBC & Differential    Green Top (Gel)    Lavender Top    Gold Top - SST    Light Blue Top       Medications Given in the Emergency Department:  Medications   sodium chloride 0.9 % flush 10 mL (has no administration in time range)        ED Course:    ED Course as of 04/15/24 1722   Mon Apr 15, 2024   1458 --- PROVIDER IN TRIAGE NOTE ---    The patient was evaluated by Meño lofton in triage. Orders were placed and the patient is currently awaiting disposition.    [AJ]      ED Course User Index  [AJ] Meño Jackson, SAURABH       Labs:    Lab Results (last 24 hours)       Procedure Component Value Units Date/Time    CBC & Differential [[id number]]  (Abnormal) Collected: 04/15/24 1502    Specimen: Blood from Arm, Right Updated: 04/15/24 1536    Narrative:      The following orders were created for panel order CBC & Differential.  Procedure                               Abnormality         Status                     ---------                               -----------         ------                     CBC Auto Differential[[id number]]        Abnormal            Final result                 Please view results for these tests on the individual orders.    Comprehensive Metabolic Panel [[id number]]  (Abnormal) Collected: 04/15/24 1502    Specimen: Blood from Arm, Right  Updated: 04/15/24 1557     Glucose 101 mg/dL      BUN 24 mg/dL      Creatinine 1.11 mg/dL      Sodium 136 mmol/L      Potassium 4.8 mmol/L      Chloride 100 mmol/L      CO2 26.7 mmol/L      Calcium 9.5 mg/dL      Total Protein 6.3 g/dL      Albumin 4.3 g/dL      ALT (SGPT) 9 U/L      AST (SGOT) 23 U/L      Alkaline Phosphatase 74 U/L      Total Bilirubin 0.3 mg/dL      Globulin 2.0 gm/dL      A/G Ratio 2.2 g/dL      BUN/Creatinine Ratio 21.6     Anion Gap 9.3 mmol/L      eGFR 51.3 mL/min/1.73     Narrative:      GFR Normal >60  Chronic Kidney Disease <60  Kidney Failure <15    The GFR formula is only valid for adults with stable renal function between ages 18 and 70.    Single High Sensitivity Troponin T [[id number]]  (Abnormal) Collected: 04/15/24 1502    Specimen: Blood from Arm, Right Updated: 04/15/24 1557     HS Troponin T 21 ng/L     Narrative:      High Sensitive Troponin T Reference Range:  <14.0 ng/L- Negative Female for AMI  <22.0 ng/L- Negative Male for AMI  >=14 - Abnormal Female indicating possible myocardial injury.  >=22 - Abnormal Male indicating possible myocardial injury.   Clinicians would have to utilize clinical acumen, EKG, Troponin, and serial changes to determine if it is an Acute Myocardial Infarction or myocardial injury due to an underlying chronic condition.         Magnesium [[id number]]  (Normal) Collected: 04/15/24 1502    Specimen: Blood from Arm, Right Updated: 04/15/24 1557     Magnesium 2.3 mg/dL     CBC Auto Differential [[id number]]  (Abnormal) Collected: 04/15/24 1502    Specimen: Blood from Arm, Right Updated: 04/15/24 1536     WBC 7.33 10*3/mm3      RBC 3.63 10*6/mm3      Hemoglobin 10.4 g/dL      Hematocrit 33.1 %      MCV 91.2 fL      MCH 28.7 pg      MCHC 31.4 g/dL      RDW 13.2 %      RDW-SD 43.6 fl      MPV 10.2 fL      Platelets 248 10*3/mm3      Neutrophil % 77.6 %      Lymphocyte % 11.9 %      Monocyte % 8.3 %      Eosinophil % 1.1 %      Basophil % 0.8 %      Immature  Grans % 0.3 %      Neutrophils, Absolute 5.69 10*3/mm3      Lymphocytes, Absolute 0.87 10*3/mm3      Monocytes, Absolute 0.61 10*3/mm3      Eosinophils, Absolute 0.08 10*3/mm3      Basophils, Absolute 0.06 10*3/mm3      Immature Grans, Absolute 0.02 10*3/mm3      nRBC 0.0 /100 WBC     Urinalysis With Microscopic If Indicated (No Culture) - Urine, Clean Catch [[id number]]  (Normal) Collected: 04/15/24 1647    Specimen: Urine, Clean Catch Updated: 04/15/24 1659     Color, UA Yellow     Appearance, UA Clear     pH, UA 7.5     Specific Gravity, UA 1.006     Glucose, UA Negative     Ketones, UA Negative     Bilirubin, UA Negative     Blood, UA Negative     Protein, UA Negative     Leuk Esterase, UA Negative     Nitrite, UA Negative     Urobilinogen, UA 0.2 E.U./dL    Narrative:      Urine microscopic not indicated.             Imaging:    XR Chest 1 View    Result Date: 4/15/2024  XR CHEST 1 VW-  Date of Exam: 4/15/2024 3:24 PM  Indication: Weak/Dizzy/AMS triage protocol  Comparison: 5/28/2023.  Findings: The heart size is normal. The pulmonary vascular markings are normal. There is mild scarring in the right upper lobe. The lungs and pleural spaces are otherwise clear. There are chronic age-related changes involving the bony thorax.      Impression:  1. Mild scarring in the right upper lobe. 2. No active pulmonary disease.   Electronically Signed By-Favian Bansal MD On:4/15/2024 3:48 PM         Differential Diagnosis and Discussion:    {Differentials:40108}    {Independent Review of (Optional):71196}    MDM  Number of Diagnoses or Management Options       {Critical Care:19159}    {SEPSIS RECOGNITION:29411}    Patient Care Considerations:    {Considerations (Optional):11055}      Consultants/Shared Management Plan:    {Shared Management Plan (Optional):17795}    Social Determinants of Health:    {Social Determinants of Health (Optional):28616}      Disposition and Care Coordination:    {Admission  consideration:58191}    {Discharge (Optional):03495}    Final diagnoses:   None        ED Disposition       None            This medical record created using voice recognition software.

## 2024-04-20 LAB
QT INTERVAL: 378 MS
QTC INTERVAL: 438 MS

## 2024-04-29 ENCOUNTER — APPOINTMENT (OUTPATIENT)
Dept: GENERAL RADIOLOGY | Facility: HOSPITAL | Age: 79
End: 2024-04-29
Payer: MEDICARE

## 2024-04-29 ENCOUNTER — APPOINTMENT (OUTPATIENT)
Dept: CT IMAGING | Facility: HOSPITAL | Age: 79
End: 2024-04-29
Payer: MEDICARE

## 2024-04-29 ENCOUNTER — HOSPITAL ENCOUNTER (EMERGENCY)
Facility: HOSPITAL | Age: 79
Discharge: HOME | End: 2024-04-29
Attending: EMERGENCY MEDICINE | Admitting: EMERGENCY MEDICINE
Payer: MEDICARE

## 2024-04-29 VITALS
DIASTOLIC BLOOD PRESSURE: 83 MMHG | BODY MASS INDEX: 18.28 KG/M2 | RESPIRATION RATE: 16 BRPM | OXYGEN SATURATION: 99 % | WEIGHT: 103.17 LBS | HEART RATE: 82 BPM | SYSTOLIC BLOOD PRESSURE: 142 MMHG | TEMPERATURE: 98.5 F

## 2024-04-29 DIAGNOSIS — R53.1 WEAKNESS: Primary | ICD-10-CM

## 2024-04-29 LAB
ALBUMIN SERPL-MCNC: 4 G/DL (ref 3.5–5.2)
ALBUMIN/GLOB SERPL: 1.6 G/DL
ALP SERPL-CCNC: 77 U/L (ref 39–117)
ALT SERPL W P-5'-P-CCNC: 15 U/L (ref 1–33)
ANION GAP SERPL CALCULATED.3IONS-SCNC: 11.3 MMOL/L (ref 5–15)
AST SERPL-CCNC: 29 U/L (ref 1–32)
BASOPHILS # BLD AUTO: 0.06 10*3/MM3 (ref 0–0.2)
BASOPHILS NFR BLD AUTO: 0.6 % (ref 0–1.5)
BILIRUB SERPL-MCNC: 0.2 MG/DL (ref 0–1.2)
BILIRUB UR QL STRIP: NEGATIVE
BUN SERPL-MCNC: 27 MG/DL (ref 8–23)
BUN/CREAT SERPL: 25.7 (ref 7–25)
CALCIUM SPEC-SCNC: 9 MG/DL (ref 8.6–10.5)
CHLORIDE SERPL-SCNC: 105 MMOL/L (ref 98–107)
CLARITY UR: CLEAR
CO2 SERPL-SCNC: 22.7 MMOL/L (ref 22–29)
COLOR UR: YELLOW
CREAT SERPL-MCNC: 1.05 MG/DL (ref 0.57–1)
DEPRECATED RDW RBC AUTO: 45.7 FL (ref 37–54)
EGFRCR SERPLBLD CKD-EPI 2021: 54.8 ML/MIN/1.73
EOSINOPHIL # BLD AUTO: 0.02 10*3/MM3 (ref 0–0.4)
EOSINOPHIL NFR BLD AUTO: 0.2 % (ref 0.3–6.2)
ERYTHROCYTE [DISTWIDTH] IN BLOOD BY AUTOMATED COUNT: 13.2 % (ref 12.3–15.4)
GLOBULIN UR ELPH-MCNC: 2.5 GM/DL
GLUCOSE SERPL-MCNC: 113 MG/DL (ref 65–99)
GLUCOSE UR STRIP-MCNC: NEGATIVE MG/DL
HCT VFR BLD AUTO: 33.3 % (ref 34–46.6)
HGB BLD-MCNC: 10.7 G/DL (ref 12–15.9)
HGB UR QL STRIP.AUTO: NEGATIVE
HOLD SPECIMEN: NORMAL
HOLD SPECIMEN: NORMAL
IMM GRANULOCYTES # BLD AUTO: 0.03 10*3/MM3 (ref 0–0.05)
IMM GRANULOCYTES NFR BLD AUTO: 0.3 % (ref 0–0.5)
KETONES UR QL STRIP: NEGATIVE
LEUKOCYTE ESTERASE UR QL STRIP.AUTO: NEGATIVE
LYMPHOCYTES # BLD AUTO: 0.56 10*3/MM3 (ref 0.7–3.1)
LYMPHOCYTES NFR BLD AUTO: 5.4 % (ref 19.6–45.3)
MAGNESIUM SERPL-MCNC: 2.3 MG/DL (ref 1.6–2.4)
MCH RBC QN AUTO: 29.6 PG (ref 26.6–33)
MCHC RBC AUTO-ENTMCNC: 32.1 G/DL (ref 31.5–35.7)
MCV RBC AUTO: 92.2 FL (ref 79–97)
MONOCYTES # BLD AUTO: 0.62 10*3/MM3 (ref 0.1–0.9)
MONOCYTES NFR BLD AUTO: 6 % (ref 5–12)
NEUTROPHILS NFR BLD AUTO: 87.5 % (ref 42.7–76)
NEUTROPHILS NFR BLD AUTO: 9.13 10*3/MM3 (ref 1.7–7)
NITRITE UR QL STRIP: NEGATIVE
NRBC BLD AUTO-RTO: 0 /100 WBC (ref 0–0.2)
PH UR STRIP.AUTO: 7.5 [PH] (ref 5–8)
PLATELET # BLD AUTO: 241 10*3/MM3 (ref 140–450)
PMV BLD AUTO: 10.4 FL (ref 6–12)
POTASSIUM SERPL-SCNC: 4.4 MMOL/L (ref 3.5–5.2)
PROT SERPL-MCNC: 6.5 G/DL (ref 6–8.5)
PROT UR QL STRIP: NEGATIVE
RBC # BLD AUTO: 3.61 10*6/MM3 (ref 3.77–5.28)
SODIUM SERPL-SCNC: 139 MMOL/L (ref 136–145)
SP GR UR STRIP: 1.01 (ref 1–1.03)
TROPONIN T SERPL HS-MCNC: 28 NG/L
UROBILINOGEN UR QL STRIP: NORMAL
WBC NRBC COR # BLD AUTO: 10.42 10*3/MM3 (ref 3.4–10.8)
WHOLE BLOOD HOLD COAG: NORMAL
WHOLE BLOOD HOLD SPECIMEN: NORMAL

## 2024-04-29 PROCEDURE — 71045 X-RAY EXAM CHEST 1 VIEW: CPT

## 2024-04-29 PROCEDURE — 99284 EMERGENCY DEPT VISIT MOD MDM: CPT

## 2024-04-29 PROCEDURE — 70450 CT HEAD/BRAIN W/O DYE: CPT

## 2024-04-29 PROCEDURE — 80053 COMPREHEN METABOLIC PANEL: CPT | Performed by: EMERGENCY MEDICINE

## 2024-04-29 PROCEDURE — 85025 COMPLETE CBC W/AUTO DIFF WBC: CPT | Performed by: EMERGENCY MEDICINE

## 2024-04-29 PROCEDURE — 93010 ELECTROCARDIOGRAM REPORT: CPT | Performed by: INTERNAL MEDICINE

## 2024-04-29 PROCEDURE — 81003 URINALYSIS AUTO W/O SCOPE: CPT | Performed by: EMERGENCY MEDICINE

## 2024-04-29 PROCEDURE — 25810000003 SODIUM CHLORIDE 0.9 % SOLUTION: Performed by: EMERGENCY MEDICINE

## 2024-04-29 PROCEDURE — 93005 ELECTROCARDIOGRAM TRACING: CPT | Performed by: EMERGENCY MEDICINE

## 2024-04-29 PROCEDURE — 93005 ELECTROCARDIOGRAM TRACING: CPT

## 2024-04-29 PROCEDURE — 83735 ASSAY OF MAGNESIUM: CPT | Performed by: EMERGENCY MEDICINE

## 2024-04-29 PROCEDURE — 84484 ASSAY OF TROPONIN QUANT: CPT | Performed by: EMERGENCY MEDICINE

## 2024-04-29 RX ORDER — SODIUM CHLORIDE 0.9 % (FLUSH) 0.9 %
10 SYRINGE (ML) INJECTION AS NEEDED
Status: DISCONTINUED | OUTPATIENT
Start: 2024-04-29 | End: 2024-04-29 | Stop reason: HOSPADM

## 2024-04-29 RX ADMIN — SODIUM CHLORIDE 1000 ML: 9 INJECTION, SOLUTION INTRAVENOUS at 15:00

## 2024-04-29 NOTE — ED PROVIDER NOTES
Time: 2:32 PM EDT  Date of encounter:  4/29/2024  Independent Historian/Clinical History and Information was obtained by:   Patient    History is limited by: N/A    Chief Complaint: Weakness      History of Present Illness:  Patient is a 79 y.o. year old female who presents to the emergency department for evaluation of worsening weakness for the past several days.  Patient reports that she has fallen.  Patient denies head injury.  Patient does report some pelvic pressure.  Patient has no nausea or vomiting.  Patient denies cough hemoptysis.  Patient denies dysuria but does report some urinary frequency.    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)     Hypertension     Lesion of sciatic nerve, bilateral lower limbs     Osteoporosis     Overactive bladder     Piriformis syndrome     Polyp of colon     Postmenopausal atrophic vaginitis     Vaginal atrophy      Past Surgical History:   Procedure Laterality Date    COLONOSCOPY  2019    LEEP N/A 1/6/2022    Procedure: LOOP ELECTROCAUTERY EXCISION PROCEDURE;  Surgeon: El Álvarez MD;  Location: [facility] MA[facility];  Service: Gynecology;  Laterality: N/A;    TUBAL ABDOMINAL LIGATION       Family History   Problem Relation Age of Onset    Thyroid disease Mother     Heart disease Mother     Heart disease Father     Thyroid disease Sister     Heart disease Sister     Colon cancer Sister        Home Medications:  Prior to Admission medications    Medication Sig Start Date End Date  Taking? Authorizing Provider   Calcium Carbonate-Vitamin D 600-200 MG-UNIT capsule Take 1 capsule by mouth Daily.    Nai Clements MD   denosumab (Prolia) 60 MG/ML solution prefilled syringe syringe Inject 1 mL under the skin into the appropriate area as directed Every 6 (Six) Months. Last dose: 9/21/22    Nai Clements MD   estradiol (ESTRACE) 0.1 MG/GM vaginal cream Insert 1 gram into the vagina 2-3 times a week 1/12/24   Sanjiv Kelly APRN   gabapentin (NEURONTIN) 600 MG tablet Take 1 tablet by mouth 3 (Three) Times a Day. 9/4/21   Nai Clements MD   HYDROcodone-acetaminophen (NORCO) 5-325 MG per tablet Take 1 tablet by mouth Every 8 (Eight) Hours As Needed. 10/11/22   Nai Clements MD   lisinopril (PRINIVIL,ZESTRIL) 10 MG tablet Take 1 tablet by mouth Daily.    Nai Clements MD   multivitamin with minerals tablet tablet Take 1 tablet by mouth Daily.    Nai Clements MD   triamcinolone (KENALOG) 0.1 % ointment Used twice daily for 2 weeks and then taper to once daily for 2 weeks.  If burning remains well controlled using every other day as needed. 8/8/23   Sanjiv Kelly APRN        Social History:   Social History     Tobacco Use    Smoking status: Never    Smokeless tobacco: Never    Tobacco comments:     current non smoker unknown if ever smoked   Vaping Use    Vaping status: Never Used   Substance Use Topics    Alcohol use: Not Currently    Drug use: Not Currently         Review of Systems:  Review of Systems   Constitutional:  Negative for chills and fever.   HENT:  Negative for congestion, rhinorrhea and sore throat.    Eyes:  Negative for pain and visual disturbance.   Respiratory:  Negative for apnea, cough, chest tightness and shortness of breath.    Cardiovascular:  Negative for chest pain and palpitations.   Gastrointestinal:  Negative for abdominal pain, diarrhea, nausea and vomiting.   Genitourinary:  Negative for difficulty urinating and  dysuria.   Musculoskeletal:  Negative for joint swelling and myalgias.   Skin:  Negative for color change.   Neurological:  Negative for seizures and headaches.   Psychiatric/Behavioral: Negative.     All other systems reviewed and are negative.       Physical Exam:  /76   Pulse 103   Temp 98.7 °F (37.1 °C) (Oral)   Resp 18   Wt 46.8 kg (103 lb 2.8 oz)   SpO2 100%   BMI 18.28 kg/m²     Physical Exam  Vitals and nursing note reviewed.   Constitutional:       General: She is not in acute distress.     Appearance: Normal appearance. She is not toxic-appearing.   HENT:      Head: Normocephalic and atraumatic.      Jaw: There is normal jaw occlusion.   Eyes:      General: Lids are normal.      Extraocular Movements: Extraocular movements intact.      Conjunctiva/sclera: Conjunctivae normal.      Pupils: Pupils are equal, round, and reactive to light.   Cardiovascular:      Rate and Rhythm: Normal rate and regular rhythm.      Pulses: Normal pulses.      Heart sounds: Normal heart sounds.   Pulmonary:      Effort: Pulmonary effort is normal. No respiratory distress.      Breath sounds: Normal breath sounds. No wheezing or rhonchi.   Abdominal:      General: Abdomen is flat.      Palpations: Abdomen is soft.      Tenderness: There is no abdominal tenderness. There is no guarding or rebound.   Musculoskeletal:         General: Normal range of motion.      Cervical back: Normal range of motion and neck supple.      Right lower leg: No edema.      Left lower leg: No edema.   Skin:     General: Skin is warm and dry.   Neurological:      Mental Status: She is alert and oriented to person, place, and time. Mental status is at baseline.   Psychiatric:         Mood and Affect: Mood normal.                  Procedures:  Procedures      Medical Decision Making:      Comorbidities that affect care:    Hypertension    External Notes reviewed:    Previous ED Note: Patient was last seen in the emergency department for  weakness and dysuria.      The following orders were placed and all results were independently analyzed by me:  Orders Placed This Encounter   Procedures    XR Chest 1 View    CT Head Without Contrast    [place] Draw    Comprehensive Metabolic Panel    Single High Sensitivity Troponin T    Magnesium    Urinalysis With Microscopic If Indicated (No Culture) - Urine, Clean Catch    CBC Auto Differential    NPO Diet NPO Type: Strict NPO    Undress & Gown    Continuous Pulse Oximetry    Vital Signs    Orthostatic Blood Pressure    Oxygen Therapy- Nasal Cannula; Titrate 1-6 LPM Per SpO2; 90 - 95%    POC Glucose Once    ECG 12 Lead ED Triage Standing Order; Weak / Dizzy / AMS    Insert Peripheral IV    Fall Precautions    CBC & Differential    Green Top (Gel)    Lavender Top    Gold Top - SST    Light Blue Top       Medications Given in the Emergency Department:  Medications   sodium chloride 0.9 % flush 10 mL (has no administration in time range)   sodium chloride 0.9 % bolus 1,000 mL (1,000 mL Intravenous New Bag 4/29/24 1500)        ED Course:         Labs:    Lab Results (last 24 hours)       Procedure Component Value Units Date/Time    CBC & Differential [[id number]]  (Abnormal) Collected: 04/29/24 1332    Specimen: Blood Updated: 04/29/24 1521    Narrative:      The following orders were created for panel order CBC & Differential.  Procedure                               Abnormality         Status                     ---------                               -----------         ------                     CBC Auto Differential[[id number]]        Abnormal            Final result                 Please view results for these tests on the individual orders.    Comprehensive Metabolic Panel [[id number]]  (Abnormal) Collected: 04/29/24 1332    Specimen: Blood Updated: 04/29/24 1400     Glucose 113 mg/dL      BUN 27 mg/dL      Creatinine 1.05 mg/dL      Sodium 139 mmol/L      Potassium 4.4 mmol/L      Comment: Slight hemolysis  detected by analyzer. Result may be falsely elevated.        Chloride 105 mmol/L      CO2 22.7 mmol/L      Calcium 9.0 mg/dL      Total Protein 6.5 g/dL      Albumin 4.0 g/dL      ALT (SGPT) 15 U/L      AST (SGOT) 29 U/L      Alkaline Phosphatase 77 U/L      Total Bilirubin 0.2 mg/dL      Globulin 2.5 gm/dL      A/G Ratio 1.6 g/dL      BUN/Creatinine Ratio 25.7     Anion Gap 11.3 mmol/L      eGFR 54.8 mL/min/1.73     Narrative:      GFR Normal >60  Chronic Kidney Disease <60  Kidney Failure <15    The GFR formula is only valid for adults with stable renal function between ages 18 and 70.    Single High Sensitivity Troponin T [[id number]]  (Abnormal) Collected: 04/29/24 1332    Specimen: Blood Updated: 04/29/24 1400     HS Troponin T 28 ng/L     Narrative:      High Sensitive Troponin T Reference Range:  <14.0 ng/L- Negative Female for AMI  <22.0 ng/L- Negative Male for AMI  >=14 - Abnormal Female indicating possible myocardial injury.  >=22 - Abnormal Male indicating possible myocardial injury.   Clinicians would have to utilize clinical acumen, EKG, Troponin, and serial changes to determine if it is an Acute Myocardial Infarction or myocardial injury due to an underlying chronic condition.         Magnesium [[id number]]  (Normal) Collected: 04/29/24 1332    Specimen: Blood Updated: 04/29/24 1400     Magnesium 2.3 mg/dL     CBC Auto Differential [[id number]]  (Abnormal) Collected: 04/29/24 1332    Specimen: Blood Updated: 04/29/24 1521     WBC 10.42 10*3/mm3      RBC 3.61 10*6/mm3      Hemoglobin 10.7 g/dL      Hematocrit 33.3 %      MCV 92.2 fL      MCH 29.6 pg      MCHC 32.1 g/dL      RDW 13.2 %      RDW-SD 45.7 fl      MPV 10.4 fL      Platelets 241 10*3/mm3      Neutrophil % 87.5 %      Lymphocyte % 5.4 %      Monocyte % 6.0 %      Eosinophil % 0.2 %      Basophil % 0.6 %      Immature Grans % 0.3 %      Neutrophils, Absolute 9.13 10*3/mm3      Lymphocytes, Absolute 0.56 10*3/mm3      Monocytes, Absolute 0.62  10*3/mm3      Eosinophils, Absolute 0.02 10*3/mm3      Basophils, Absolute 0.06 10*3/mm3      Immature Grans, Absolute 0.03 10*3/mm3      nRBC 0.0 /100 WBC     Urinalysis With Microscopic If Indicated (No Culture) - Urine, Clean Catch [[id number]]  (Normal) Collected: 04/29/24 1500    Specimen: Urine, Clean Catch Updated: 04/29/24 1532     Color, UA Yellow     Appearance, UA Clear     pH, UA 7.5     Specific Gravity, UA 1.007     Glucose, UA Negative     Ketones, UA Negative     Bilirubin, UA Negative     Blood, UA Negative     Protein, UA Negative     Leuk Esterase, UA Negative     Nitrite, UA Negative     Urobilinogen, UA 0.2 E.U./dL    Narrative:      Urine microscopic not indicated.             Imaging:    CT Head Without Contrast    Result Date: 4/29/2024   DATE OF EXAM: 4/29/2024 3:22 PM  PROCEDURE: CT HEAD WO CONTRAST-  INDICATIONS: headache  COMPARISON: CT head without contrast 5/20/2023  TECHNIQUE: Routine transaxial cuts were obtained through the head without the administration of contrast. Automated exposure control and iterative reconstruction methods were used.  FINDINGS: No intracranial hemorrhage. Negative for mass effect or midline shift. Generalized cerebral atrophy. No abnormal extra-axial fluid collection. No intraventricular hemorrhage. Posterior fossa without acute abnormality. Globes intact. No retro-orbital abnormality. Mild white matter findings suggesting chronic microvascular disease. The mastoid air cells are well-aerated. Negative for sinus fluid level. No calvarial fracture.      Stable chronic findings without acute process.  Electronically Signed By-Devin Tyler MD On:4/29/2024 3:45 PM      XR Chest 1 View    Result Date: 4/29/2024   DATE OF EXAM: 4/29/2024 1:00 PM  PROCEDURE: XR CHEST 1 VW-  INDICATIONS: Weak/Dizzy/AMS triage protocol  COMPARISON: 4/15/2024  TECHNIQUE: Portable chest radiograph.  FINDINGS:  The cardiomediastinal silhouette is within normal limits. The lungs are clear.  There is no pneumothorax, focal consolidation, or large pleural effusion. Osseous structures grossly intact.      No acute process.  Electronically Signed By-Devin Tyler MD On:4/29/2024 1:08 PM         Differential Diagnosis and Discussion:    Weakness: Based on the patient's history, signs, and symptoms, the diffential diagnosis includes but is not limited to meningitis, stroke, sepsis, subarachnoid hemorrhage, intracranial bleeding, encephalitis, acute uti, dehydration, MS, myasthenia gravis, [place], migraine variant, neuromuscular disorders vertigo, electrolyte imbalance, and metabolic disorders.    All labs were reviewed and interpreted by me.  CT scan radiology impression was interpreted by me.    MDM     The patient is resting comfortably and feels better, is alert, talkative and in no distress. The repeat examination is unremarkable and benign. The patient is neurologically intact, has a normal mental status and is ambulatory in the emergency department.  The patient´s CBC that was reviewed and interpreted by me shows no abnormalities of critical concern. Of note, there is no anemia requiring a blood transfusion and the platelet count is acceptable.  The patient´s CMP that was reviewed and interpretted by me shows no abnormalities of critical concern. Of note, the patient´s sodium and potassium are acceptable. The patient´s liver enzymes are unremarkable. The patient´s renal function (creatinine) is preserved. The patient has a normal anion gap.  CT head is negative for acute intracranial abnormalities.  The history, exam, diagnostic testing in the patient's current condition do not suggest meningitis, stroke, sepsis, subarachnoid hemorrhage, intracranial bleeding, encephalitis or other significant pathology that would warrant further testing, continued ED treatment, admission, neurological consultation, or other specialist evaluation at this point. The vital signs have been stable. The patient's  condition is stable and appropriate for discharge. The patient will pursue further outpatient evaluation with the primary care physician or other designated or consulting position as indicated in the discharge instructions.          Patient Care Considerations:    ANTIBIOTICS: I considered prescribing antibiotics as an outpatient however no bacterial focus of infection was found.      Consultants/Shared Management Plan:    None    Social Determinants of Health:    Patient is independent, reliable, and has access to care.       Disposition and Care Coordination:    Discharged: I considered escalation of care by admitting this patient to the hospital, however workup is unremarkable and the patient states that she has help at home via her son.    I have explained the patient´s condition, diagnoses and treatment plan based on the information available to me at this time. I have answered questions and addressed any concerns. The patient has a good  understanding of the patient´s diagnosis, condition, and treatment plan as can be expected at this point. The vital signs have been stable. The patient´s condition is stable and appropriate for discharge from the emergency department.      The patient will pursue further outpatient evaluation with the primary care physician or other designated or consulting physician as outlined in the discharge instructions. They are agreeable to this plan of care and follow-up instructions have been explained in detail. The patient has received these instructions in written format and has expressed an understanding of the discharge instructions. The patient is aware that any significant change in condition or worsening of symptoms should prompt an immediate return to this or the closest emergency department or call to 911.  I have explained discharge medications and the need for follow up with the patient/caretakers. This was also printed in the discharge instructions. Patient was discharged  with the following medications and follow up:      Medication List      No changes were made to your prescriptions during this visit.      Raad Del Toro MD  [address] [address]  [phone]             Final diagnoses:   Weakness        ED Disposition       ED Disposition   Discharge    Condition   Stable    Comment   --               This medical record created using voice recognition software.             Lavern Marion MD  04/29/24 1600

## 2024-04-30 LAB
QT INTERVAL: 342 MS
QTC INTERVAL: 430 MS

## 2024-05-06 ENCOUNTER — APPOINTMENT (OUTPATIENT)
Dept: GENERAL RADIOLOGY | Facility: HOSPITAL | Age: 79
DRG: 057 | End: 2024-05-06
Payer: MEDICARE

## 2024-05-06 ENCOUNTER — HOSPITAL ENCOUNTER (INPATIENT)
Facility: HOSPITAL | Age: 79
LOS: 7 days | Discharge: HOME HEALTH | DRG: 057 | End: 2024-05-13
Attending: EMERGENCY MEDICINE | Admitting: INTERNAL MEDICINE
Payer: MEDICARE

## 2024-05-06 ENCOUNTER — APPOINTMENT (OUTPATIENT)
Dept: MRI IMAGING | Facility: HOSPITAL | Age: 79
DRG: 057 | End: 2024-05-06
Payer: MEDICARE

## 2024-05-06 ENCOUNTER — APPOINTMENT (OUTPATIENT)
Dept: CARDIOLOGY | Facility: HOSPITAL | Age: 79
DRG: 057 | End: 2024-05-06
Payer: MEDICARE

## 2024-05-06 DIAGNOSIS — Z78.9 DECREASED ACTIVITIES OF DAILY LIVING (ADL): ICD-10-CM

## 2024-05-06 DIAGNOSIS — R07.9 CHEST PAIN, UNSPECIFIED TYPE: ICD-10-CM

## 2024-05-06 DIAGNOSIS — R53.1 GENERALIZED WEAKNESS: ICD-10-CM

## 2024-05-06 DIAGNOSIS — R26.81 UNSTEADY GAIT: ICD-10-CM

## 2024-05-06 DIAGNOSIS — R26.2 DIFFICULTY WALKING: ICD-10-CM

## 2024-05-06 DIAGNOSIS — N39.0 ACUTE UTI (URINARY TRACT INFECTION): ICD-10-CM

## 2024-05-06 DIAGNOSIS — G20.A1 PARKINSON'S DISEASE, UNSPECIFIED WHETHER DYSKINESIA PRESENT, UNSPECIFIED WHETHER MANIFESTATIONS FLUCTUATE: Primary | ICD-10-CM

## 2024-05-06 PROBLEM — F41.1 GENERALIZED ANXIETY DISORDER: Status: ACTIVE | Noted: 2024-05-06

## 2024-05-06 PROBLEM — R29.6 RECURRENT FALLS: Status: ACTIVE | Noted: 2024-05-06

## 2024-05-06 PROBLEM — R07.89 CHEST PAIN, ATYPICAL: Status: ACTIVE | Noted: 2024-05-06

## 2024-05-06 LAB
ALBUMIN SERPL-MCNC: 4 G/DL (ref 3.5–5.2)
ALBUMIN/GLOB SERPL: 1.7 G/DL
ALP SERPL-CCNC: 80 U/L (ref 39–117)
ALT SERPL W P-5'-P-CCNC: 13 U/L (ref 1–33)
ANION GAP SERPL CALCULATED.3IONS-SCNC: 11.8 MMOL/L (ref 5–15)
AST SERPL-CCNC: 31 U/L (ref 1–32)
BACTERIA UR QL AUTO: ABNORMAL /HPF
BASOPHILS # BLD AUTO: 0.06 10*3/MM3 (ref 0–0.2)
BASOPHILS NFR BLD AUTO: 0.7 % (ref 0–1.5)
BILIRUB SERPL-MCNC: 0.4 MG/DL (ref 0–1.2)
BILIRUB UR QL STRIP: NEGATIVE
BUN SERPL-MCNC: 19 MG/DL (ref 8–23)
BUN/CREAT SERPL: 18.1 (ref 7–25)
CALCIUM SPEC-SCNC: 8.6 MG/DL (ref 8.6–10.5)
CHLORIDE SERPL-SCNC: 107 MMOL/L (ref 98–107)
CLARITY UR: CLEAR
CO2 SERPL-SCNC: 22.2 MMOL/L (ref 22–29)
COLOR UR: YELLOW
CREAT SERPL-MCNC: 1.05 MG/DL (ref 0.57–1)
D-LACTATE SERPL-SCNC: 1.1 MMOL/L (ref 0.5–2)
DEPRECATED RDW RBC AUTO: 45.5 FL (ref 37–54)
EGFRCR SERPLBLD CKD-EPI 2021: 54.8 ML/MIN/1.73
EOSINOPHIL # BLD AUTO: 0.05 10*3/MM3 (ref 0–0.4)
EOSINOPHIL NFR BLD AUTO: 0.6 % (ref 0.3–6.2)
ERYTHROCYTE [DISTWIDTH] IN BLOOD BY AUTOMATED COUNT: 13.6 % (ref 12.3–15.4)
GEN 5 2HR TROPONIN T REFLEX: 25 NG/L
GLOBULIN UR ELPH-MCNC: 2.3 GM/DL
GLUCOSE SERPL-MCNC: 109 MG/DL (ref 65–99)
GLUCOSE UR STRIP-MCNC: NEGATIVE MG/DL
HCT VFR BLD AUTO: 35.2 % (ref 34–46.6)
HGB BLD-MCNC: 11.5 G/DL (ref 12–15.9)
HGB UR QL STRIP.AUTO: NEGATIVE
HOLD SPECIMEN: NORMAL
HOLD SPECIMEN: NORMAL
HYALINE CASTS UR QL AUTO: ABNORMAL /LPF
IMM GRANULOCYTES # BLD AUTO: 0.02 10*3/MM3 (ref 0–0.05)
IMM GRANULOCYTES NFR BLD AUTO: 0.2 % (ref 0–0.5)
KETONES UR QL STRIP: NEGATIVE
LEUKOCYTE ESTERASE UR QL STRIP.AUTO: ABNORMAL
LIPASE SERPL-CCNC: 32 U/L (ref 13–60)
LYMPHOCYTES # BLD AUTO: 0.61 10*3/MM3 (ref 0.7–3.1)
LYMPHOCYTES NFR BLD AUTO: 6.9 % (ref 19.6–45.3)
MAGNESIUM SERPL-MCNC: 2.2 MG/DL (ref 1.6–2.4)
MCH RBC QN AUTO: 29.8 PG (ref 26.6–33)
MCHC RBC AUTO-ENTMCNC: 32.7 G/DL (ref 31.5–35.7)
MCV RBC AUTO: 91.2 FL (ref 79–97)
MONOCYTES # BLD AUTO: 0.57 10*3/MM3 (ref 0.1–0.9)
MONOCYTES NFR BLD AUTO: 6.5 % (ref 5–12)
NEUTROPHILS NFR BLD AUTO: 7.51 10*3/MM3 (ref 1.7–7)
NEUTROPHILS NFR BLD AUTO: 85.1 % (ref 42.7–76)
NITRITE UR QL STRIP: NEGATIVE
NRBC BLD AUTO-RTO: 0 /100 WBC (ref 0–0.2)
NT-PROBNP SERPL-MCNC: 453.6 PG/ML (ref 0–1800)
PH UR STRIP.AUTO: 6 [PH] (ref 5–8)
PLATELET # BLD AUTO: 274 10*3/MM3 (ref 140–450)
PMV BLD AUTO: 9.8 FL (ref 6–12)
POTASSIUM SERPL-SCNC: 4.1 MMOL/L (ref 3.5–5.2)
PROT SERPL-MCNC: 6.3 G/DL (ref 6–8.5)
PROT UR QL STRIP: ABNORMAL
QT INTERVAL: 349 MS
QTC INTERVAL: 473 MS
RBC # BLD AUTO: 3.86 10*6/MM3 (ref 3.77–5.28)
RBC # UR STRIP: ABNORMAL /HPF
REF LAB TEST METHOD: ABNORMAL
SODIUM SERPL-SCNC: 141 MMOL/L (ref 136–145)
SP GR UR STRIP: 1.02 (ref 1–1.03)
SQUAMOUS #/AREA URNS HPF: ABNORMAL /HPF
T4 FREE SERPL-MCNC: 1.47 NG/DL (ref 0.92–1.68)
TROPONIN T DELTA: 1 NG/L
TROPONIN T SERPL HS-MCNC: 24 NG/L
TSH SERPL DL<=0.05 MIU/L-ACNC: 0.94 UIU/ML (ref 0.27–4.2)
UROBILINOGEN UR QL STRIP: ABNORMAL
WBC # UR STRIP: ABNORMAL /HPF
WBC NRBC COR # BLD AUTO: 8.82 10*3/MM3 (ref 3.4–10.8)
WHOLE BLOOD HOLD COAG: NORMAL
WHOLE BLOOD HOLD SPECIMEN: NORMAL

## 2024-05-06 PROCEDURE — 83690 ASSAY OF LIPASE: CPT | Performed by: EMERGENCY MEDICINE

## 2024-05-06 PROCEDURE — 93010 ELECTROCARDIOGRAM REPORT: CPT | Performed by: INTERNAL MEDICINE

## 2024-05-06 PROCEDURE — 70551 MRI BRAIN STEM W/O DYE: CPT

## 2024-05-06 PROCEDURE — 85025 COMPLETE CBC W/AUTO DIFF WBC: CPT | Performed by: EMERGENCY MEDICINE

## 2024-05-06 PROCEDURE — 84439 ASSAY OF FREE THYROXINE: CPT | Performed by: INTERNAL MEDICINE

## 2024-05-06 PROCEDURE — 84484 ASSAY OF TROPONIN QUANT: CPT | Performed by: EMERGENCY MEDICINE

## 2024-05-06 PROCEDURE — 80053 COMPREHEN METABOLIC PANEL: CPT | Performed by: EMERGENCY MEDICINE

## 2024-05-06 PROCEDURE — 93005 ELECTROCARDIOGRAM TRACING: CPT

## 2024-05-06 PROCEDURE — 36415 COLL VENOUS BLD VENIPUNCTURE: CPT

## 2024-05-06 PROCEDURE — 93005 ELECTROCARDIOGRAM TRACING: CPT | Performed by: EMERGENCY MEDICINE

## 2024-05-06 PROCEDURE — 93306 TTE W/DOPPLER COMPLETE: CPT | Performed by: INTERNAL MEDICINE

## 2024-05-06 PROCEDURE — 71045 X-RAY EXAM CHEST 1 VIEW: CPT

## 2024-05-06 PROCEDURE — 84443 ASSAY THYROID STIM HORMONE: CPT | Performed by: INTERNAL MEDICINE

## 2024-05-06 PROCEDURE — 25810000003 SODIUM CHLORIDE 0.9 % SOLUTION: Performed by: EMERGENCY MEDICINE

## 2024-05-06 PROCEDURE — 88185 FLOWCYTOMETRY/TC ADD-ON: CPT | Performed by: INTERNAL MEDICINE

## 2024-05-06 PROCEDURE — 81001 URINALYSIS AUTO W/SCOPE: CPT | Performed by: EMERGENCY MEDICINE

## 2024-05-06 PROCEDURE — 83880 ASSAY OF NATRIURETIC PEPTIDE: CPT | Performed by: EMERGENCY MEDICINE

## 2024-05-06 PROCEDURE — 83735 ASSAY OF MAGNESIUM: CPT | Performed by: EMERGENCY MEDICINE

## 2024-05-06 PROCEDURE — 93306 TTE W/DOPPLER COMPLETE: CPT

## 2024-05-06 PROCEDURE — 87086 URINE CULTURE/COLONY COUNT: CPT | Performed by: EMERGENCY MEDICINE

## 2024-05-06 PROCEDURE — 99285 EMERGENCY DEPT VISIT HI MDM: CPT

## 2024-05-06 PROCEDURE — 25010000002 CEFTRIAXONE PER 250 MG: Performed by: EMERGENCY MEDICINE

## 2024-05-06 PROCEDURE — 83605 ASSAY OF LACTIC ACID: CPT | Performed by: EMERGENCY MEDICINE

## 2024-05-06 RX ORDER — HYDROCODONE BITARTRATE AND ACETAMINOPHEN 5; 325 MG/1; MG/1
1 TABLET ORAL EVERY 4 HOURS PRN
Status: DISCONTINUED | OUTPATIENT
Start: 2024-05-06 | End: 2024-05-13 | Stop reason: HOSPADM

## 2024-05-06 RX ORDER — BISACODYL 5 MG/1
5 TABLET, DELAYED RELEASE ORAL DAILY PRN
Status: DISCONTINUED | OUTPATIENT
Start: 2024-05-06 | End: 2024-05-13 | Stop reason: HOSPADM

## 2024-05-06 RX ORDER — SODIUM CHLORIDE 0.9 % (FLUSH) 0.9 %
10 SYRINGE (ML) INJECTION EVERY 12 HOURS SCHEDULED
Status: DISCONTINUED | OUTPATIENT
Start: 2024-05-06 | End: 2024-05-13 | Stop reason: HOSPADM

## 2024-05-06 RX ORDER — SODIUM CHLORIDE 0.9 % (FLUSH) 0.9 %
10 SYRINGE (ML) INJECTION AS NEEDED
Status: DISCONTINUED | OUTPATIENT
Start: 2024-05-06 | End: 2024-05-13 | Stop reason: HOSPADM

## 2024-05-06 RX ORDER — ASPIRIN 81 MG/1
324 TABLET, CHEWABLE ORAL ONCE
Status: COMPLETED | OUTPATIENT
Start: 2024-05-06 | End: 2024-05-06

## 2024-05-06 RX ORDER — ACETAMINOPHEN 325 MG/1
650 TABLET ORAL EVERY 4 HOURS PRN
Status: DISCONTINUED | OUTPATIENT
Start: 2024-05-06 | End: 2024-05-13 | Stop reason: HOSPADM

## 2024-05-06 RX ORDER — ONDANSETRON 2 MG/ML
4 INJECTION INTRAMUSCULAR; INTRAVENOUS EVERY 4 HOURS PRN
Status: DISCONTINUED | OUTPATIENT
Start: 2024-05-06 | End: 2024-05-13 | Stop reason: HOSPADM

## 2024-05-06 RX ORDER — ALUMINA, MAGNESIA, AND SIMETHICONE 2400; 2400; 240 MG/30ML; MG/30ML; MG/30ML
15 SUSPENSION ORAL EVERY 6 HOURS PRN
Status: DISCONTINUED | OUTPATIENT
Start: 2024-05-06 | End: 2024-05-13 | Stop reason: HOSPADM

## 2024-05-06 RX ORDER — SODIUM CHLORIDE 9 MG/ML
40 INJECTION, SOLUTION INTRAVENOUS AS NEEDED
Status: DISCONTINUED | OUTPATIENT
Start: 2024-05-06 | End: 2024-05-13 | Stop reason: HOSPADM

## 2024-05-06 RX ORDER — ENOXAPARIN SODIUM 100 MG/ML
40 INJECTION SUBCUTANEOUS DAILY
Status: DISCONTINUED | OUTPATIENT
Start: 2024-05-07 | End: 2024-05-08

## 2024-05-06 RX ORDER — BISACODYL 10 MG
10 SUPPOSITORY, RECTAL RECTAL DAILY PRN
Status: DISCONTINUED | OUTPATIENT
Start: 2024-05-06 | End: 2024-05-13 | Stop reason: HOSPADM

## 2024-05-06 RX ORDER — FAMOTIDINE 20 MG/1
40 TABLET, FILM COATED ORAL DAILY
Status: DISCONTINUED | OUTPATIENT
Start: 2024-05-06 | End: 2024-05-13 | Stop reason: HOSPADM

## 2024-05-06 RX ORDER — AMOXICILLIN 250 MG
2 CAPSULE ORAL 2 TIMES DAILY PRN
Status: DISCONTINUED | OUTPATIENT
Start: 2024-05-06 | End: 2024-05-13 | Stop reason: HOSPADM

## 2024-05-06 RX ORDER — CLONAZEPAM 0.5 MG/1
0.5 TABLET ORAL 2 TIMES DAILY
Status: DISCONTINUED | OUTPATIENT
Start: 2024-05-06 | End: 2024-05-07

## 2024-05-06 RX ORDER — METOPROLOL SUCCINATE 50 MG/1
25 TABLET, EXTENDED RELEASE ORAL
Status: DISCONTINUED | OUTPATIENT
Start: 2024-05-06 | End: 2024-05-08

## 2024-05-06 RX ORDER — MORPHINE SULFATE 2 MG/ML
2 INJECTION, SOLUTION INTRAMUSCULAR; INTRAVENOUS
Status: ACTIVE | OUTPATIENT
Start: 2024-05-06 | End: 2024-05-09

## 2024-05-06 RX ORDER — NALOXONE HCL 0.4 MG/ML
0.4 VIAL (ML) INJECTION
Status: DISCONTINUED | OUTPATIENT
Start: 2024-05-06 | End: 2024-05-13 | Stop reason: HOSPADM

## 2024-05-06 RX ORDER — TEMAZEPAM 15 MG/1
15 CAPSULE ORAL NIGHTLY PRN
Status: DISCONTINUED | OUTPATIENT
Start: 2024-05-06 | End: 2024-05-13 | Stop reason: HOSPADM

## 2024-05-06 RX ORDER — DEXTROSE MONOHYDRATE, SODIUM CHLORIDE, AND POTASSIUM CHLORIDE 50; 1.49; 9 G/1000ML; G/1000ML; G/1000ML
50 INJECTION, SOLUTION INTRAVENOUS CONTINUOUS
Status: DISCONTINUED | OUTPATIENT
Start: 2024-05-06 | End: 2024-05-13 | Stop reason: HOSPADM

## 2024-05-06 RX ORDER — POLYETHYLENE GLYCOL 3350 17 G/17G
17 POWDER, FOR SOLUTION ORAL DAILY PRN
Status: DISCONTINUED | OUTPATIENT
Start: 2024-05-06 | End: 2024-05-13 | Stop reason: HOSPADM

## 2024-05-06 RX ORDER — ALPRAZOLAM 0.25 MG/1
0.25 TABLET ORAL EVERY 6 HOURS PRN
Status: DISCONTINUED | OUTPATIENT
Start: 2024-05-06 | End: 2024-05-08

## 2024-05-06 RX ADMIN — TEMAZEPAM 15 MG: 15 CAPSULE ORAL at 21:49

## 2024-05-06 RX ADMIN — METOPROLOL SUCCINATE 25 MG: 50 TABLET, EXTENDED RELEASE ORAL at 20:19

## 2024-05-06 RX ADMIN — CLONAZEPAM 0.5 MG: 0.5 TABLET ORAL at 20:19

## 2024-05-06 RX ADMIN — ALPRAZOLAM 0.25 MG: 0.25 TABLET ORAL at 21:49

## 2024-05-06 RX ADMIN — POTASSIUM CHLORIDE, DEXTROSE MONOHYDRATE AND SODIUM CHLORIDE 100 ML/HR: 150; 5; 900 INJECTION, SOLUTION INTRAVENOUS at 17:49

## 2024-05-06 RX ADMIN — SODIUM CHLORIDE 500 ML: 9 INJECTION, SOLUTION INTRAVENOUS at 09:26

## 2024-05-06 RX ADMIN — ASPIRIN 324 MG: 81 TABLET, CHEWABLE ORAL at 08:40

## 2024-05-06 RX ADMIN — CARBIDOPA AND LEVODOPA 1 TABLET: 25; 100 TABLET ORAL at 21:49

## 2024-05-06 RX ADMIN — FAMOTIDINE 40 MG: 20 TABLET ORAL at 17:48

## 2024-05-06 RX ADMIN — CEFTRIAXONE SODIUM 1000 MG: 1 INJECTION, POWDER, FOR SOLUTION INTRAMUSCULAR; INTRAVENOUS at 09:26

## 2024-05-06 NOTE — ED PROVIDER NOTES
Time: 8:46 AM EDT  Date of encounter:  5/6/2024  Independent Historian/Clinical History and Information was obtained by:   Patient and Family    History is limited by: N/A    Chief Complaint: Chest pain this morning now resolved, generalized weakness for over a week      History of Present Illness:  Patient is a 79 y.o. year old female with history of hypertension who presents to the emergency department for evaluation of substernal chest pain this morning that woke her up from sleep around 2 or 3 in the morning, then she went back to bed and when she awoke this morning it was gone.    She is now chest pain-free and denies any known history of heart attacks or cardiac stents or heart disease.    No left arm pain or shortness of breath reported.    No recent illnesses.    Her family member also reports she has been generally weaker than normal and having significant difficulty with walking around and somewhat more shaky than normal and he has been significantly assisting her.    She has also had some tremors of the hands.    She is able to move each of her arms and legs without difficulty and no signs of stroke are noted.    She denies any fevers or chills or vomiting or diarrhea and has been eating and drinking normally.    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)     Hypertension     Lesion of sciatic nerve, bilateral lower limbs     Osteoporosis     Overactive bladder     Piriformis syndrome     Polyp of colon      Postmenopausal atrophic vaginitis     Vaginal atrophy      Past Surgical History:   Procedure Laterality Date    COLONOSCOPY  2019    LEEP N/A 1/6/2022    Procedure: LOOP ELECTROCAUTERY EXCISION PROCEDURE;  Surgeon: El Álvarez MD;  Location: [facility] MAIN OR;  Service: Gynecology;  Laterality: N/A;    TUBAL ABDOMINAL LIGATION       Family History   Problem Relation Age of Onset    Thyroid disease Mother     Heart disease Mother     Heart disease Father     Thyroid disease Sister     Heart disease Sister     Colon cancer Sister        Home Medications:  Prior to Admission medications    Medication Sig Start Date End Date Taking? Authorizing Provider   Calcium Carbonate-Vitamin D 600-200 MG-UNIT capsule Take 1 capsule by mouth Daily.    Nai Clements MD   denosumab (Prolia) 60 MG/ML solution prefilled syringe syringe Inject 1 mL under the skin into the appropriate area as directed Every 6 (Six) Months. Last dose: 9/21/22    Nai Clements MD   estradiol (ESTRACE) 0.1 MG/GM vaginal cream Insert 1 gram into the vagina 2-3 times a week 1/12/24   Sanjiv Kelly APRN   gabapentin (NEURONTIN) 600 MG tablet Take 1 tablet by mouth 3 (Three) Times a Day. 9/4/21   Nai Clements MD   HYDROcodone-acetaminophen (NORCO) 5-325 MG per tablet Take 1 tablet by mouth Every 8 (Eight) Hours As Needed. 10/11/22   Nai Clements MD   lisinopril (PRINIVIL,ZESTRIL) 10 MG tablet Take 1 tablet by mouth Daily.    Nai Clements MD   multivitamin with minerals tablet tablet Take 1 tablet by mouth Daily.    Nai Clements MD   triamcinolone (KENALOG) 0.1 % ointment Used twice daily for 2 weeks and then taper to once daily for 2 weeks.  If burning remains well controlled using every other day as needed. 8/8/23   Sanjiv Kelly APRN        Social History:   Social History     Tobacco Use    Smoking status: Never    Smokeless tobacco: Never    Tobacco comments:     current non smoker  "unknown if ever smoked   Vaping Use    Vaping status: Never Used   Substance Use Topics    Alcohol use: Not Currently    Drug use: Not Currently         Review of Systems:  Review of Systems   I performed a 10 point review of systems which was all negative, except for the positives found in the HPI above.  Physical Exam:  /94 (BP Location: Left arm, Patient Position: Sitting)   Pulse 112   Temp 98.3 °F (36.8 °C) (Oral)   Resp 18   Ht 167.6 cm (66\")   Wt 45.8 kg (101 lb)   SpO2 98%   BMI 16.30 kg/m²     Physical Exam   General: Awake alert and in no obvious distress    HEENT: Head normocephalic atraumatic, eyes PERRLA EOMI, nose normal, oropharynx normal.    Neck: Supple full range of motion, no meningismus, no lymphadenopathy    Heart: Regular rate and rhythm, no murmurs or rubs, 2+ radial pulses bilaterally    Lungs: Clear to auscultation bilaterally without wheezes or crackles, no respiratory distress    Abdomen: Soft, nontender, nondistended, no rebound or guarding    Skin: Warm, dry, no rash    Musculoskeletal: Normal range of motion, no lower extremity edema    Neurologic: Oriented x3, no motor deficits no sensory deficits, moves arms and legs normally with normal sensation and no facial droop or slurred speech but does have some resting tremor.  She had a very unsteady or ataxic gait and required significant assistance.    Psychiatric: Mood appears stable, no psychosis          Procedures:  Procedures      Medical Decision Making:      Comorbidities that affect care:    Hypertension    External Notes reviewed:    None      The following orders were placed and all results were independently analyzed by me:  Orders Placed This Encounter   Procedures    Urine Culture - Urine,    XR Chest 1 View    Friona Draw    High Sensitivity Troponin T    Comprehensive Metabolic Panel    Lipase    BNP    Magnesium    CBC Auto Differential    Urinalysis With Culture If Indicated - Urine, Clean Catch    " Urinalysis, Microscopic Only - Urine, Clean Catch    Lactic Acid, Plasma    High Sensitivity Troponin T 2Hr    NPO Diet NPO Type: Strict NPO    Undress & Gown    Continuous Pulse Oximetry    Ambulate patient    Internal Medicine (on-call MD unless specified)    Oxygen Therapy- Nasal Cannula; Titrate 1-6 LPM Per SpO2; 90 - 95%    ECG 12 Lead ED Triage Standing Order; Chest Pain    ECG 12 Lead ED Triage Standing Order; Chest Pain    Insert Peripheral IV    CBC & Differential    Green Top (Gel)    Lavender Top    Gold Top - SST    Light Blue Top       Medications Given in the Emergency Department:  Medications   sodium chloride 0.9 % flush 10 mL (has no administration in time range)   aspirin chewable tablet 324 mg (324 mg Oral Given 5/6/24 0840)   cefTRIAXone (ROCEPHIN) 1000 mg/100 mL 0.9% NS (MBP) (0 mg Intravenous Stopped 5/6/24 1005)   sodium chloride 0.9 % bolus 500 mL (0 mL Intravenous Stopped 5/6/24 1005)        ED Course:    ED Course as of 05/06/24 1143   Mon May 06, 2024   0823 EKG: I interpreted her twelve-lead EKG as sinus tachycardia at 110 bpm, normal P waves, normal QRS, except there are some Q waves noted in the anteroseptal leads that are old, normal ST segments and T waves, no ectopy or ischemia. [VS]      ED Course User Index  [VS] Oscar Arteaga MD       Labs:    Lab Results (last 24 hours)       Procedure Component Value Units Date/Time    High Sensitivity Troponin T [[id number]]  (Abnormal) Collected: 05/06/24 0812    Specimen: Blood Updated: 05/06/24 0844     HS Troponin T 24 ng/L     Narrative:      High Sensitive Troponin T Reference Range:  <14.0 ng/L- Negative Female for AMI  <22.0 ng/L- Negative Male for AMI  >=14 - Abnormal Female indicating possible myocardial injury.  >=22 - Abnormal Male indicating possible myocardial injury.   Clinicians would have to utilize clinical acumen, EKG, Troponin, and serial changes to determine if it is an Acute Myocardial Infarction or myocardial injury due  to an underlying chronic condition.         CBC & Differential [[id number]]  (Abnormal) Collected: 05/06/24 0812    Specimen: Blood Updated: 05/06/24 0824    Narrative:      The following orders were created for panel order CBC & Differential.  Procedure                               Abnormality         Status                     ---------                               -----------         ------                     CBC Auto Differential[[id number]]        Abnormal            Final result                 Please view results for these tests on the individual orders.    Comprehensive Metabolic Panel [[id number]]  (Abnormal) Collected: 05/06/24 0812    Specimen: Blood Updated: 05/06/24 0847     Glucose 109 mg/dL      BUN 19 mg/dL      Creatinine 1.05 mg/dL      Sodium 141 mmol/L      Potassium 4.1 mmol/L      Comment: Slight hemolysis detected by analyzer. Result may be falsely elevated.        Chloride 107 mmol/L      CO2 22.2 mmol/L      Calcium 8.6 mg/dL      Total Protein 6.3 g/dL      Albumin 4.0 g/dL      ALT (SGPT) 13 U/L      AST (SGOT) 31 U/L      Comment: Slight hemolysis detected by analyzer. Result may be falsely elevated.        Alkaline Phosphatase 80 U/L      Total Bilirubin 0.4 mg/dL      Globulin 2.3 gm/dL      A/G Ratio 1.7 g/dL      BUN/Creatinine Ratio 18.1     Anion Gap 11.8 mmol/L      eGFR 54.8 mL/min/1.73     Narrative:      GFR Normal >60  Chronic Kidney Disease <60  Kidney Failure <15    The GFR formula is only valid for adults with stable renal function between ages 18 and 70.    Lipase [[id number]]  (Normal) Collected: 05/06/24 0812    Specimen: Blood Updated: 05/06/24 0844     Lipase 32 U/L     BNP [[id number]]  (Normal) Collected: 05/06/24 0812    Specimen: Blood Updated: 05/06/24 0842     proBNP 453.6 pg/mL     Narrative:      This assay is used as an aid in the diagnosis of individuals suspected of having heart failure. It can be used as an aid in the diagnosis of acute decompensated heart  failure (ADHF) in patients presenting with signs and symptoms of ADHF to the emergency department (ED). In addition, NT-proBNP of <300 pg/mL indicates ADHF is not likely.    Age Range Result Interpretation  NT-proBNP Concentration (pg/mL:      <50             Positive            >450                   Gray                 300-450                    Negative             <300    50-75           Positive            >900                  Gray                300-900                  Negative            <300      >75             Positive            >1800                  Gray                300-1800                  Negative            <300    Magnesium [[id number]]  (Normal) Collected: 05/06/24 0812    Specimen: Blood Updated: 05/06/24 0847     Magnesium 2.2 mg/dL     CBC Auto Differential [[id number]]  (Abnormal) Collected: 05/06/24 0812    Specimen: Blood Updated: 05/06/24 0824     WBC 8.82 10*3/mm3      RBC 3.86 10*6/mm3      Hemoglobin 11.5 g/dL      Hematocrit 35.2 %      MCV 91.2 fL      MCH 29.8 pg      MCHC 32.7 g/dL      RDW 13.6 %      RDW-SD 45.5 fl      MPV 9.8 fL      Platelets 274 10*3/mm3      Neutrophil % 85.1 %      Lymphocyte % 6.9 %      Monocyte % 6.5 %      Eosinophil % 0.6 %      Basophil % 0.7 %      Immature Grans % 0.2 %      Neutrophils, Absolute 7.51 10*3/mm3      Lymphocytes, Absolute 0.61 10*3/mm3      Monocytes, Absolute 0.57 10*3/mm3      Eosinophils, Absolute 0.05 10*3/mm3      Basophils, Absolute 0.06 10*3/mm3      Immature Grans, Absolute 0.02 10*3/mm3      nRBC 0.0 /100 WBC     Urinalysis With Culture If Indicated - Urine, Clean Catch [[id number]]  (Abnormal) Collected: 05/06/24 0814    Specimen: Urine, Clean Catch Updated: 05/06/24 0828     Color, UA Yellow     Appearance, UA Clear     pH, UA 6.0     Specific Gravity, UA 1.020     Glucose, UA Negative     Ketones, UA Negative     Bilirubin, UA Negative     Blood, UA Negative     Protein, UA Trace     Leuk Esterase, UA Small (1+)      Nitrite, UA Negative     Urobilinogen, UA 1.0 E.U./dL    Narrative:      In absence of clinical symptoms, the presence of pyuria, bacteria, and/or nitrites on the urinalysis result does not correlate with infection.    Urinalysis, Microscopic Only - Urine, Clean Catch [[id number]]  (Abnormal) Collected: 05/06/24 0814    Specimen: Urine, Clean Catch Updated: 05/06/24 0828     RBC, UA 3-5 /HPF      WBC, UA 11-20 /HPF      Bacteria, UA None Seen /HPF      Squamous Epithelial Cells, UA 0-2 /HPF      Hyaline Casts, UA 3-6 /LPF      Methodology Automated Microscopy    Urine Culture - Urine, Urine, Clean Catch [[id number]] Collected: 05/06/24 0814    Specimen: Urine, Clean Catch Updated: 05/06/24 0828    Lactic Acid, Plasma [[id number]]  (Normal) Collected: 05/06/24 0930    Specimen: Blood Updated: 05/06/24 0956     Lactate 1.1 mmol/L              Imaging:    XR Chest 1 View    Result Date: 5/6/2024  XR CHEST 1 VW-  Date of Exam: 5/6/2024 7:39 AM  Indication: Chest Pain Triage Protocol  Comparison: None available.  Findings: The heart size is within normal limits. There is advanced emphysematous lung disease with apical fibrosis. No acute infiltrates are identified. The pulmonary vascular markings are normal.      Impression:  1. Chronic advanced emphysematous lung disease. 2. No acute process identified.   Electronically Signed By-Christian Handley MD On:5/6/2024 7:54 AM         Differential Diagnosis and Discussion:    Chest Pain:  Based on the patient's signs and symptoms, I considered aortic dissection, myocardial infaction, pulmonary embolism, cardiac tamponade, pericarditis, pneumothorax, musculoskeletal chest pain and other differential diagnosis as an etiology of the patient's chest pain.   Weakness: Based on the patient's history, signs, and symptoms, the diffential diagnosis includes but is not limited to meningitis, stroke, sepsis, subarachnoid hemorrhage, intracranial bleeding, encephalitis, acute uti, dehydration, MS,  myasthenia gravis, [place], migraine variant, neuromuscular disorders vertigo, electrolyte imbalance, and metabolic disorders.    All labs were reviewed and interpreted by me.  All X-rays impressions were independently interpreted by me.  EKG was interpreted by me.    MDM     Amount and/or Complexity of Data Reviewed  Clinical lab tests: reviewed  Tests in the radiology section of CPT®: reviewed  Tests in the medicine section of CPT®: reviewed  Decide to obtain previous medical records or to obtain history from someone other than the patient: yes             This patient is a pleasant 79-year-old female presenting with some chest pain in the middle of the night that has since resolved prior to arrival.    She is now chest pain-free and her EKG shows no acute ischemia and her troponin here is indeterminately elevated measuring 24 today, but upon my review of previous troponins this month it is unchanged from her baseline.    I do not see any evidence of ACS or anything life-threatening.    We are giving her a bit of IV fluids and also IV ceftriaxone for her UTI and we will ambulate her here to see if she is able to be managed as an outpatient.        Unfortunately she was significantly unsteady and ataxic in her gait per nursing staff and they had to hold her with both hands and she almost fell.    I spoke with her son via telephone and it sounds like this has been going on for 2 weeks and is getting worse and it sounds like she may benefit from hospital admission for further evaluation.    I will also order MRI of the brain without contrast to rule out acute ischemic stroke in the past 2 weeks.              Patient Care Considerations:          Consultants/Shared Management Plan:    PCP: I have discussed the case with Dr. Prince who agrees to accept the patient for admission.    Social Determinants of Health:    Patient has presented with family members who are responsible, reliable and will ensure follow up  care.      Disposition and Care Coordination:    Discharged: I considered escalation of care by admitting this patient to the hospital, however patient able to ambulate for us with minimal assistance and looks stable for discharge home.    I have explained the patient´s condition, diagnoses and treatment plan based on the information available to me at this time. I have answered questions and addressed any concerns. The patient has a good  understanding of the patient´s diagnosis, condition, and treatment plan as can be expected at this point. The vital signs have been stable. The patient´s condition is stable and appropriate for discharge from the emergency department.      The patient will pursue further outpatient evaluation with the primary care physician or other designated or consulting physician as outlined in the discharge instructions. They are agreeable to this plan of care and follow-up instructions have been explained in detail. The patient has received these instructions in written format and has expressed an understanding of the discharge instructions. The patient is aware that any significant change in condition or worsening of symptoms should prompt an immediate return to this or the closest emergency department or call to 911.  I have explained discharge medications and the need for follow up with the patient/caretakers. This was also printed in the discharge instructions. Patient was discharged with the following medications and follow up:      Medication List      No changes were made to your prescriptions during this visit.      No follow-up provider specified.     Final diagnoses:   Acute UTI (urinary tract infection)   Generalized weakness   Chest pain, unspecified type   Unsteady gait        ED Disposition       ED Disposition   Intended Admit    Condition   --    Comment   --               This medical record created using voice recognition software.             Oscar Arteaga MD  05/06/24  1141

## 2024-05-06 NOTE — PLAN OF CARE
Goal Outcome Evaluation:  Patient is a x2 assist and reports feeling weak and shaky when trying to ambulate. Scheduled medications given and safety checks maintained.

## 2024-05-06 NOTE — H&P
[facility]   HISTORY AND PHYSICAL    Patient Name: Devi Bobo  : 1945  MRN: [id number]  Primary Care Physician:  Raad Del Toro MD  Date of admission: 2024    Subjective   Subjective     Chief Complaint:   Chest pain/anxiety/palpitations and recurrent fall      HPI:    Devi Bobo is a 79 y.o. female was doing fairly well according to son progressively got worse over the last couple of months.  Today she called for help and ambulance and she said she was having shaking and chest discomfort and palpitations.  Workup in the ER revealed negative for acute stroke.  ER physician recommended admission to hospital for chest pain.  Cardiac enzymes are negative.  Patient reports more anxiety than anything else and she states she is unable to stand up and walk her son who is the caregiver report she is unable to walk and carry out her ADLs for last couple of weeks.  She has been in outpatient physical therapy but it is not helping.  She has fallen few times.  When I examined her this afternoon she is awake alert, unable to give me a detailed history but complains of tremors and weakness.        Review of Systems:    Weakness, fatigue, weight loss, no chest pain now but chest pain when she arrived.  No shortness of breath.  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)    • Hypertension    • Lesion of sciatic nerve, bilateral lower limbs    • Osteoporosis    • Overactive bladder    • Piriformis syndrome    • Polyp of colon    • Postmenopausal  atrophic vaginitis    • Vaginal atrophy        Past Surgical History:   Procedure Laterality Date   • COLONOSCOPY  2019   • LEEP N/A 1/6/2022    Procedure: LOOP ELECTROCAUTERY EXCISION PROCEDURE;  Surgeon: El Álvarez MD;  Location: [facility] MAIN OR;  Service: Gynecology;  Laterality: N/A;   • TUBAL ABDOMINAL LIGATION         Family History: family history includes Colon cancer in her sister; Heart disease in her father, mother, and sister; Thyroid disease in her mother and sister. Otherwise pertinent FHx was reviewed and not pertinent to current issue.    Social History:  reports that she has never smoked. She has never used smokeless tobacco. She reports that she does not currently use alcohol. She reports that she does not currently use drugs.    Home Medications:  Calcium Carbonate-Vitamin D, HYDROcodone-acetaminophen, denosumab, estradiol, gabapentin, lisinopril, and multivitamin with minerals      Allergies:  Allergies   Allergen Reactions   • Sulfa Antibiotics Anaphylaxis   • Nitrofurantoin Confusion       Objective   Objective     Vitals:   Temp:  [98.3 °F (36.8 °C)-100 °F (37.8 °C)] 100 °F (37.8 °C)  Heart Rate:  [] 142  Resp:  [18-21] 20  BP: (132-153)/(73-95) 132/91    Physical Exam    Elderly female cachectic looks older than stated age, not in acute distress but anxious.  It was reactive.  Extraocular was intact.  Neck supple.  Heart tachycardic.  Lungs diminished breath sounds.  Abdomen soft.  Extremities no edema, increased rigidity and stiffness noted.  Tremors noted.      I have personally reviewed the results from the time of this admission to 5/6/2024 19:20 EDT and agree with these findings:  [x]  Laboratory  [x]  Microbiology  [x]  Radiology  [x]  EKG/Telemetry   []  Cardiology/Vascular   []  Pathology  []  Old records  []  Other:    CBC:    WBC   Date Value Ref Range Status   05/06/2024 8.82 3.40 - 10.80 10*3/mm3 Final     RBC   Date Value Ref Range Status   05/06/2024 3.86 3.77 -  5.28 10*6/mm3 Final     Hemoglobin   Date Value Ref Range Status   05/06/2024 11.5 (L) 12.0 - 15.9 g/dL Final     Hematocrit   Date Value Ref Range Status   05/06/2024 35.2 34.0 - 46.6 % Final     MCV   Date Value Ref Range Status   05/06/2024 91.2 79.0 - 97.0 fL Final     MCH   Date Value Ref Range Status   05/06/2024 29.8 26.6 - 33.0 pg Final     MCHC   Date Value Ref Range Status   05/06/2024 32.7 31.5 - 35.7 g/dL Final     RDW   Date Value Ref Range Status   05/06/2024 13.6 12.3 - 15.4 % Final     RDW-SD   Date Value Ref Range Status   05/06/2024 45.5 37.0 - 54.0 fl Final     MPV   Date Value Ref Range Status   05/06/2024 9.8 6.0 - 12.0 fL Final     Platelets   Date Value Ref Range Status   05/06/2024 274 140 - 450 10*3/mm3 Final     Neutrophil %   Date Value Ref Range Status   05/06/2024 85.1 (H) 42.7 - 76.0 % Final     Lymphocyte %   Date Value Ref Range Status   05/06/2024 6.9 (L) 19.6 - 45.3 % Final     Monocyte %   Date Value Ref Range Status   05/06/2024 6.5 5.0 - 12.0 % Final     Eosinophil %   Date Value Ref Range Status   05/06/2024 0.6 0.3 - 6.2 % Final     Basophil %   Date Value Ref Range Status   05/06/2024 0.7 0.0 - 1.5 % Final     Immature Grans %   Date Value Ref Range Status   05/06/2024 0.2 0.0 - 0.5 % Final     Neutrophils, Absolute   Date Value Ref Range Status   05/06/2024 7.51 (H) 1.70 - 7.00 10*3/mm3 Final     Lymphocytes, Absolute   Date Value Ref Range Status   05/06/2024 0.61 (L) 0.70 - 3.10 10*3/mm3 Final     Monocytes, Absolute   Date Value Ref Range Status   05/06/2024 0.57 0.10 - 0.90 10*3/mm3 Final     Eosinophils, Absolute   Date Value Ref Range Status   05/06/2024 0.05 0.00 - 0.40 10*3/mm3 Final     Basophils, Absolute   Date Value Ref Range Status   05/06/2024 0.06 0.00 - 0.20 10*3/mm3 Final     Immature Grans, Absolute   Date Value Ref Range Status   05/06/2024 0.02 0.00 - 0.05 10*3/mm3 Final     nRBC   Date Value Ref Range Status   05/06/2024 0.0 0.0 - 0.2 /100 WBC Final         BMP:    Lab Results   Component Value Date    GLUCOSE 109 (H) 05/06/2024    BUN 19 05/06/2024    CREATININE 1.05 (H) 05/06/2024    EGFRIFNONA 46 (L) 09/30/2021    BCR 18.1 05/06/2024    K 4.1 05/06/2024    CO2 22.2 05/06/2024    CALCIUM 8.6 05/06/2024    ALBUMIN 4.0 05/06/2024    LABIL2 1.8 04/22/2019    AST 31 05/06/2024    ALT 13 05/06/2024        MRI Brain Without Contrast    Result Date: 5/6/2024  Impression:  1. Mild generalized parenchymal volume loss. 2. No acute intracranial abnormality. Specifically no evidence of a cute infarct or hemorrhage.    Electronically Signed By-Miguelito Luis MD On:5/6/2024 4:59 PM      XR Chest 1 View    Result Date: 5/6/2024  Impression:  1. Chronic advanced emphysematous lung disease. 2. No acute process identified.   Electronically Signed By-Christian Handley MD On:5/6/2024 7:54 AM              Assessment & Plan   Assessment / Plan       Current Diagnosis:  Active Hospital Problems    Diagnosis    • **Generalized weakness    • Chest pain, atypical    • Generalized anxiety disorder    • Recurrent falls      Plan:   Patient comes in with multiple issues including chest pain tremors weakness fatigue recurrent falls.  Patient is tachycardic.  Cardiac enzymes negative so far.  Will admit her to hospital.  Consult neurologist to rule out Parkinson's disease.  Patient has recurrent falls and tremors.  Remains tachycardic.  Will check thyroid profile.  Anxiolytics as needed.  2D echo.  Discussed with patient's son.  Further management will be based on clinical course, lab results and consultant input.        DVT prophylaxis:  Medical and mechanical DVT prophylaxis orders are present.        GI Prophylaxis:       Pepcid    CODE STATUS:    Code Status (Patient has no pulse and is not breathing): CPR (Attempt to Resuscitate)  Medical Interventions (Patient has pulse or is breathing): Full Support    Admission Status:  I believe this patient meets inpatient status.             I have  dictated this note utilizing Dragon Dictation.             Please note that portions of this note were completed with a voice recognition program.             Part of this note may be an electronic transcription/translation of spoken language to printed text         using the Dragon Dictation System.       Electronically signed by Lamont Prince MD, 05/06/24, 7:16 PM EDT.    Total time spent with in evaluation and management:

## 2024-05-07 PROBLEM — R62.7 FAILURE TO THRIVE IN ADULT: Chronic | Status: ACTIVE | Noted: 2024-05-07

## 2024-05-07 PROBLEM — R00.0 TACHYCARDIA: Status: ACTIVE | Noted: 2024-05-07

## 2024-05-07 PROBLEM — G20.A1 PARKINSON DISEASE: Status: ACTIVE | Noted: 2024-05-07

## 2024-05-07 LAB
BACTERIA SPEC AEROBE CULT: NORMAL
BH CV ECHO MEAS - AO ROOT DIAM: 2.9 CM
BH CV ECHO MEAS - EDV(CUBED): 65 ML
BH CV ECHO MEAS - EDV(MOD-SP4): 49.8 ML
BH CV ECHO MEAS - EF(MOD-SP4): 54.8 %
BH CV ECHO MEAS - ESV(CUBED): 19.5 ML
BH CV ECHO MEAS - ESV(MOD-SP4): 22.5 ML
BH CV ECHO MEAS - FS: 33.1 %
BH CV ECHO MEAS - IVS/LVPW: 0.85 CM
BH CV ECHO MEAS - IVSD: 0.76 CM
BH CV ECHO MEAS - LA DIMENSION: 2.6 CM
BH CV ECHO MEAS - LAT PEAK E' VEL: 8.5 CM/SEC
BH CV ECHO MEAS - LV MASS(C)D: 99 GRAMS
BH CV ECHO MEAS - LVIDD: 4 CM
BH CV ECHO MEAS - LVIDS: 2.7 CM
BH CV ECHO MEAS - LVOT AREA: 3.1 CM2
BH CV ECHO MEAS - LVOT DIAM: 2 CM
BH CV ECHO MEAS - LVPWD: 0.9 CM
BH CV ECHO MEAS - MED PEAK E' VEL: 5.6 CM/SEC
BH CV ECHO MEAS - MV A MAX VEL: 75.3 CM/SEC
BH CV ECHO MEAS - MV DEC SLOPE: 403 CM/SEC2
BH CV ECHO MEAS - MV DEC TIME: 0.17 SEC
BH CV ECHO MEAS - MV E MAX VEL: 69.8 CM/SEC
BH CV ECHO MEAS - MV E/A: 0.93
BH CV ECHO MEAS - RVDD: 2.15 CM
BH CV ECHO MEAS - SV(MOD-SP4): 27.3 ML
BH CV ECHO MEASUREMENTS AVERAGE E/E' RATIO: 9.9

## 2024-05-07 PROCEDURE — 25010000002 ENOXAPARIN PER 10 MG: Performed by: INTERNAL MEDICINE

## 2024-05-07 PROCEDURE — 97161 PT EVAL LOW COMPLEX 20 MIN: CPT

## 2024-05-07 PROCEDURE — 97165 OT EVAL LOW COMPLEX 30 MIN: CPT

## 2024-05-07 RX ORDER — CLONAZEPAM 0.5 MG/1
0.25 TABLET ORAL 2 TIMES DAILY PRN
Status: DISCONTINUED | OUTPATIENT
Start: 2024-05-07 | End: 2024-05-13 | Stop reason: HOSPADM

## 2024-05-07 RX ADMIN — CARBIDOPA AND LEVODOPA 1 TABLET: 25; 100 TABLET ORAL at 08:50

## 2024-05-07 RX ADMIN — POTASSIUM CHLORIDE, DEXTROSE MONOHYDRATE AND SODIUM CHLORIDE 100 ML/HR: 150; 5; 900 INJECTION, SOLUTION INTRAVENOUS at 03:51

## 2024-05-07 RX ADMIN — FAMOTIDINE 40 MG: 20 TABLET ORAL at 08:50

## 2024-05-07 RX ADMIN — CARBIDOPA AND LEVODOPA 1 TABLET: 25; 100 TABLET ORAL at 15:49

## 2024-05-07 RX ADMIN — CLONAZEPAM 0.5 MG: 0.5 TABLET ORAL at 08:51

## 2024-05-07 RX ADMIN — ENOXAPARIN SODIUM 40 MG: 100 INJECTION SUBCUTANEOUS at 08:49

## 2024-05-07 RX ADMIN — POTASSIUM CHLORIDE, DEXTROSE MONOHYDRATE AND SODIUM CHLORIDE 100 ML/HR: 150; 5; 900 INJECTION, SOLUTION INTRAVENOUS at 14:09

## 2024-05-07 RX ADMIN — Medication 10 ML: at 20:24

## 2024-05-07 RX ADMIN — ALPRAZOLAM 0.25 MG: 0.25 TABLET ORAL at 20:23

## 2024-05-07 RX ADMIN — METOPROLOL SUCCINATE 25 MG: 50 TABLET, EXTENDED RELEASE ORAL at 08:50

## 2024-05-07 RX ADMIN — CARBIDOPA AND LEVODOPA 1 TABLET: 25; 100 TABLET ORAL at 20:23

## 2024-05-07 NOTE — PLAN OF CARE
Goal Outcome Evaluation:  Plan of Care Reviewed With: patient           Outcome Evaluation: Pt A & O, VSS. Pt did not complain of pain during shift. Pt did not ambulate. IV fluids infusing, continuing plan of care.

## 2024-05-07 NOTE — PLAN OF CARE
Goal Outcome Evaluation: VSS, mentation labile, consistently oriented to person, moments of confusion, trying to go home. Bed alert in place. Urinary incontinence X2, primafit placed.

## 2024-05-07 NOTE — CONSULTS
[facility]   Consult Note      Patient Name: Devi Bobo  : 1945  MRN: [id number]  Primary Care Physician:  Raad Del Toro MD  Date of admission: 2024    Subjective   Subjective     This 79 years old woman was seen upon the request of Dr. BESS Prince for evaluation.    Chief Complaint:   Weakness  Tremors    HPI:  It was very difficult to so obtain history from this woman. She seemed to have difficulty in understanding spoken words.  She does not answer questions directly.  I have to repeat the question 2-4 times where she would answer.    She dated the onset of illness sometime before 2023 when she started getting weak at the same time, she was having tremors.  By early , she still having trouble walking.  She could not describe to me her difficulties.  All of this have been persistent.    Review of Systems  There is no headache, dizziness, nausea or vomiting.  No chest pains or abdominal pains.  She was not incontinent of her urine.      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)     Hypertension     Lesion of sciatic nerve, bilateral lower limbs     Osteoporosis     Overactive bladder     Piriformis syndrome     Polyp of colon     Postmenopausal atrophic vaginitis     Vaginal atrophy        Past Surgical History:   Procedure Laterality Date    COLONOSCOPY  2019    LEEP N/A 2022    Procedure: LOOP ELECTROCAUTERY EXCISION PROCEDURE;  Surgeon: El Álvarez MD;  Location: [facility] OR;  Service: Gynecology;  Laterality: N/A;    TUBAL ABDOMINAL LIGATION         Family History: family history includes Colon cancer in her sister; Heart disease in her father, mother,  and sister; Thyroid disease in her mother and sister. Otherwise pertinent FHx was reviewed and not pertinent to current issue.    Social History:  reports that she has never smoked. She has never used smokeless tobacco. She reports that she does not currently use alcohol. She reports that she does not currently use drugs.    Psychosocial History: No reported psychiatric difficulties    Home Medications:  Calcium Carbonate-Vitamin D, HYDROcodone-acetaminophen, denosumab, estradiol, gabapentin, lisinopril, and multivitamin with minerals      Allergies:  Allergies   Allergen Reactions    Sulfa Antibiotics Anaphylaxis    Nitrofurantoin Confusion       Vitals:   Temp:  [98.1 °F (36.7 °C)-100 °F (37.8 °C)] 98.1 °F (36.7 °C)  Heart Rate:  [] 88  Resp:  [18-21] 19  BP: (132-153)/(73-95) 147/81  Physical Exam   She was awake and alert was not in any form of distress.  She was practically skin and bones.  She is emaciated.    Her heart was regular but heart rate was 60/min there were no murmurs.  The lungs were clear.    The carotid pulses were 1+ and equal.  There were no bruits on either side.    Neurological Examination:  She does not answer questions directly.  However, when she responds to the questions, her responses were coherent and relevant.  She was able to understand and follow verbal commands.    I did not look into the fundus on either side because of the COVID-19 pandemic social distancing.    The pupils were 3 to 4 mm. They were round and equal reactive to light directly and consensually.  There was no extraocular muscle weakness.    No facial asymmetry.  No facial weakness.  Was able to hear normal conversational speech.    The strength of the sternomastoid and trapezius muscles was normal and symmetrical.  The uvula and the tongue were in the midline.    The strength in both upper and lower extremities were symmetrically weak.  There were 4+5.    Felt pinprick equal in both sides of the forehead, face,  lower jaw, both upper and lower extremities, and both sides of her trunk.    The deep tendon reflexes were absent on both sides    She was not able to the finger-to-nose test on both sides.      Result Review    Result Review:  I have personally reviewed the results from the time of this admission to 5/6/2024 20:20 EDT and agree with these findings:  []  Laboratory  []  Microbiology  [x]  Radiology  []  EKG/Telemetry   []  Cardiology/Vascular   []  Pathology  []  Old records  []  Other:  Most notable findings include:   May 6, 2024  I reviewed the digital images of the MRI of her brain that was done on May 6, 2024.  This study showed no acute changes.  There is mild subcortical and periventricular white matter changes consistent with old microvascular ischemia.  There is mild diffuse symmetric cortical atrophy.    Impression:    Generalized Weakness  Parkinson Disease  Malnutrition    Plan:   Will try her on Sinemet and see how she does      Please note that portions of this note were completed with a voice recognition program.  Part of this note is an electric or electronic transcription/translation of spoken language to printed text using the dragon dictating system.    Electronically signed by Hayder Doe Jr., MD, 05/06/24, 8:20 PM EDT.

## 2024-05-07 NOTE — CONSULTS
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 PTA, pt reports not eating well 2/2 illness. Currently at time of visit, pt hadn't started breakfast. Note pt does need her dentures to eat meals. Discussed need for texture modification at this time, but pt refused at this time. Discussed ONS Boost Plus TID to aid in caloric intake; pt agreed-chocolate only. No NKFA. No n/v/c/d complaints. Pt son pt -106# and has noticed 5# wt loss in the last month 2/2 illness. RD to continue to monitor.      Anthropometrics        Current Height, Weight Height: 167.6 cm (66\")  Weight: 45.8 kg (100 lb 15.5 oz)   Current BMI Body mass index is 16.3 kg/m².   BMI Classification Underweight   % IBW 76%   Adjusted Body Weight (ABW)    Weight Hx  Wt Readings from Last 30 Encounters:   05/06/24 1537 45.8 kg (100 lb 15.5 oz)   05/06/24 0726 45.8 kg (101 lb)   04/29/24 1400 46.8 kg (103 lb 2.8 oz)   04/15/24 " "1500 48.2 kg (106 lb 4.2 oz)   03/14/24 1308 46.7 kg (103 lb)   01/03/24 1612 48.6 kg (107 lb 2.3 oz)   12/11/23 1931 48.5 kg (107 lb)   08/08/23 1500 48.8 kg (107 lb 8.6 oz)   05/20/23 0647 48.2 kg (106 lb 4.2 oz)   05/08/23 1316 48.8 kg (107 lb 9.4 oz)   04/20/23 1039 48.4 kg (106 lb 11.2 oz)   04/03/23 0626 48.1 kg (106 lb 0.7 oz)   04/02/23 0702 48.2 kg (106 lb 4.2 oz)   04/01/23 0218 42.7 kg (94 lb 2.2 oz)   03/31/23 2058 49 kg (108 lb 0.4 oz)   10/13/22 0935 52.1 kg (114 lb 12.8 oz)   09/21/22 1410 52.9 kg (116 lb 10 oz)   01/19/22 1559 53.5 kg (118 lb)   01/06/22 0853 53.9 kg (118 lb 13.3 oz)   12/01/21 1400 54 kg (119 lb)   10/20/21 1109 52.5 kg (115 lb 12.8 oz)   10/05/21 1147 52.8 kg (116 lb 6.4 oz)   12/15/20 0000 55.1 kg (121 lb 8 oz)   07/03/18 0000 52.7 kg (116 lb 2 oz)          Wt Change Observation Note wt trends trending down per EMR and pt/pt son reports wt loss in the last month.     Estimated/Assessed Needs  Estimated Needs based on: Ideal Body Weight       Energy Requirements 20-25 kcal/kg    EST Needs (kcal/day) 5741-0607 kca/d        Protein Requirements 0.8-1.1 g/kg    EST Daily Needs (g/day) 47-65 g/d        Fluid Requirements 1 ml/kcal    Estimated Needs (mL/day) 6476-2083 mL/d      Labs/Medications         Pertinent Labs Reviewed.   Results from last 7 days   Lab Units 05/06/24  0812   SODIUM mmol/L 141   POTASSIUM mmol/L 4.1   CHLORIDE mmol/L 107   CO2 mmol/L 22.2   BUN mg/dL 19   CREATININE mg/dL 1.05*   CALCIUM mg/dL 8.6   BILIRUBIN mg/dL 0.4   ALK PHOS U/L 80   ALT (SGPT) U/L 13   AST (SGOT) U/L 31   GLUCOSE mg/dL 109*     Results from last 7 days   Lab Units 05/06/24  0812   MAGNESIUM mg/dL 2.2   HEMOGLOBIN g/dL 11.5*   HEMATOCRIT % 35.2     COVID19   Date Value Ref Range Status   05/20/2023 Detected (C) Not Detected - Ref. Range Final     No results found for: \"HGBA1C\"      Pertinent Medications Reviewed.     Malnutrition Severity Assessment      Patient meets criteria for : Severe " Malnutrition  Malnutrition Type (Last 8 Hours)       Malnutrition Severity Assessment       Row Name 05/07/24 1447       Malnutrition Severity Assessment    Malnutrition Type Chronic Disease - Related Malnutrition      Row Name 05/07/24 1447       Muscle Loss    Loss of Muscle Mass Findings Severe    [place] Severe - deep hollowing/scooping, lack of muscle to touch, facial bones well defined    Clavicle Bone Region Severe - protruding prominent bone      Row Name 05/07/24 1447       Fat Loss    Subcutaneous Fat Loss Findings Severe    Orbital Region  Severe - pronounced hollowness/depression, dark circles, loose saggy skin      Row Name 05/07/24 1447       Criteria Met (Must meet criteria for severity in at least 2 of these categories: M Wasting, Fat Loss, Fluid, Secondary Signs, Wt. Status, Intake)    Patient meets criteria for  Severe Malnutrition                     Nutrition Diagnosis         Nutrition Dx Problem 1 Severe malnutrition related to  chronic illness  as evidenced by  severe subcutaneous fat loss of the triceps region and severe muscle loss of the temporal and orbital region     Nutrition Intervention           Current Nutrition Orders & Evaluation of Intake       Current PO Diet Diet: Cardiac, Diabetic; Healthy Heart (2-3 Na+); Consistent Carbohydrate; Fluid Consistency: Thin (IDDSI 0)   Supplement Orders Placed This Encounter      Dietary Nutrition Supplements Boost Plus (Ensure Plus); chocolate           Nutrition Intervention/Prescription        Recommend to continue current diet order, if poor PO continues, recommend to liberalize diet   Recommend Boost TID (360 kcal, 14g PRO/each) to aid in caloric intake         Medical Nutrition Therapy/Nutrition Education          Learner     Readiness N/A       Method     Response      Monitor/Evaluation        Monitor 1. Recommend to monitor PO/ONS intake, wt trends, and labs      Nutrition Discharge Plan         To be determined     Electronically  signed by:  Monalisa Thomas RD  05/07/24 14:52 EDT

## 2024-05-07 NOTE — THERAPY EVALUATION
Acute Care - Physical Therapy Initial Evaluation   Pradeep     Patient Name: Devi Bobo  : 1945  MRN: [id number]  Today's Date: 2024      Visit Dx:     ICD-10-CM ICD-9-CM   1. Acute UTI (urinary tract infection)  N39.0 599.0   2. Generalized weakness  R53.1 780.79   3. Chest pain, unspecified type  R07.9 786.50   4. Unsteady gait  R26.81 781.2   5. Difficulty walking  R26.2 719.7     Patient Active Problem List   Diagnosis    Bronchitis    Cataract    Chronic pelvic pain in female    Degeneration of lumbar intervertebral disc    Encounter for long-term (current) use of insulin    Frequency of micturition    Herniation of intervertebral disc    High blood pressure    HTN (hypertension)    Lumbar spondylosis    Nocturia    Osteoporosis    Pain, generalized    Urge incontinence    Bladder disorder    Female bladder prolapse    Urinary incontinence    Urinary urgency    Piriformis syndrome of both sides    Vaginal atrophy    Atypical squamous cells cannot exclude high grade squamous intraepithelial lesion on cytologic smear of cervix (ASC-H)    High grade squamous intraepithelial lesion (HGSIL) on cytologic smear of cervix    Sciatic nerve lesion    Osteoarthritis    Lumbosacral spondylosis without myelopathy    Chronic vertigo    Chronic fatigue syndrome    Cervical spondylosis without myelopathy    Abnormal gait    Dysplasia of cervix, low grade (CASH 1)    Postoperative visit    Postmenopausal osteoporosis    Sepsis, due to unspecified organism, unspecified whether acute organ dysfunction present    Severe malnutrition    Weakness    COVID-19    Pharyngitis    Severe malnutrition    Cytokine release syndrome, grade 1    Generalized weakness    Chest pain, atypical    Generalized anxiety disorder    Recurrent falls    Tachycardia     Past Medical History:   Diagnosis Date    Age-related osteoporosis without current pathological fracture     Allergy     SULFA DRUGS   MILD    Atypical squamous cells  cannot exclude high grade squamous intraepithelial lesion on cytologic smear of cervix (ASC-H)     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)     Hypertension     Lesion of sciatic nerve, bilateral lower limbs     Osteoporosis     Overactive bladder     Piriformis syndrome     Polyp of colon     Postmenopausal atrophic vaginitis     Vaginal atrophy      Past Surgical History:   Procedure Laterality Date    COLONOSCOPY  2019    LEEP N/A 1/6/2022    Procedure: LOOP ELECTROCAUTERY EXCISION PROCEDURE;  Surgeon: El Álvarez MD;  Location: [facility] MAIN OR;  Service: Gynecology;  Laterality: N/A;    TUBAL ABDOMINAL LIGATION       PT Assessment (Last 12 Hours)       PT Evaluation and Treatment       Row Name 05/07/24 0951 05/07/24 0906       Physical Therapy Time and Intention    Subjective Information no complaints  -DP --    Document Type evaluation  -DP --    Mode of Treatment individual therapy;physical therapy  -DP --    Patient Effort good  -DP adequate  -DP      Row Name 05/07/24 0951 05/07/24 0906       General Information    Patient Profile Reviewed yes  -DP --    Patient Observations alert;cooperative  -DP --    General Observations of Patient According ot son-pt has progressively declined in last few weeks going from being able to ambulate idnependently to using a RW to using a WC.  -DP --    Prior Level of Function independent:;gait;transfer;bed mobility;ADL's  -DP --    Equipment Currently Used at Home none  -DP none  -DP    Existing Precautions/Restrictions fall  -DP --    Barriers to Rehab none identified  -DP --      Row Name 05/07/24 0951          Living Environment    Current Living Arrangements home  -DP     Home Accessibility wheelchair accessible  -DP     People in Home child(barry), adult  -DP     Primary Care Provided by self  -DP       Row Name 05/07/24 0951          Cognition    Follows Commands (Cognition) follows one-step commands;increased processing  time needed;delayed response/completion;repetition of directions required;verbal cues/prompting required  -DP       Row Name 05/07/24 0951          Range of Motion (ROM)    Range of Motion bilateral lower extremities;ROM is WFL  -DP       Row Name 05/07/24 0951          Strength Comprehensive (MMT)    General Manual Muscle Testing (MMT) Assessment lower extremity strength deficits identified  -DP     Comment, General Manual Muscle Testing (MMT) Assessment unable to formally assess  -DP       Row Name 05/07/24 0951          Bed Mobility    Bed Mobility supine-sit-supine  -DP     Supine-Sit-Supine [place] (Bed Mobility) moderate assist (50% patient effort)  -DP       Row Name 05/07/24 0951          Transfers    Transfers sit-stand transfer  -DP       Row Name 05/07/24 0951          Sit-Stand Transfer    Sit-Stand [place] (Transfers) minimum assist (75% patient effort)  -DP     Assistive Device (Sit-Stand Transfers) walker, front-wheeled  Pt was pulling on the walker  -DP     Comment, (Sit-Stand Transfer) pt tends to leand backwards upon standing up- unablet ot shift her weight forward  -DP       Row Name 05/07/24 0951          Gait/Stairs (Locomotion)    Comment, (Gait/Stairs) unable to ambulate  -DP       Row Name 05/07/24 0951          Balance    Balance Assessment sitting static balance;standing static balance  -DP     Static Sitting Balance minimal assist  -DP     Static Standing Balance minimal assist  -DP       Row Name 05/07/24 0951          Plan of Care Review    Plan of Care Reviewed With patient  -DP     Outcome Evaluation Pt presents with decreased strength, transfers and functional mobility. Will benefit from inpatient PT services and continued PT services upon discharge.  -DP       Row Name 05/07/24 0951          Therapy Assessment/Plan (PT)    Rehab Potential (PT) good, to achieve stated therapy goals  -DP     Criteria for Skilled Interventions Met (PT) yes;meets criteria  -DP     Therapy  Frequency (PT) daily  -DP     Predicted Duration of Therapy Intervention (PT) 10 days  -DP     Problem List (PT) problems related to;mobility  -DP     Activity Limitations Related to Problem List (PT) unable to transfer safely;unable to ambulate safely  -DP       Row Name 05/07/24 0951          PT Evaluation Complexity    History, PT Evaluation Complexity no personal factors and/or comorbidities  -DP     Examination of Body Systems (PT Eval Complexity) total of 4 or more elements  -DP     Clinical Presentation (PT Evaluation Complexity) stable  -DP     Clinical Decision Making (PT Evaluation Complexity) low complexity  -DP     Overall Complexity (PT Evaluation Complexity) low complexity  -DP       Row Name 05/07/24 0951          Physical Therapy Goals    Transfer Goal Selection (PT) transfer, PT goal 1  -DP     Gait Training Goal Selection (PT) gait training, PT goal 1  -DP       Row Name 05/07/24 0951          Transfer Goal 1 (PT)    Activity/Assistive Device (Transfer Goal 1, PT) sit-to-stand/stand-to-sit  -DP     [place] Level/Cues Needed (Transfer Goal 1, PT) supervision required  -DP     Time Frame (Transfer Goal 1, PT) 10 days  -DP       Row Name 05/07/24 0951          Gait Training Goal 1 (PT)    Activity/Assistive Device (Gait Training Goal 1, PT) assistive device use;walker, rolling  -DP     [place] Level (Gait Training Goal 1, PT) supervision required  -DP     Distance (Gait Training Goal 1, PT) 200  -DP     Time Frame (Gait Training Goal 1, PT) 10 days  -DP               User Key  (r) = Recorded By, (t) = Taken By, (c) = Cosigned By      Initials Name Provider Type    Handy Jules, PT Physical Therapist                      PT Recommendation and Plan  Anticipated Discharge Disposition (PT): sub acute care setting  Planned Therapy Interventions (PT): balance training, bed mobility training, gait training, strengthening, transfer training  Therapy Frequency (PT): daily  Plan of Care Reviewed  With: patient  Outcome Evaluation: Pt presents with decreased strength, transfers and functional mobility. Will benefit from inpatient PT services and continued PT services upon discharge.   Outcome Measures       Row Name 05/07/24 0900             How much help from another person do you currently need...    Turning from your back to your side while in flat bed without using bedrails? 3  -DP      Moving from lying on back to sitting on the side of a flat bed without bedrails? 2  -DP      Moving to and from a bed to a chair (including a wheelchair)? 3  -DP      Standing up from a chair using your arms (e.g., wheelchair, bedside chair)? 3  -DP      Climbing 3-5 steps with a railing? 1  -DP      To walk in hospital room? 2  -DP      AM-PAC 6 Clicks Score (PT) 14  -DP      Highest Level of Mobility Goal 4 --> Transfer to chair/commode  -DP         Functional Assessment    Outcome Measure Options AM-PAC 6 Clicks Basic Mobility (PT)  -DP                User Key  (r) = Recorded By, (t) = Taken By, (c) = Cosigned By      Initials Name Provider Type    Handy Jules, PT Physical Therapist                     Time Calculation:    PT Charges       Row Name 05/07/24 0955             Time Calculation    PT Received On 05/07/24  -DP      PT Goal Re-Cert Due Date 05/16/24  -DP         Untimed Charges    PT Eval/Re-eval Minutes 40  -DP         Total Minutes    Untimed Charges Total Minutes 40  -DP       Total Minutes 40  -DP                User Key  (r) = Recorded By, (t) = Taken By, (c) = Cosigned By      Initials Name Provider Type    Handy Jules, PT Physical Therapist                      PT G-Codes  Outcome Measure Options: AM-PAC 6 Clicks Basic Mobility (PT)  AM-PAC 6 Clicks Score (PT): 14    Handy Gabriel, PT  5/7/2024

## 2024-05-07 NOTE — PLAN OF CARE
Goal Outcome Evaluation:  Plan of Care Reviewed With: patient        Progress: no change  Outcome Evaluation: Patient presents with limitations in self-care, functional transfers, balance, and endurance. She would benefit from continued skilled occupational therapy services to maximize independence with ADLs/functional transfers.      Anticipated Discharge Disposition (OT): sub acute care setting

## 2024-05-07 NOTE — PROGRESS NOTES
[facility]     Progress Note             Patient Name: Devi Bobo  : 1945  MRN: [id number]  Primary Care Physician:  Raad Del Toro MD  Date of admission: 2024        Present illness:  Her son and nephew are at the bedside.    She is doing well.  She is awake and more alert.  The tremors are essentially unchanged.  The cogwheel rigidity is not any better.  However, she is moving both upper extremities better than yesterday.    Her heart and lungs were unremarkable.    Review of Systems  No headache, dizziness, nausea or vomiting there is no chest pains or abdominal pains.      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)     Hypertension     Lesion of sciatic nerve, bilateral lower limbs     Osteoporosis     Overactive bladder     Piriformis syndrome     Polyp of colon     Postmenopausal atrophic vaginitis     Vaginal atrophy        Past Surgical History:   Procedure Laterality Date    COLONOSCOPY  2019    LEEP N/A 2022    Procedure: LOOP ELECTROCAUTERY EXCISION PROCEDURE;  Surgeon: El Álvarez MD;  Location: [facility];  Service: Gynecology;  Laterality: N/A;    TUBAL ABDOMINAL LIGATION         Family History: family history includes Colon cancer in her sister; Heart disease in her father, mother, and sister; Thyroid disease in her mother and sister. Otherwise pertinent FHx was reviewed and not pertinent to current issue.    Social History:  reports that she has never smoked. She has never used smokeless tobacco. She reports that she does not currently use alcohol. She reports that she does not currently use drugs.      Home Medications:  Calcium Carbonate-Vitamin D,  HYDROcodone-acetaminophen, denosumab, estradiol, gabapentin, lisinopril, and multivitamin with minerals      Allergies:  Allergies   Allergen Reactions    Sulfa Antibiotics Anaphylaxis    Nitrofurantoin Confusion       Vitals:   Temp:  [97 °F (36.1 °C)-98.3 °F (36.8 °C)] 98.1 °F (36.7 °C)  Heart Rate:  [74-88] 85  Resp:  [18-20] 20  BP: (140-154)/(68-86) 147/68      Result Review:  I have personally reviewed the results from the time of this admission to 5/7/2024 19:51 EDT and agree with these findings:  []  Laboratory  []  Microbiology  []  Radiology  []  EKG/Telemetry   []  Cardiology/Vascular   []  Pathology  []  Old records  []  Other:  Most notable findings include:   May 6, 2024  I reviewed the digital images of the MRI of her brain that was done on May 6, 2024.  This study showed no acute changes.  There is mild subcortical and periventricular white matter changes consistent with old microvascular ischemia.  There is mild diffuse symmetric cortical atrophy.     Impression:    Generalized Weakness  Parkinson Disease  Malnutrition     Plan:   Continue Sinemet, physical and Occupational Therapy.          Electronically signed by Hayder Doe Jr., MD, 05/07/24, 7:51 PM EDT.        Please note that portions of this note were completed with a voice recognition program.  Part of this note is an electric or electronic transcription/translation of spoken language to printed text using the dragon dictating system.

## 2024-05-07 NOTE — THERAPY EVALUATION
Patient Name: Devi Bobo  : 1945    MRN: [id number]                              Today's Date: 2024       Admit Date: 2024    Visit Dx:     ICD-10-CM ICD-9-CM   1. Acute UTI (urinary tract infection)  N39.0 599.0   2. Generalized weakness  R53.1 780.79   3. Chest pain, unspecified type  R07.9 786.50   4. Unsteady gait  R26.81 781.2   5. Difficulty walking  R26.2 719.7   6. Decreased activities of daily living (ADL)  Z78.9 V49.89     Patient Active Problem List   Diagnosis    Bronchitis    Cataract    Chronic pelvic pain in female    Degeneration of lumbar intervertebral disc    Encounter for long-term (current) use of insulin    Frequency of micturition    Herniation of intervertebral disc    High blood pressure    HTN (hypertension)    Lumbar spondylosis    Nocturia    Osteoporosis    Pain, generalized    Urge incontinence    Bladder disorder    Female bladder prolapse    Urinary incontinence    Urinary urgency    Piriformis syndrome of both sides    Vaginal atrophy    Atypical squamous cells cannot exclude high grade squamous intraepithelial lesion on cytologic smear of cervix (ASC-H)    High grade squamous intraepithelial lesion (HGSIL) on cytologic smear of cervix    Sciatic nerve lesion    Osteoarthritis    Lumbosacral spondylosis without myelopathy    Chronic vertigo    Chronic fatigue syndrome    Cervical spondylosis without myelopathy    Abnormal gait    Dysplasia of cervix, low grade (CASH 1)    Postoperative visit    Postmenopausal osteoporosis    Sepsis, due to unspecified organism, unspecified whether acute organ dysfunction present    Severe malnutrition    Weakness    COVID-19    Pharyngitis    Severe malnutrition    Cytokine release syndrome, grade 1    Generalized weakness    Chest pain, atypical    Generalized anxiety disorder    Recurrent falls    Tachycardia    Parkinson disease    Failure to thrive in adult     Past Medical History:   Diagnosis Date    Age-related  osteoporosis without current pathological fracture     Allergy     SULFA DRUGS   MILD    Atypical squamous cells cannot exclude high grade squamous intraepithelial lesion on cytologic smear of cervix (ASC-H)     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)     Hypertension     Lesion of sciatic nerve, bilateral lower limbs     Osteoporosis     Overactive bladder     Piriformis syndrome     Polyp of colon     Postmenopausal atrophic vaginitis     Vaginal atrophy      Past Surgical History:   Procedure Laterality Date    COLONOSCOPY  2019    LEEP N/A 1/6/2022    Procedure: LOOP ELECTROCAUTERY EXCISION PROCEDURE;  Surgeon: El Álvarez MD;  Location: [facility] MAIN OR;  Service: Gynecology;  Laterality: N/A;    TUBAL ABDOMINAL LIGATION        General Information       Row Name 05/07/24 1452          OT Time and Intention    Document Type evaluation  -     Mode of Treatment individual therapy;occupational therapy  -       Row Name 05/07/24 1452          General Information    Patient Profile Reviewed yes  -     Prior Level of Function --  Pt uncooperative and poor historian. Per EMR she has been unable to ambulate or complete ADLs for the last couple of weeks, has been going to outpatient PT, and has fallen several times.  -     Existing Precautions/Restrictions fall  -     Barriers to Rehab none identified  -       Row Name 05/07/24 1452          Occupational Profile    Reason for Services/Referral (Occupational Profile) Patient is a 79 year old female admitted to [facility] for chest pain, anxiety, palpitations, and recurrent falls on May 6th, 2024. Occupational therapy consulted due to recent decline in ADLs/functional transfers. No previous occupational therapy services for current condition.  -       Row Name 05/07/24 1452          Living Environment    People in Home child(barry), adult  son  -       Row Name 05/07/24 1452          Cognition     Orientation Status (Cognition) oriented to;person  -[facility] Name 05/07/24 1452          Safety Issues, Functional Mobility    Safety Issues Affecting Function (Mobility) ability to follow commands;awareness of need for assistance;impulsivity;insight into deficits/self-awareness;judgment;safety precaution awareness;sequencing abilities  -     Impairments Affecting Function (Mobility) balance;cognition;endurance/activity tolerance  -               User Key  (r) = Recorded By, (t) = Taken By, (c) = Cosigned By      Initials Name Provider Type     Erin Flanagan OT Occupational Therapist                     Mobility/ADL's       [facility] Name 05/07/24 1455          Bed Mobility    Bed Mobility supine-sit;sit-supine  -     Supine-Sit [place] (Bed Mobility) moderate assist (50% patient effort)  -     Sit-Supine [place] (Bed Mobility) moderate assist (50% patient effort)  -     Bed Mobility, Safety Issues cognitive deficits limit understanding;decreased use of arms for pushing/pulling;decreased use of legs for bridging/pushing  -     Comment, (Bed Mobility) Pt adamantly refused further functional transfers. Per PT evaluation she required min assist for sit to stand using RW.  -[facility] Name 05/07/24 1455          Activities of Daily Living    BADL Assessment/Intervention bathing;upper body dressing;lower body dressing;grooming;feeding;toileting  -[facility] Name 05/07/24 1455          Bathing Assessment/Intervention    [place] Level (Bathing) bathing skills;upper body;lower body;maximum assist (25% patient effort);dependent (less than 25% patient effort)  -[facility] Name 05/07/24 1455          Upper Body Dressing Assessment/Training    [place] Level (Upper Body Dressing) upper body dressing skills;maximum assist (25% patient effort)  -LF       Row Name 05/07/24 1455          Lower Body Dressing Assessment/Training    [place] Level (Lower Body Dressing) lower body dressing  skills;maximum assist (25% patient effort);dependent (less than 25% patient effort)  -[facility] Name 05/07/24 1455          Grooming Assessment/Training    [place] Level (Grooming) grooming skills;maximum assist (25% patient effort)  -[facility] Name 05/07/24 1455          Self-Feeding Assessment/Training    [place] Level (Feeding) feeding skills;maximum assist (25% patient effort)  -LF       Row Name 05/07/24 1455          Toileting Assessment/Training    [place] Level (Toileting) toileting skills;maximum assist (25% patient effort);dependent (less than 25% patient effort)  -               User Key  (r) = Recorded By, (t) = Taken By, (c) = Cosigned By      Initials Name Provider Type     Erin Flanagan OT Occupational Therapist                   Obj/Interventions       [facility] Name 05/07/24 1456          Sensory Assessment (Somatosensory)    Sensory Assessment (Somatosensory) UE sensation intact  -LF       Row Name 05/07/24 1456          Vision Assessment/Intervention    Visual Impairment/Limitations WFL  -LF       Row Name 05/07/24 1456          Range of Motion Comprehensive    General Range of Motion bilateral upper extremity ROM WFL  -LF       Row Name 05/07/24 1456          Strength Comprehensive (MMT)    Comment, General Manual Muscle Testing (MMT) Assessment 4/5 BUEs  -LF       Row Name 05/07/24 1456          Motor Skills    Motor Skills coordination;functional endurance  -LF     Coordination bilateral;upper extremity;WFL  -LF     Functional Endurance Poor+  -LF       Row Name 05/07/24 1456          Balance    Balance Assessment sitting static balance  -LF     Static Sitting Balance moderate assist  -LF     Position, Sitting Balance supported;sitting edge of bed  -               User Key  (r) = Recorded By, (t) = Taken By, (c) = Cosigned By      Initials Name Provider Type     Erin Flanagan OT Occupational Therapist                   Goals/Plan       Row Name 05/07/24 1457           Bed Mobility Goal 1 (OT)    Activity/Assistive Device (Bed Mobility Goal 1, OT) bed mobility activities, all  -LF     [place] Level/Cues Needed (Bed Mobility Goal 1, OT) supervision required  -LF     Time Frame (Bed Mobility Goal 1, OT) long term goal (LTG);10 days  -LF       Row Name 05/07/24 1457          Transfer Goal 1 (OT)    Activity/Assistive Device (Transfer Goal 1, OT) transfers, all  -LF     [place] Level/Cues Needed (Transfer Goal 1, OT) supervision required  -LF     Time Frame (Transfer Goal 1, OT) long term goal (LTG);10 days  -LF       Row Name 05/07/24 1457          Bathing Goal 1 (OT)    Activity/Device (Bathing Goal 1, OT) bathing skills, all  -LF     [place] Level/Cues Needed (Bathing Goal 1, OT) supervision required  -LF     Time Frame (Bathing Goal 1, OT) long term goal (LTG);10 days  -       Row Name 05/07/24 1457          Dressing Goal 1 (OT)    Activity/Device (Dressing Goal 1, OT) dressing skills, all  -LF     [place]/Cues Needed (Dressing Goal 1, OT) supervision required  -LF     Time Frame (Dressing Goal 1, OT) long term goal (LTG);10 days  -       Row Name 05/07/24 1457          Toileting Goal 1 (OT)    Activity/Device (Toileting Goal 1, OT) toileting skills, all  -LF     [place] Level/Cues Needed (Toileting Goal 1, OT) supervision required  -LF     Time Frame (Toileting Goal 1, OT) long term goal (LTG);10 days  -LF       Row Name 05/07/24 1457          Grooming Goal 1 (OT)    Activity/Device (Grooming Goal 1, OT) grooming skills, all  -LF     [place] (Grooming Goal 1, OT) supervision required  -LF     Time Frame (Grooming Goal 1, OT) long term goal (LTG);10 days  -LF       Row Name 05/07/24 1457          Problem Specific Goal 1 (OT)    Problem Specific Goal 1 (OT) Patient will demonstrate fair+ endurance to support ADLs/functional transfers.  -LF     Time Frame (Problem Specific Goal 1, OT) long term goal (LTG);10 days  -       Row Name 05/07/24 1457           Therapy Assessment/Plan (OT)    Planned Therapy Interventions (OT) activity tolerance training;BADL retraining;functional balance retraining;occupation/activity based interventions;patient/caregiver education/training;strengthening exercise;transfer/mobility retraining  -               User Key  (r) = Recorded By, (t) = Taken By, (c) = Cosigned By      Initials Name Provider Type     Erin Flanagan, OT Occupational Therapist                   Clinical Impression       Row Name 05/07/24 1456          Pain Assessment    Additional Documentation Pain Scale: FACES Pre/Post-Treatment (Group)  -[facility] Name 05/07/24 1456          Pain Scale: FACES Pre/Post-Treatment    Pain: FACES Scale, Pretreatment 2-->hurts little bit  -LF     Posttreatment Pain Rating 2-->hurts little bit  -LF     Pain Location generalized  -       Row Name 05/07/24 1456          Plan of Care Review    Plan of Care Reviewed With patient  -     Progress no change  -     Outcome Evaluation Patient presents with limitations in self-care, functional transfers, balance, and endurance. She would benefit from continued skilled occupational therapy services to maximize independence with ADLs/functional transfers.  -       Row Name 05/07/24 1456          Therapy Assessment/Plan (OT)    Patient/Family Therapy Goal Statement (OT) To maximize independence.  -     Rehab Potential (OT) good, to achieve stated therapy goals  -     Criteria for Skilled Therapeutic Interventions Met (OT) yes;meets criteria;skilled treatment is necessary  -     Therapy Frequency (OT) 5 times/wk  -       Row Name 05/07/24 1456          Therapy Plan Review/Discharge Plan (OT)    Anticipated Discharge Disposition (OT) sub acute care setting  -       Row Name 05/07/24 1456          Vital Signs    O2 Delivery Pre Treatment room air  -     O2 Delivery Intra Treatment room air  -LF     O2 Delivery Post Treatment room air  -       Row Name 05/07/24  1456          Positioning and Restraints    Pre-Treatment Position in bed  -LF     Post Treatment Position bed  -LF     In Bed fowlers;call light within reach;encouraged to call for assist;exit alarm on  -LF               User Key  (r) = Recorded By, (t) = Taken By, (c) = Cosigned By      Initials Name Provider Type    LF Erin Flanagan OT Occupational Therapist                   Outcome Measures       Row Name 05/07/24 1457          How much help from another is currently needed...    Putting on and taking off regular lower body clothing? 1  -LF     Bathing (including washing, rinsing, and drying) 2  -LF     Toileting (which includes using toilet bed pan or urinal) 1  -LF     Putting on and taking off regular upper body clothing 2  -LF     Taking care of personal grooming (such as brushing teeth) 2  -LF     Eating meals 2  -LF     AM-PAC 6 Clicks Score (OT) 10  -LF       Row Name 05/07/24 1200 05/07/24 0900       How much help from another person do you currently need...    Turning from your back to your side while in flat bed without using bedrails? 3  -BB 3  -DP    Moving from lying on back to sitting on the side of a flat bed without bedrails? 2  -BB 2  -DP    Moving to and from a bed to a chair (including a wheelchair)? 1  -BB 3  -DP    Standing up from a chair using your arms (e.g., wheelchair, bedside chair)? 1  -BB 3  -DP    Climbing 3-5 steps with a railing? 1  -BB 1  -DP    To walk in hospital room? 1  -BB 2  -DP    AM-PAC 6 Clicks Score (PT) 9  -BB 14  -DP    Highest Level of Mobility Goal 3 --> Sit at edge of bed  -BB 4 --> Transfer to chair/commode  -DP      Row Name 05/07/24 0755          How much help from another person do you currently need...    Turning from your back to your side while in flat bed without using bedrails? 3  -DM     Moving from lying on back to sitting on the side of a flat bed without bedrails? 2  -DM     Moving to and from a bed to a chair (including a wheelchair)? 3  -DM      Standing up from a chair using your arms (e.g., wheelchair, bedside chair)? 3  -DM     Climbing 3-5 steps with a railing? 1  -DM     To walk in hospital room? 2  -DM     AM-PAC 6 Clicks Score (PT) 14  -DM     Highest Level of Mobility Goal 4 --> Transfer to chair/commode  -DM       Row Name 05/07/24 1457 05/07/24 0900       Functional Assessment    Outcome Measure Options AM-PAC 6 Clicks Daily Activity (OT);Optimal Instrument  -LF AM-PAC 6 Clicks Basic Mobility (PT)  -DP      Row Name 05/07/24 1457          Optimal Instrument    Optimal Instrument Optimal - 3  -LF     Bending/Stooping 5  -LF     Standing 5  -LF     Reaching 1  -LF     From the list, choose the 3 activities you would most like to be able to do without any difficulty Bending/stooping;Standing;Reaching  -LF     Total Score Optimal - 3 11  -LF               User Key  (r) = Recorded By, (t) = Taken By, (c) = Cosigned By      Initials Name Provider Type    BB Jo Ann Blank, RN Registered Nurse    LF Erin Flanagan OT Occupational Therapist    DP Handy Gabriel, PT Physical Therapist    DM Jovani Slater, RN Registered Nurse                    Occupational Therapy Education       Title: PT OT SLP Therapies (In Progress)       Topic: Occupational Therapy (In Progress)       Point: ADL training (In Progress)       Description:   Instruct learner(s) on proper safety adaptation and remediation techniques during self care or transfers.   Instruct in proper use of assistive devices.                  Learning Progress Summary             Patient Acceptance, E,TB, NL by  at 5/7/2024 1458                         Point: Precautions (In Progress)       Description:   Instruct learner(s) on prescribed precautions during self-care and functional transfers.                  Learning Progress Summary             Patient Acceptance, E,TB, NL by  at 5/7/2024 1458                         Point: Body mechanics (In Progress)       Description:   Instruct learner(s)  on proper positioning and spine alignment during self-care, functional mobility activities and/or exercises.                  Learning Progress Summary             Patient Acceptance, E,TB, NL by  at 5/7/2024 1458                                         User Key       Initials Effective Dates Name Provider Type Discipline     06/16/21 -  Erin Flanagan OT Occupational Therapist OT                  OT Recommendation and Plan  Planned Therapy Interventions (OT): activity tolerance training, BADL retraining, functional balance retraining, occupation/activity based interventions, patient/caregiver education/training, strengthening exercise, transfer/mobility retraining  Therapy Frequency (OT): 5 times/wk  Plan of Care Review  Plan of Care Reviewed With: patient  Progress: no change  Outcome Evaluation: Patient presents with limitations in self-care, functional transfers, balance, and endurance. She would benefit from continued skilled occupational therapy services to maximize independence with ADLs/functional transfers.     Time Calculation:   Evaluation Complexity (OT)  Review Occupational Profile/Medical/Therapy History Complexity: brief/low complexity  Assessment, Occupational Performance/Identification of Deficit Complexity: 3-5 performance deficits  Clinical Decision Making Complexity (OT): problem focused assessment/low complexity  Overall Complexity of Evaluation (OT): low complexity     Time Calculation- OT       Row Name 05/07/24 1458             Time Calculation- OT    OT Received On 05/07/24  -LF      OT Goal Re-Cert Due Date 05/16/24  -LF         Untimed Charges    OT Eval/Re-eval Minutes 33  -LF         Total Minutes    Untimed Charges Total Minutes 33  -LF       Total Minutes 33  -LF                User Key  (r) = Recorded By, (t) = Taken By, (c) = Cosigned By      Initials Name Provider Type     Erin Flanagan OT Occupational Therapist                  Therapy Charges for Today       Code  Description Service Date Service Provider Modifiers Qty    99490052690 HC OT EVAL LOW COMPLEXITY 3 5/7/2024 Erin Flanagan, KENNETH GO 1                 Erin Flanagan OT  5/7/2024

## 2024-05-08 ENCOUNTER — APPOINTMENT (OUTPATIENT)
Dept: CT IMAGING | Facility: HOSPITAL | Age: 79
DRG: 057 | End: 2024-05-08
Payer: MEDICARE

## 2024-05-08 LAB
ALBUMIN SERPL-MCNC: 3.9 G/DL (ref 3.5–5.2)
ALBUMIN/GLOB SERPL: 1.7 G/DL
ALP SERPL-CCNC: 79 U/L (ref 39–117)
ALT SERPL W P-5'-P-CCNC: 8 U/L (ref 1–33)
ANION GAP SERPL CALCULATED.3IONS-SCNC: 12.7 MMOL/L (ref 5–15)
AST SERPL-CCNC: 17 U/L (ref 1–32)
BASOPHILS # BLD AUTO: 0.08 10*3/MM3 (ref 0–0.2)
BASOPHILS NFR BLD AUTO: 0.5 % (ref 0–1.5)
BILIRUB SERPL-MCNC: 0.5 MG/DL (ref 0–1.2)
BUN SERPL-MCNC: 9 MG/DL (ref 8–23)
BUN/CREAT SERPL: 8.8 (ref 7–25)
CALCIUM SPEC-SCNC: 8.2 MG/DL (ref 8.6–10.5)
CHLORIDE SERPL-SCNC: 106 MMOL/L (ref 98–107)
CO2 SERPL-SCNC: 19.3 MMOL/L (ref 22–29)
CORTIS SERPL-MCNC: 20.78 MCG/DL
CREAT SERPL-MCNC: 1.02 MG/DL (ref 0.57–1)
DEPRECATED RDW RBC AUTO: 47.4 FL (ref 37–54)
EGFRCR SERPLBLD CKD-EPI 2021: 56.8 ML/MIN/1.73
EOSINOPHIL # BLD AUTO: 0.03 10*3/MM3 (ref 0–0.4)
EOSINOPHIL NFR BLD AUTO: 0.2 % (ref 0.3–6.2)
ERYTHROCYTE [DISTWIDTH] IN BLOOD BY AUTOMATED COUNT: 13.6 % (ref 12.3–15.4)
GLOBULIN UR ELPH-MCNC: 2.3 GM/DL
GLUCOSE SERPL-MCNC: 145 MG/DL (ref 65–99)
HCT VFR BLD AUTO: 40.9 % (ref 34–46.6)
HGB BLD-MCNC: 12.8 G/DL (ref 12–15.9)
IMM GRANULOCYTES # BLD AUTO: 0.06 10*3/MM3 (ref 0–0.05)
IMM GRANULOCYTES NFR BLD AUTO: 0.3 % (ref 0–0.5)
LYMPHOCYTES # BLD AUTO: 0.71 10*3/MM3 (ref 0.7–3.1)
LYMPHOCYTES NFR BLD AUTO: 4.1 % (ref 19.6–45.3)
MAGNESIUM SERPL-MCNC: 1.7 MG/DL (ref 1.6–2.4)
MCH RBC QN AUTO: 29.8 PG (ref 26.6–33)
MCHC RBC AUTO-ENTMCNC: 31.3 G/DL (ref 31.5–35.7)
MCV RBC AUTO: 95.1 FL (ref 79–97)
MONOCYTES # BLD AUTO: 1.22 10*3/MM3 (ref 0.1–0.9)
MONOCYTES NFR BLD AUTO: 7 % (ref 5–12)
NEUTROPHILS NFR BLD AUTO: 15.3 10*3/MM3 (ref 1.7–7)
NEUTROPHILS NFR BLD AUTO: 87.9 % (ref 42.7–76)
NRBC BLD AUTO-RTO: 0 /100 WBC (ref 0–0.2)
PLATELET # BLD AUTO: 288 10*3/MM3 (ref 140–450)
PMV BLD AUTO: 9.6 FL (ref 6–12)
POTASSIUM SERPL-SCNC: 4.1 MMOL/L (ref 3.5–5.2)
PROT SERPL-MCNC: 6.2 G/DL (ref 6–8.5)
RBC # BLD AUTO: 4.3 10*6/MM3 (ref 3.77–5.28)
SODIUM SERPL-SCNC: 138 MMOL/L (ref 136–145)
WBC NRBC COR # BLD AUTO: 17.4 10*3/MM3 (ref 3.4–10.8)

## 2024-05-08 PROCEDURE — 25510000001 IOPAMIDOL PER 1 ML: Performed by: INTERNAL MEDICINE

## 2024-05-08 PROCEDURE — 93010 ELECTROCARDIOGRAM REPORT: CPT | Performed by: INTERNAL MEDICINE

## 2024-05-08 PROCEDURE — 83735 ASSAY OF MAGNESIUM: CPT | Performed by: INTERNAL MEDICINE

## 2024-05-08 PROCEDURE — 71260 CT THORAX DX C+: CPT

## 2024-05-08 PROCEDURE — 25010000002 ENOXAPARIN PER 10 MG: Performed by: INTERNAL MEDICINE

## 2024-05-08 PROCEDURE — 85025 COMPLETE CBC W/AUTO DIFF WBC: CPT | Performed by: INTERNAL MEDICINE

## 2024-05-08 PROCEDURE — 88184 FLOWCYTOMETRY/ TC 1 MARKER: CPT

## 2024-05-08 PROCEDURE — 74177 CT ABD & PELVIS W/CONTRAST: CPT

## 2024-05-08 PROCEDURE — 82533 TOTAL CORTISOL: CPT | Performed by: INTERNAL MEDICINE

## 2024-05-08 PROCEDURE — 93005 ELECTROCARDIOGRAM TRACING: CPT | Performed by: INTERNAL MEDICINE

## 2024-05-08 PROCEDURE — 80053 COMPREHEN METABOLIC PANEL: CPT | Performed by: INTERNAL MEDICINE

## 2024-05-08 RX ORDER — ENOXAPARIN SODIUM 100 MG/ML
30 INJECTION SUBCUTANEOUS DAILY
Status: DISCONTINUED | OUTPATIENT
Start: 2024-05-09 | End: 2024-05-13 | Stop reason: HOSPADM

## 2024-05-08 RX ORDER — METOPROLOL SUCCINATE 25 MG/1
25 TABLET, EXTENDED RELEASE ORAL EVERY 12 HOURS SCHEDULED
Status: DISCONTINUED | OUTPATIENT
Start: 2024-05-08 | End: 2024-05-13 | Stop reason: HOSPADM

## 2024-05-08 RX ADMIN — METOPROLOL SUCCINATE 25 MG: 50 TABLET, EXTENDED RELEASE ORAL at 08:16

## 2024-05-08 RX ADMIN — ENOXAPARIN SODIUM 40 MG: 100 INJECTION SUBCUTANEOUS at 08:16

## 2024-05-08 RX ADMIN — Medication 10 ML: at 08:17

## 2024-05-08 RX ADMIN — FAMOTIDINE 40 MG: 20 TABLET ORAL at 08:16

## 2024-05-08 RX ADMIN — CARBIDOPA AND LEVODOPA 1 TABLET: 25; 100 TABLET ORAL at 20:47

## 2024-05-08 RX ADMIN — IOPAMIDOL 60 ML: 755 INJECTION, SOLUTION INTRAVENOUS at 16:10

## 2024-05-08 RX ADMIN — POTASSIUM CHLORIDE, DEXTROSE MONOHYDRATE AND SODIUM CHLORIDE 50 ML/HR: 150; 5; 900 INJECTION, SOLUTION INTRAVENOUS at 12:24

## 2024-05-08 RX ADMIN — METOPROLOL SUCCINATE 25 MG: 25 TABLET, EXTENDED RELEASE ORAL at 20:47

## 2024-05-08 RX ADMIN — POTASSIUM CHLORIDE, DEXTROSE MONOHYDRATE AND SODIUM CHLORIDE 100 ML/HR: 150; 5; 900 INJECTION, SOLUTION INTRAVENOUS at 00:44

## 2024-05-08 RX ADMIN — CARBIDOPA AND LEVODOPA 1 TABLET: 25; 100 TABLET ORAL at 08:16

## 2024-05-08 RX ADMIN — CARBIDOPA AND LEVODOPA 1 TABLET: 25; 100 TABLET ORAL at 16:12

## 2024-05-08 NOTE — PROGRESS NOTES
[facility]     Progress Note    Patient Name: Devi Bobo  : 1945  MRN: [id number]  Primary Care Physician:  Raad Del Toro MD  Date of admission: 2024      Subjective   Brief summary.  Patient admitted with multiple symptoms including recurrent fall inability to walk, palpitations, anxiety and chest pain      HPI:  Patient lethargic she received 1 dose of Xanax last night.  No other complaints no anxiety.  Not eating much.  Later on RN reported she had tachycardia rate into 170s.  Resolved after few minutes.    Review of Systems     Losing weight, fatigue, poor appetite, anxiety, palpitations        Objective     Vitals:   Temp:  [98.2 °F (36.8 °C)-99.5 °F (37.5 °C)] 98.2 °F (36.8 °C)  Heart Rate:  [] 100  Resp:  [16-20] 16  BP: (136-160)/(67-87) 148/85    Physical Exam :     Elderly female not in acute distress cachectic.  Heart regular.  Tachycardic.  Lungs clear.  Abdomen soft.  Extremities no edema      Result Review:  I have personally reviewed the results from the time of this admission to 2024 19:05 EDT and agree with these findings:  [x]  Laboratory  []  Microbiology  []  Radiology  []  EKG/Telemetry   []  Cardiology/Vascular   []  Pathology  []  Old records  []  Other:           Assessment / Plan       Active Hospital Problems:  Active Hospital Problems    Diagnosis     **Generalized weakness     Tachycardia     Parkinson disease     Failure to thrive in adult     Chest pain, atypical     Generalized anxiety disorder     Recurrent falls        Plan:   Tachyarrhythmias.  Will increase Toprol to 25 twice daily.  Patient having issues with weight loss and failure to thrive.  I am concerned about malignancy, will consult oncologist Dr. Huang for opinion, check labs in AM.  Continue PT OT efforts       DVT prophylaxis:  Medical and mechanical DVT prophylaxis orders are present.        CODE STATUS:   Code Status (Patient has no pulse and is not breathing): CPR (Attempt to  Resuscitate)  Medical Interventions (Patient has pulse or is breathing): Full Support            Electronically signed by Lamont Prince MD, 05/08/24, 7:06 PM EDT.

## 2024-05-08 NOTE — CONSULTS
[facility]   Consult Note    Patient Name: Devi Bobo  : 1945  MRN: [id number]  Primary Care Physician:  Raad Del Toro MD  Date of admission: 2024    Subjective   Subjective     Reason for Consult: Extreme weakness fatigue and loss of weight loss of appetite to the cachexia possible malignancy    HPI: Patient had a colonoscopy about 3 years ago which was unremarkable she has been complaining of feeling extremely weak and tired and was brought in with altered mental status has not been a smoker denies history of chronic abuse or any drug abuse she states that she has lost a considerable amount of weight she does have chronic pain    Devi Bobo is a 79 y.o. 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)    • Hypertension    • Lesion of sciatic nerve, bilateral lower limbs    • Osteoporosis    • Overactive bladder    • Piriformis syndrome    • Polyp of colon    • Postmenopausal atrophic vaginitis    • Vaginal atrophy        Past Surgical History:   Procedure Laterality Date   • COLONOSCOPY     • LEEP N/A 2022    Procedure: LOOP ELECTROCAUTERY EXCISION PROCEDURE;  Surgeon: El Álvarez MD;  Location: [facility] OR;  Service: Gynecology;  Laterality: N/A;   • TUBAL ABDOMINAL LIGATION         Family History: family history includes Colon cancer in her sister; Heart disease in her father, mother, and sister; Thyroid disease in her mother and sister. Otherwise  pertinent FHx was reviewed and not pertinent to current issue.    Social History:  reports that she has never smoked. She has never used smokeless tobacco. She reports that she does not currently use alcohol. She reports that she does not currently use drugs.    Home Medications:  Calcium Carbonate-Vitamin D, HYDROcodone-acetaminophen, denosumab, estradiol, gabapentin, lisinopril, and multivitamin with minerals      Allergies:  Allergies   Allergen Reactions   • Sulfa Antibiotics Anaphylaxis   • Nitrofurantoin Confusion       Objective   Objective     Vitals:   Temp:  [98.1 °F (36.7 °C)-99.5 °F (37.5 °C)] 99.5 °F (37.5 °C)  Heart Rate:  [] 86  Resp:  [16-20] 16  BP: (136-160)/(67-87) 140/73  Physical Exam    Constitutional: Awake, alert   Eyes: PERRLA, sclerae anicteric, no conjunctival injection   HENT: NCAT, mucous membranes moist   Neck: Supple, no thyromegaly, no lymphadenopathy, trachea midline   Respiratory: Clear to auscultation bilaterally, nonlabored respirations    Cardiovascular: RRR, no murmurs, rubs, or gallops, palpable pedal pulses bilaterally   Gastrointestinal: Positive bowel sounds, soft, nontender, nondistended   Musculoskeletal: No bilateral ankle edema, no clubbing or cyanosis to extremities   Psychiatric: Appropriate affect, cooperative   Neurologic: Oriented x 3, strength symmetric in all extremities, Cranial Nerves grossly intact to confrontation, speech clear   Skin: No rashes     Result Review    Result Review:  I have personally reviewed the results from the time of this admission to 5/8/2024 15:11 EDT and agree with these findings:  [x]  Laboratory  []  Microbiology  [x]  Radiology  []  EKG/Telemetry   []  Cardiology/Vascular   []  Pathology  []  Old records  []  Other:  Most notable findings include: Has been reviewed    Assessment & Plan   Assessment / Plan     Brief Patient Summary:  Devi Bobo is a 79 y.o. female who Patient return for possible malignancy    Active  Hospital Problems:  Active Hospital Problems    Diagnosis    • **Generalized weakness    • Tachycardia    • Parkinson disease    • Failure to thrive in adult    • Chest pain, atypical    • Generalized anxiety disorder    • Recurrent falls        Plan:   CT scan of the chest abdomen and pelvis and the lab work to get out why she has been losing weight and by she so weak        Electronically signed by Zuhair Huang MD, 05/08/24, 3:11 PM EDT.    Part of this note may be an electronic transcription/translation of spoken language to printed text using the Dragon Dictation System.

## 2024-05-08 NOTE — PLAN OF CARE
Goal Outcome Evaluation:  Plan of Care Reviewed With: patient   Pt alert to self only. EKG showed sinus tachycardia. Nonverbal indictors of pain absent throughout shift. Continuing with plan of care.

## 2024-05-09 LAB
ALBUMIN SERPL-MCNC: 3.9 G/DL (ref 3.5–5.2)
ALBUMIN/GLOB SERPL: 1.6 G/DL
ALP SERPL-CCNC: 97 U/L (ref 39–117)
ALT SERPL W P-5'-P-CCNC: 5 U/L (ref 1–33)
ANION GAP SERPL CALCULATED.3IONS-SCNC: 9.4 MMOL/L (ref 5–15)
AST SERPL-CCNC: 44 U/L (ref 1–32)
BASOPHILS # BLD AUTO: 0.05 10*3/MM3 (ref 0–0.2)
BASOPHILS NFR BLD AUTO: 0.4 % (ref 0–1.5)
BILIRUB SERPL-MCNC: 0.6 MG/DL (ref 0–1.2)
BUN SERPL-MCNC: 10 MG/DL (ref 8–23)
BUN/CREAT SERPL: 11.4 (ref 7–25)
CALCIUM SPEC-SCNC: 8.5 MG/DL (ref 8.6–10.5)
CHLORIDE SERPL-SCNC: 106 MMOL/L (ref 98–107)
CO2 SERPL-SCNC: 22.6 MMOL/L (ref 22–29)
CREAT SERPL-MCNC: 0.88 MG/DL (ref 0.57–1)
DEPRECATED RDW RBC AUTO: 46.1 FL (ref 37–54)
EGFRCR SERPLBLD CKD-EPI 2021: 67.8 ML/MIN/1.73
EOSINOPHIL # BLD AUTO: 0.08 10*3/MM3 (ref 0–0.4)
EOSINOPHIL NFR BLD AUTO: 0.6 % (ref 0.3–6.2)
ERYTHROCYTE [DISTWIDTH] IN BLOOD BY AUTOMATED COUNT: 13.6 % (ref 12.3–15.4)
FERRITIN SERPL-MCNC: 127.1 NG/ML (ref 13–150)
FOLATE SERPL-MCNC: 19.6 NG/ML (ref 4.78–24.2)
GLOBULIN UR ELPH-MCNC: 2.5 GM/DL
GLUCOSE SERPL-MCNC: 133 MG/DL (ref 65–99)
HAPTOGLOB SERPL-MCNC: 216 MG/DL (ref 30–200)
HCT VFR BLD AUTO: 38 % (ref 34–46.6)
HCYS SERPL-MCNC: 15.1 UMOL/L (ref 0–15)
HGB BLD-MCNC: 12.3 G/DL (ref 12–15.9)
IMM GRANULOCYTES # BLD AUTO: 0.04 10*3/MM3 (ref 0–0.05)
IMM GRANULOCYTES NFR BLD AUTO: 0.3 % (ref 0–0.5)
IRON 24H UR-MRATE: 17 MCG/DL (ref 37–145)
IRON SATN MFR SERPL: 6 % (ref 20–50)
LYMPHOCYTES # BLD AUTO: 0.7 10*3/MM3 (ref 0.7–3.1)
LYMPHOCYTES NFR BLD AUTO: 5.5 % (ref 19.6–45.3)
MAGNESIUM SERPL-MCNC: 2.1 MG/DL (ref 1.6–2.4)
MCH RBC QN AUTO: 29.7 PG (ref 26.6–33)
MCHC RBC AUTO-ENTMCNC: 32.4 G/DL (ref 31.5–35.7)
MCV RBC AUTO: 91.8 FL (ref 79–97)
MONOCYTES # BLD AUTO: 1.22 10*3/MM3 (ref 0.1–0.9)
MONOCYTES NFR BLD AUTO: 9.5 % (ref 5–12)
NEUTROPHILS NFR BLD AUTO: 10.71 10*3/MM3 (ref 1.7–7)
NEUTROPHILS NFR BLD AUTO: 83.7 % (ref 42.7–76)
NRBC BLD AUTO-RTO: 0 /100 WBC (ref 0–0.2)
PLATELET # BLD AUTO: 259 10*3/MM3 (ref 140–450)
PMV BLD AUTO: 9.8 FL (ref 6–12)
POTASSIUM SERPL-SCNC: 4.3 MMOL/L (ref 3.5–5.2)
PROT SERPL-MCNC: 6.4 G/DL (ref 6–8.5)
QT INTERVAL: 336 MS
QTC INTERVAL: 456 MS
RBC # BLD AUTO: 4.14 10*6/MM3 (ref 3.77–5.28)
RETICS # AUTO: 0.09 10*6/MM3 (ref 0.02–0.13)
RETICS/RBC NFR AUTO: 2.06 % (ref 0.7–1.9)
SODIUM SERPL-SCNC: 138 MMOL/L (ref 136–145)
TIBC SERPL-MCNC: 298 MCG/DL (ref 298–536)
TRANSFERRIN SERPL-MCNC: 200 MG/DL (ref 200–360)
VIT B12 BLD-MCNC: 273 PG/ML (ref 211–946)
WBC NRBC COR # BLD AUTO: 12.8 10*3/MM3 (ref 3.4–10.8)

## 2024-05-09 PROCEDURE — 25010000002 ENOXAPARIN PER 10 MG: Performed by: INTERNAL MEDICINE

## 2024-05-09 PROCEDURE — 83521 IG LIGHT CHAINS FREE EACH: CPT | Performed by: INTERNAL MEDICINE

## 2024-05-09 PROCEDURE — 85025 COMPLETE CBC W/AUTO DIFF WBC: CPT | Performed by: INTERNAL MEDICINE

## 2024-05-09 PROCEDURE — 82784 ASSAY IGA/IGD/IGG/IGM EACH: CPT | Performed by: INTERNAL MEDICINE

## 2024-05-09 PROCEDURE — 85045 AUTOMATED RETICULOCYTE COUNT: CPT | Performed by: INTERNAL MEDICINE

## 2024-05-09 PROCEDURE — 82607 VITAMIN B-12: CPT | Performed by: INTERNAL MEDICINE

## 2024-05-09 PROCEDURE — 83090 ASSAY OF HOMOCYSTEINE: CPT | Performed by: INTERNAL MEDICINE

## 2024-05-09 PROCEDURE — 82746 ASSAY OF FOLIC ACID SERUM: CPT | Performed by: INTERNAL MEDICINE

## 2024-05-09 PROCEDURE — 82728 ASSAY OF FERRITIN: CPT | Performed by: INTERNAL MEDICINE

## 2024-05-09 PROCEDURE — 80053 COMPREHEN METABOLIC PANEL: CPT | Performed by: INTERNAL MEDICINE

## 2024-05-09 PROCEDURE — 83540 ASSAY OF IRON: CPT | Performed by: INTERNAL MEDICINE

## 2024-05-09 PROCEDURE — 86334 IMMUNOFIX E-PHORESIS SERUM: CPT | Performed by: INTERNAL MEDICINE

## 2024-05-09 PROCEDURE — 83735 ASSAY OF MAGNESIUM: CPT | Performed by: INTERNAL MEDICINE

## 2024-05-09 PROCEDURE — 84466 ASSAY OF TRANSFERRIN: CPT | Performed by: INTERNAL MEDICINE

## 2024-05-09 PROCEDURE — 83010 ASSAY OF HAPTOGLOBIN QUANT: CPT | Performed by: INTERNAL MEDICINE

## 2024-05-09 PROCEDURE — 97110 THERAPEUTIC EXERCISES: CPT

## 2024-05-09 RX ADMIN — METOPROLOL SUCCINATE 25 MG: 25 TABLET, EXTENDED RELEASE ORAL at 08:33

## 2024-05-09 RX ADMIN — CARBIDOPA AND LEVODOPA 1 TABLET: 25; 100 TABLET ORAL at 15:52

## 2024-05-09 RX ADMIN — CARBIDOPA AND LEVODOPA 1 TABLET: 25; 100 TABLET ORAL at 21:54

## 2024-05-09 RX ADMIN — METOPROLOL SUCCINATE 25 MG: 25 TABLET, EXTENDED RELEASE ORAL at 20:23

## 2024-05-09 RX ADMIN — FAMOTIDINE 40 MG: 20 TABLET ORAL at 08:34

## 2024-05-09 RX ADMIN — CARBIDOPA AND LEVODOPA 1 TABLET: 25; 100 TABLET ORAL at 08:34

## 2024-05-09 RX ADMIN — ENOXAPARIN SODIUM 30 MG: 100 INJECTION SUBCUTANEOUS at 08:33

## 2024-05-09 NOTE — PROGRESS NOTES
[facility]     Progress Note             Patient Name: Devi Bobo  : 1945  MRN: [id number]  Primary Care Physician:  Raad Del Toro MD  Date of admission: 2024        Present illness:  Her brother is at the bedside.  She is doing better, she is awake and more alert.  She is well-groomed today.  She comb her hair.  She feels that she is better.  Tremors are less.  Dr. Miller Moran is no cogwheel rigidity.    Her responses were coherent and relevant.  She was aware of what was going on around her.  She had an insight to her condition.  She was able to understand and follow verbal command.    She has coarse tremors of his of her hands and fingers.  There is minimal cogwheel rigidity on passive manipulation of the elbow regions.    Review of Systems  No headache, dizziness, nausea or vomiting.      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)     Hypertension     Lesion of sciatic nerve, bilateral lower limbs     Osteoporosis     Overactive bladder     Piriformis syndrome     Polyp of colon     Postmenopausal atrophic vaginitis     Vaginal atrophy        Past Surgical History:   Procedure Laterality Date    COLONOSCOPY  2019    LEEP N/A 2022    Procedure: LOOP ELECTROCAUTERY EXCISION PROCEDURE;  Surgeon: El Álvarez MD;  Location: [facility] OR;  Service: Gynecology;  Laterality: N/A;    TUBAL ABDOMINAL LIGATION         Family History: family history includes Colon cancer in her sister; Heart disease in her father, mother, and sister; Thyroid disease in her mother and sister. Otherwise pertinent FHx was reviewed and not pertinent to current issue.    Social History:  reports that she  has never smoked. She has never used smokeless tobacco. She reports that she does not currently use alcohol. She reports that she does not currently use drugs.      Home Medications:  Calcium Carbonate-Vitamin D, HYDROcodone-acetaminophen, denosumab, estradiol, gabapentin, lisinopril, and multivitamin with minerals      Allergies:  Allergies   Allergen Reactions    Sulfa Antibiotics Anaphylaxis    Nitrofurantoin Confusion       Vitals:   Temp:  [98.2 °F (36.8 °C)-99.5 °F (37.5 °C)] 98.2 °F (36.8 °C)  Heart Rate:  [] 100  Resp:  [16-20] 16  BP: (136-160)/(67-87) 148/85      Result Review:  I have personally reviewed the results from the time of this admission to 5/8/2024 21:10 EDT and agree with these findings:  []  Laboratory  []  Microbiology  []  Radiology  []  EKG/Telemetry   []  Cardiology/Vascular   []  Pathology  []  Old records  []  Other:  Most notable findings include:   May 6, 2024  I reviewed the digital images of the MRI of her brain that was done on May 6, 2024.  This study showed no acute changes.  There is mild subcortical and periventricular white matter changes consistent with old microvascular ischemia.  There is mild diffuse symmetric cortical atrophy.     Impression:    Generalized Weakness  Parkinson Disease  Malnutrition     Plan:   Continue Sinemet, physical and Occupational Therapy.         Electronically signed by Hayder Doe Jr., MD, 05/08/24, 9:10 PM EDT.        Please note that portions of this note were completed with a voice recognition program.  Part of this note is an electric or electronic transcription/translation of spoken language to printed text using the dragon dictating system.

## 2024-05-09 NOTE — THERAPY TREATMENT NOTE
Patient Name: Devi Bobo  : 1945    MRN: [id number]                              Today's Date: 2024       Admit Date: 2024    Visit Dx:     ICD-10-CM ICD-9-CM   1. Acute UTI (urinary tract infection)  N39.0 599.0   2. Generalized weakness  R53.1 780.79   3. Chest pain, unspecified type  R07.9 786.50   4. Unsteady gait  R26.81 781.2   5. Difficulty walking  R26.2 719.7   6. Decreased activities of daily living (ADL)  Z78.9 V49.89     Patient Active Problem List   Diagnosis    Bronchitis    Cataract    Chronic pelvic pain in female    Degeneration of lumbar intervertebral disc    Encounter for long-term (current) use of insulin    Frequency of micturition    Herniation of intervertebral disc    High blood pressure    HTN (hypertension)    Lumbar spondylosis    Nocturia    Osteoporosis    Pain, generalized    Urge incontinence    Bladder disorder    Female bladder prolapse    Urinary incontinence    Urinary urgency    Piriformis syndrome of both sides    Vaginal atrophy    Atypical squamous cells cannot exclude high grade squamous intraepithelial lesion on cytologic smear of cervix (ASC-H)    High grade squamous intraepithelial lesion (HGSIL) on cytologic smear of cervix    Sciatic nerve lesion    Osteoarthritis    Lumbosacral spondylosis without myelopathy    Chronic vertigo    Chronic fatigue syndrome    Cervical spondylosis without myelopathy    Abnormal gait    Dysplasia of cervix, low grade (CASH 1)    Postoperative visit    Postmenopausal osteoporosis    Sepsis, due to unspecified organism, unspecified whether acute organ dysfunction present    Severe malnutrition    Weakness    COVID-19    Pharyngitis    Severe malnutrition    Cytokine release syndrome, grade 1    Generalized weakness    Chest pain, atypical    Generalized anxiety disorder    Recurrent falls    Tachycardia    Parkinson disease    Failure to thrive in adult     Past Medical History:   Diagnosis Date    Age-related  osteoporosis without current pathological fracture     Allergy     SULFA DRUGS   MILD    Atypical squamous cells cannot exclude high grade squamous intraepithelial lesion on cytologic smear of cervix (ASC-H)     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)     Hypertension     Lesion of sciatic nerve, bilateral lower limbs     Osteoporosis     Overactive bladder     Piriformis syndrome     Polyp of colon     Postmenopausal atrophic vaginitis     Vaginal atrophy      Past Surgical History:   Procedure Laterality Date    COLONOSCOPY  2019    LEEP N/A 1/6/2022    Procedure: LOOP ELECTROCAUTERY EXCISION PROCEDURE;  Surgeon: El Álvarez MD;  Location: [facility] MAIN OR;  Service: Gynecology;  Laterality: N/A;    TUBAL ABDOMINAL LIGATION        General Information       Row Name 05/09/24 1043          OT Time and Intention    Document Type therapy note (daily note)  -AV     Mode of Treatment individual therapy;occupational therapy  -AV       Row Name 05/09/24 1043          General Information    Existing Precautions/Restrictions fall  -AV       Row Name 05/09/24 1043          Cognition    Orientation Status (Cognition) --  Alert.  Agreeable to therapeutic exercise.  Fair active participation  -AV       Row Name 05/09/24 1043          Safety Issues, Functional Mobility    Impairments Affecting Function (Mobility) balance;cognition;endurance/activity tolerance  -AV               User Key  (r) = Recorded By, (t) = Taken By, (c) = Cosigned By      Initials Name Provider Type    AV Howie Moctezuma OT Occupational Therapist                     Mobility/ADL's    No documentation.                  Obj/Interventions       Row Name 05/09/24 1044          Shoulder (Therapeutic Exercise)    Shoulder (Therapeutic Exercise) AAROM (active assistive range of motion)  -AV     Shoulder AAROM (Therapeutic Exercise) bilateral;flexion;aBduction;aDduction;10 repetitions  -AV       Row Name 05/09/24  1044          Elbow/Forearm (Therapeutic Exercise)    Elbow/Forearm (Therapeutic Exercise) AAROM (active assistive range of motion)  -AV     Elbow/Forearm AAROM (Therapeutic Exercise) bilateral;flexion;extension;supination;pronation;10 repetitions  -AV       Row Name 05/09/24 1044          Motor Skills    Therapeutic Exercise shoulder;elbow/forearm  AAROM exercises performed in high Fowlers.  Resistive at times.  Fair active participation.  -AV               User Key  (r) = Recorded By, (t) = Taken By, (c) = Cosigned By      Initials Name Provider Type    Howie Villegas OT Occupational Therapist                   Goals/Plan    No documentation.                  Clinical Impression       [facility] Name 05/09/24 1044          Pain Scale: FACES Pre/Post-Treatment    Pain: FACES Scale, Pretreatment 0-->no hurt  -AV     Posttreatment Pain Rating 0-->no hurt  -AV       [facility] Name 05/09/24 1044          Plan of Care Review    Progress no change  -AV     Outcome Evaluation Patient performed upper extremity AAROM exercises.  Continued OT indicated to remediate/compensate for deficits to maximize independence.  -AV       Row Name 05/09/24 1044          Vital Signs    O2 Delivery Pre Treatment room air  -AV     O2 Delivery Intra Treatment room air  -AV     O2 Delivery Post Treatment room air  -AV       [facility] Name 05/09/24 1044          Positioning and Restraints    Pre-Treatment Position in bed  -AV     Post Treatment Position bed  -AV     In Bed call light within reach;encouraged to call for assist;exit alarm on  -AV               User Key  (r) = Recorded By, (t) = Taken By, (c) = Cosigned By      Initials Name Provider Type    Howie Villegas OT Occupational Therapist                   Outcome Measures       Row Name 05/09/24 1045          How much help from another is currently needed...    Putting on and taking off regular lower body clothing? 1  -AV     Bathing (including washing, rinsing, and drying) 2  -AV     Toileting  (which includes using toilet bed pan or urinal) 1  -AV     Putting on and taking off regular upper body clothing 2  -AV     Taking care of personal grooming (such as brushing teeth) 2  -AV     Eating meals 2  -AV     AM-PAC 6 Clicks Score (OT) 10  -AV       Row Name 05/09/24 0741          How much help from another person do you currently need...    Turning from your back to your side while in flat bed without using bedrails? 3  -ST     Moving from lying on back to sitting on the side of a flat bed without bedrails? 2  -ST     Moving to and from a bed to a chair (including a wheelchair)? 2  -ST     Standing up from a chair using your arms (e.g., wheelchair, bedside chair)? 1  -ST     Climbing 3-5 steps with a railing? 1  -ST     To walk in hospital room? 1  -ST     AM-PAC 6 Clicks Score (PT) 10  -ST     Highest Level of Mobility Goal 4 --> Transfer to chair/commode  -ST       Row Name 05/09/24 1045          Optimal Instrument    Bending/Stooping 5  -AV     Standing 5  -AV     Reaching 1  -AV               User Key  (r) = Recorded By, (t) = Taken By, (c) = Cosigned By      Initials Name Provider Type    ST Cailin Villalpando, RN Registered Nurse    Howie Villegas OT Occupational Therapist                    Occupational Therapy Education       Title: PT OT SLP Therapies (In Progress)       Topic: Occupational Therapy (In Progress)       Point: ADL training (In Progress)       Description:   Instruct learner(s) on proper safety adaptation and remediation techniques during self care or transfers.   Instruct in proper use of assistive devices.                  Learning Progress Summary             Patient Acceptance, E,TB, NL by  at 5/7/2024 6823                         Point: Precautions (In Progress)       Description:   Instruct learner(s) on prescribed precautions during self-care and functional transfers.                  Learning Progress Summary             Patient Acceptance, E,TB, NL by  at 5/7/2024 2947                          Point: Body mechanics (In Progress)       Description:   Instruct learner(s) on proper positioning and spine alignment during self-care, functional mobility activities and/or exercises.                  Learning Progress Summary             Patient Acceptance, E,TB, NL by  at 5/7/2024 1458                                         User Key       Initials Effective Dates Name Provider Type Discipline     06/16/21 -  Erin Flanagan OT Occupational Therapist OT                  OT Recommendation and Plan     Plan of Care Review  Progress: no change  Outcome Evaluation: Patient performed upper extremity AAROM exercises.  Continued OT indicated to remediate/compensate for deficits to maximize independence.     Time Calculation:         Time Calculation- OT       Row Name 05/09/24 1046             Time Calculation- OT    OT Received On 05/09/24  -AV      OT Goal Re-Cert Due Date 05/16/24  -AV         Timed Charges    81014 - OT Therapeutic Exercise Minutes 10  -AV         Total Minutes    Timed Charges Total Minutes 10  -AV       Total Minutes 10  -AV                User Key  (r) = Recorded By, (t) = Taken By, (c) = Cosigned By      Initials Name Provider Type    AV Howie Moctezuma OT Occupational Therapist                  Therapy Charges for Today       Code Description Service Date Service Provider Modifiers Qty    [id number]  OT THER PROC EA 15 MIN 5/9/2024 Howie Moctezuma OT GO 1                 Howie Moctezuma OT  5/9/2024

## 2024-05-09 NOTE — PROGRESS NOTES
[facility]     Progress Note    Patient Name: Devi Bobo  : 1945  MRN: [id number]  Primary Care Physician:  Raad Del Toro MD  Date of admission: 2024      Subjective   Brief summary.  Patient admitted with multiple symptoms including recurrent fall inability to walk, palpitations, anxiety and chest pain      HPI:  Patient lethargic confused and restless.  Patient's son does not want her to get Parkinson's medicines, she has progressed in the last 4 weeks getting worse unable to get up unable to move.    Review of Systems     Poor appetite.  Not eating much.  No chest pain, no shortness of breath.  Restless and confused      Objective     Vitals:   Temp:  [98.2 °F (36.8 °C)-99.5 °F (37.5 °C)] 98.6 °F (37 °C)  Heart Rate:  [] 95  Resp:  [16-18] 18  BP: (140-159)/(69-85) 148/82    Physical Exam :     Elderly female not in acute distress cachectic.  Heart regular.  Tachycardic.  Lungs clear.  Abdomen soft.  Neurologically awake alert but restless.  Extremities no edema      Result Review:  I have personally reviewed the results from the time of this admission to 2024 07:23 EDT and agree with these findings:  [x]  Laboratory  []  Microbiology  []  Radiology  []  EKG/Telemetry   []  Cardiology/Vascular   []  Pathology  []  Old records  []  Other:    CT findings reviewed      Assessment / Plan       Active Hospital Problems:  Active Hospital Problems    Diagnosis     **Generalized weakness     Tachycardia     Parkinson disease     Failure to thrive in adult     Chest pain, atypical     Generalized anxiety disorder     Recurrent falls        Plan:   Heart rate and blood pressure stable.  Mental status not clearing.  Neurologist on board, patient's son reluctant about meds.  Will discontinue Parkinson's medicines and see how she responds.  Discussed with patient's son  Patient will need inpatient rehab.,  PT OT,  consulted for discharge planning       DVT  prophylaxis:  Medical and mechanical DVT prophylaxis orders are present.        CODE STATUS:   Code Status (Patient has no pulse and is not breathing): CPR (Attempt to Resuscitate)  Medical Interventions (Patient has pulse or is breathing): Full Support              Electronically signed by Lamont Prince MD, 05/09/24, 7:26 AM EDT.

## 2024-05-09 NOTE — PROGRESS NOTES
[facility]     Progress Note    Patient Name: Devi Bobo  : 1945  MRN: [id number]  Primary Care Physician:  Raad Del Toro MD  Date of admission: 2024    Subjective   Subjective     Chief Complaint: General condition remains the same and hardly responsive    HPI: Logical status remains the same there is no evidence of any malignancy    Review of Systems   All systems were reviewed and negative except for: Has been reviewed    Objective   Objective     Vitals:   Temp:  [98.2 °F (36.8 °C)-99.5 °F (37.5 °C)] 98.6 °F (37 °C)  Heart Rate:  [] 95  Resp:  [16-18] 18  BP: (140-159)/(69-85) 148/82    Physical Exam    Constitutional: Awake, alert   Eyes: PERRLA, sclerae anicteric, no conjunctival injection   HENT: NCAT, mucous membranes moist   Neck: Supple, no thyromegaly, no lymphadenopathy, trachea midline   Respiratory: Clear to auscultation bilaterally, nonlabored respirations    Cardiovascular: RRR, no murmurs, rubs, or gallops, palpable pedal pulses bilaterally   Gastrointestinal: Positive bowel sounds, soft, nontender, nondistended   Musculoskeletal: No bilateral ankle edema, no clubbing or cyanosis to extremities   Psychiatric: Appropriate affect, cooperative   Neurologic: Oriented x 3, strength symmetric in all extremities, Cranial Nerves grossly intact to confrontation, speech clear   Skin: No rashes   But very slow  Result Review    Result Review:  I have personally reviewed the results from the time of this admission to 2024 07:56 EDT and agree with these findings:  [x]  Laboratory  []  Microbiology  [x]  Radiology  []  EKG/Telemetry   []  Cardiology/Vascular   []  Pathology  []  Old records  []  Other:  Most notable findings include: Have been reviewed and discussed    Assessment & Plan   Assessment / Plan     Brief Patient Summary:  Devi Bobo is a 79 y.o. female who been reviewed and discussed etiology of her condition not clear possible Parkinson's for which  treatment is being given    Active Hospital Problems:  Active Hospital Problems    Diagnosis     **Generalized weakness     Tachycardia     Parkinson disease     Failure to thrive in adult     Chest pain, atypical     Generalized anxiety disorder     Recurrent falls        Plan:   Continue as per neurology    DVT prophylaxis:  Medical and mechanical DVT prophylaxis orders are present.        CODE STATUS:   Code Status (Patient has no pulse and is not breathing): CPR (Attempt to Resuscitate)  Medical Interventions (Patient has pulse or is breathing): Full Support    Disposition:  I expect patient to be discharged after the patient has been stable.    Electronically signed by Zuhair Huang MD, 05/09/24, 7:56 AM EDT.      Part of this note may be an electronic transcription/translation of spoken language to printed text using the Dragon Dictation System.

## 2024-05-10 LAB
GLUCOSE BLDC GLUCOMTR-MCNC: 118 MG/DL (ref 70–99)
GLUCOSE BLDC GLUCOMTR-MCNC: 122 MG/DL (ref 70–99)
IGA SERPL-MCNC: 227 MG/DL (ref 64–422)
IGG SERPL-MCNC: 662 MG/DL (ref 586–1602)
IGM SERPL-MCNC: 172 MG/DL (ref 26–217)
KAPPA LC FREE SER-MCNC: 19.6 MG/L (ref 3.3–19.4)
KAPPA LC FREE/LAMBDA FREE SER: 1.09 {RATIO} (ref 0.26–1.65)
LAMBDA LC FREE SERPL-MCNC: 17.9 MG/L (ref 5.7–26.3)
Lab: NORMAL
PROT PATTERN SERPL IFE-IMP: NORMAL

## 2024-05-10 PROCEDURE — 82948 REAGENT STRIP/BLOOD GLUCOSE: CPT

## 2024-05-10 PROCEDURE — 97530 THERAPEUTIC ACTIVITIES: CPT

## 2024-05-10 PROCEDURE — 25010000002 ENOXAPARIN PER 10 MG: Performed by: INTERNAL MEDICINE

## 2024-05-10 RX ADMIN — CARBIDOPA AND LEVODOPA 1 TABLET: 25; 100 TABLET ORAL at 21:19

## 2024-05-10 RX ADMIN — Medication 10 ML: at 09:13

## 2024-05-10 RX ADMIN — ENOXAPARIN SODIUM 30 MG: 100 INJECTION SUBCUTANEOUS at 09:11

## 2024-05-10 RX ADMIN — METOPROLOL SUCCINATE 25 MG: 25 TABLET, EXTENDED RELEASE ORAL at 21:19

## 2024-05-10 RX ADMIN — CARBIDOPA AND LEVODOPA 1 TABLET: 25; 100 TABLET ORAL at 17:34

## 2024-05-10 RX ADMIN — METOPROLOL SUCCINATE 25 MG: 25 TABLET, EXTENDED RELEASE ORAL at 09:12

## 2024-05-10 RX ADMIN — POTASSIUM CHLORIDE, DEXTROSE MONOHYDRATE AND SODIUM CHLORIDE 50 ML/HR: 150; 5; 900 INJECTION, SOLUTION INTRAVENOUS at 01:13

## 2024-05-10 RX ADMIN — FAMOTIDINE 40 MG: 20 TABLET ORAL at 09:12

## 2024-05-10 RX ADMIN — Medication 10 ML: at 21:20

## 2024-05-10 RX ADMIN — CARBIDOPA AND LEVODOPA 1 TABLET: 25; 100 TABLET ORAL at 09:12

## 2024-05-10 RX ADMIN — POTASSIUM CHLORIDE, DEXTROSE MONOHYDRATE AND SODIUM CHLORIDE 50 ML/HR: 150; 5; 900 INJECTION, SOLUTION INTRAVENOUS at 21:15

## 2024-05-10 NOTE — PLAN OF CARE
Goal Outcome Evaluation:    ALERT TO SELF & PLACE. TOLERATING ROOM AIR. POOR APPETITE BUT TAKING BITES & SOME PO FLUIDS.

## 2024-05-10 NOTE — THERAPY TREATMENT NOTE
Acute Care - Physical Therapy Treatment Note  MAL Fernández     Patient Name: Devi Bobo  : 1945  MRN: [id number]  Today's Date: 5/10/2024      Visit Dx:     ICD-10-CM ICD-9-CM   1. Acute UTI (urinary tract infection)  N39.0 599.0   2. Generalized weakness  R53.1 780.79   3. Chest pain, unspecified type  R07.9 786.50   4. Unsteady gait  R26.81 781.2   5. Difficulty walking  R26.2 719.7   6. Decreased activities of daily living (ADL)  Z78.9 V49.89     Patient Active Problem List   Diagnosis    Bronchitis    Cataract    Chronic pelvic pain in female    Degeneration of lumbar intervertebral disc    Encounter for long-term (current) use of insulin    Frequency of micturition    Herniation of intervertebral disc    High blood pressure    HTN (hypertension)    Lumbar spondylosis    Nocturia    Osteoporosis    Pain, generalized    Urge incontinence    Bladder disorder    Female bladder prolapse    Urinary incontinence    Urinary urgency    Piriformis syndrome of both sides    Vaginal atrophy    Atypical squamous cells cannot exclude high grade squamous intraepithelial lesion on cytologic smear of cervix (ASC-H)    High grade squamous intraepithelial lesion (HGSIL) on cytologic smear of cervix    Sciatic nerve lesion    Osteoarthritis    Lumbosacral spondylosis without myelopathy    Chronic vertigo    Chronic fatigue syndrome    Cervical spondylosis without myelopathy    Abnormal gait    Dysplasia of cervix, low grade (CASH 1)    Postoperative visit    Postmenopausal osteoporosis    Sepsis, due to unspecified organism, unspecified whether acute organ dysfunction present    Severe malnutrition    Weakness    COVID-19    Pharyngitis    Severe malnutrition    Cytokine release syndrome, grade 1    Generalized weakness    Chest pain, atypical    Generalized anxiety disorder    Recurrent falls    Tachycardia    Parkinson disease    Failure to thrive in adult     Past Medical History:   Diagnosis Date    Age-related  osteoporosis without current pathological fracture     Allergy     SULFA DRUGS   MILD    Atypical squamous cells cannot exclude high grade squamous intraepithelial lesion on cytologic smear of cervix (ASC-H)     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)     Hypertension     Lesion of sciatic nerve, bilateral lower limbs     Osteoporosis     Overactive bladder     Piriformis syndrome     Polyp of colon     Postmenopausal atrophic vaginitis     Vaginal atrophy      Past Surgical History:   Procedure Laterality Date    COLONOSCOPY  2019    LEEP N/A 1/6/2022    Procedure: LOOP ELECTROCAUTERY EXCISION PROCEDURE;  Surgeon: El Álvarez MD;  Location: [facility] MAIN OR;  Service: Gynecology;  Laterality: N/A;    TUBAL ABDOMINAL LIGATION       PT Assessment (Last 12 Hours)       PT Evaluation and Treatment       Row Name 05/10/24 1415          Physical Therapy Time and Intention    Subjective Information no complaints (P)   -     Document Type therapy note (daily note) (P)   -     Mode of Treatment individual therapy;physical therapy (P)   -     Patient Effort fair (P)   -     Symptoms Noted During/After Treatment none (P)   -       Row Name 05/10/24 1415          General Information    Patient Profile Reviewed yes (P)   -     Patient Observations alert;cooperative;agree to therapy (P)   -     Existing Precautions/Restrictions fall (P)   -     Barriers to Rehab none identified (P)   -       Row Name 05/10/24 1415          Pain    Pretreatment Pain Rating 0/10 - no pain (P)   -     Posttreatment Pain Rating 0/10 - no pain (P)   -       Row Name 05/10/24 1415          Cognition    Affect/Mental Status (Cognition) confused (P)   -     Orientation Status (Cognition) oriented to;person;place (P)   -     Follows Commands (Cognition) follows one-step commands;increased processing time needed;delayed response/completion;repetition of directions required;verbal  cues/prompting required (P)   -       Row Name 05/10/24 1415          Bed Mobility    Bed Mobility supine-sit;sit-supine (P)   -     Supine-Sit [place] (Bed Mobility) 1 person assist;moderate assist (50% patient effort);maximum assist (25% patient effort);verbal cues;nonverbal cues (demo/gesture) (P)   -     Sit-Supine [place] (Bed Mobility) 1 person assist;moderate assist (50% patient effort);maximum assist (25% patient effort);verbal cues;nonverbal cues (demo/gesture) (P)   -     Bed Mobility, Safety Issues cognitive deficits limit understanding;decreased use of arms for pushing/pulling;decreased use of legs for bridging/pushing;impaired trunk control for bed mobility (P)   -     Assistive Device (Bed Mobility) bed rails;draw sheet;head of bed elevated (P)   -       Row Name 05/10/24 1415          Transfers    Comment, (Transfers) Attempted 5 STS with patient. Needed rest breaks between all attempts. First 2 attempts were unsuccessful with maxA as well as verbal and non-verbal cues. 3 STS were not fully completed but able to get off the bed enough for sheets to be changed by nsg. Very unsteady when trying to stand. Pulls on walker and therapist significantly. (P)   -       Row Name 05/10/24 1415          Gait/Stairs (Locomotion)    Patient was able to Ambulate no, other medical factors prevent ambulation (P)   -     Reason Patient was unable to Ambulate Excessive Weakness (P)   -     Distance in Feet (Gait) 0 (P)   -       Row Name 05/10/24 1415          Safety Issues, Functional Mobility    Impairments Affecting Function (Mobility) balance;cognition;endurance/activity tolerance;strength (P)   -     Cognitive Impairments, Mobility Safety/Performance attention;sequencing abilities (P)   -       Row Name 05/10/24 1415          Balance    Balance Assessment sitting dynamic balance (P)   -     Dynamic Sitting Balance 1-person assist;minimal assist (P)   -     Position, Sitting  Balance unsupported;sitting edge of bed (P)   -     Comment, Balance Sitting EOB she requires assistance to stay upright. Tends to lean posteriorly. (P)   -       Row Name 05/10/24 1415          Positioning and Restraints    Pre-Treatment Position in bed (P)   -     Post Treatment Position bed (P)   -     In Bed with family/caregiver;fowlers;sitting EOB;call light within reach;encouraged to call for assist;notified nsg (P)   -       Row Name 05/10/24 1419          Progress Summary (PT)    Progress Toward Functional Goals (PT) progress toward functional goals is gradual (P)   -     Daily Progress Summary (PT) Patient tolerated treatment fairly well today. She is more coherent and did not show any signs of combativeness. Bed mobility and STS were the primary focus of this session as PT changed bedding with nsg. Still requires lots of assistance for transfers. Plan to continue PT. (P)   -     Barriers to Overall Progress (PT) No barriers identified. (P)   -       Row Name 05/10/24 1415          Therapy Plan Review/Discharge Plan (PT)    Therapy Plan Review (PT) evaluation/treatment results reviewed (P)   -               User Key  (r) = Recorded By, (t) = Taken By, (c) = Cosigned By      Initials Name Provider Type    Calixto Gray, PT Student PT Student                      PT Recommendation and Plan  Anticipated Discharge Disposition (PT): (P) sub acute care setting  Progress Summary (PT)  Progress Toward Functional Goals (PT): (P) progress toward functional goals is gradual  Daily Progress Summary (PT): (P) Patient tolerated treatment fairly well today. She is more coherent and did not show any signs of combativeness. Bed mobility and STS were the primary focus of this session as PT changed bedding with nsg. Still requires lots of assistance for transfers. Plan to continue PT.  Barriers to Overall Progress (PT): (P) No barriers identified.   Outcome Measures       Row Name 05/10/24 6939              How much help from another person do you currently need...    Turning from your back to your side while in flat bed without using bedrails? 2 (P)   -MF      Moving from lying on back to sitting on the side of a flat bed without bedrails? 2 (P)   -MF      Moving to and from a bed to a chair (including a wheelchair)? 2 (P)   -MF      Standing up from a chair using your arms (e.g., wheelchair, bedside chair)? 1 (P)   -MF      Climbing 3-5 steps with a railing? 1 (P)   -MF      To walk in hospital room? 1 (P)   -MF      AM-PAC 6 Clicks Score (PT) 9 (P)   -MF      Highest Level of Mobility Goal 3 --> Sit at edge of bed (P)   -MF         Functional Assessment    Outcome Measure Options AM-PAC 6 Clicks Basic Mobility (PT) (P)   -MF                User Key  (r) = Recorded By, (t) = Taken By, (c) = Cosigned By      Initials Name Provider Type     Calixto Allen PT Student PT Student                     Time Calculation:    PT Charges       Row Name 05/10/24 1415             Time Calculation    PT Received On 05/10/24 (P)   -MF         Timed Charges    05910 - PT Therapeutic Activity Minutes 26 (P)   -MF         Total Minutes    Timed Charges Total Minutes 26 (P)   -MF       Total Minutes 26 (P)   -                User Key  (r) = Recorded By, (t) = Taken By, (c) = Cosigned By      Initials Name Provider Type     Calixto Allen, PT Student PT Student                  Therapy Charges for Today       Code Description Service Date Service Provider Modifiers Qty    [id number]  PT THERAPEUTIC ACT EA 15 MIN 5/10/2024 Calixto Allen, PT Student GP 2            PT G-Codes  Outcome Measure Options: (P) AM-PAC 6 Clicks Basic Mobility (PT)  AM-PAC 6 Clicks Score (PT): (P) 9  AM-PAC 6 Clicks Score (OT): 10    VIV Roa  5/10/2024

## 2024-05-10 NOTE — PLAN OF CARE
Goal Outcome Evaluation:  Plan of Care Reviewed With: patient        Progress: no change  Outcome Evaluation: [place] [facility] [place] new concerns at this time, daughter at the bedside. No new concerns at this time. Will continue plan of care.

## 2024-05-10 NOTE — CONSULTS
[facility]   Consult Note    Patient Name: Devi Bobo  : 1945  MRN: [id number]  Primary Care Physician: Raad Del Toro MD  Referring Physician: No ref. provider found  Date of admission: 2024    Subjective   Subjective     Reason for Consult: Atypical chest pain    HPI:  Devi Bobo is a 79 y.o. female with history of Parkinson disease apparently patient refused her medications, and become progressively weak currently she is barely able to eat I have discussed with the family that she is getting worse every day, family concerned about the possibility of infection.  Patient is resting comfortable without painful respiratory distress but I am unable to get any history at present time for her.  Family indicated that in the past she had urosepsis, apparently she was living with a family member, since admission to the hospital she became worse that according the family present in the room.  EKG shows sinus rhythm possible anteroseptal infarct, no acute ST segment changes.  No significant change from EKG done on April 15, and EKG done on 2023. Patient potassium is 4.3 sodium is 138 blood sugar is elevated today was 118 iron saturation is low, white blood cell count is 12.8 but better neutrophils were elevated but came down to 83.7 echocardiogram done on 2024 showed normal LV function, diastolic dysfunction and aortic sclerosis without significant obstruction.    Review of Systems  Review of Systems unable to obtain    Personal History     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)     Hypertension     Lesion of sciatic nerve, bilateral lower limbs     Osteoporosis     Overactive  bladder     Piriformis syndrome     Polyp of colon     Postmenopausal atrophic vaginitis     Vaginal atrophy        Past Surgical History:   Procedure Laterality Date    COLONOSCOPY  2019    LEEP N/A 1/6/2022    Procedure: LOOP ELECTROCAUTERY EXCISION PROCEDURE;  Surgeon: El Álvarez MD;  Location: [facility] MAIN OR;  Service: Gynecology;  Laterality: N/A;    TUBAL ABDOMINAL LIGATION         Family History: family history includes Colon cancer in her sister; Heart disease in her father, mother, and sister; Thyroid disease in her mother and sister. Otherwise pertinent FHx was reviewed and not pertinent to current issue.    Social History:  reports that she has never smoked. She has never used smokeless tobacco. She reports that she does not currently use alcohol. She reports that she does not currently use drugs.    Home Medications:  Calcium Carbonate-Vitamin D, HYDROcodone-acetaminophen, denosumab, estradiol, gabapentin, lisinopril, and multivitamin with minerals    Hospital Medications:    Current Facility-Administered Medications:     acetaminophen (TYLENOL) tablet 650 mg, 650 mg, Oral, Q4H PRN, Lamont Prince MD    aluminum-magnesium hydroxide-simethicone (MAALOX MAX) 400-400-40 MG/5ML suspension 15 mL, 15 mL, Oral, Q6H PRN, Lamont Prince MD    sennosides-docusate (PERICOLACE) 8.6-50 MG per tablet 2 tablet, 2 tablet, Oral, BID PRN **AND** polyethylene glycol (MIRALAX) packet 17 g, 17 g, Oral, Daily PRN **AND** bisacodyl (DULCOLAX) EC tablet 5 mg, 5 mg, Oral, Daily PRN **AND** bisacodyl (DULCOLAX) suppository 10 mg, 10 mg, Rectal, Daily PRN, Lamont Prince MD    carbidopa-levodopa (SINEMET)  MG per tablet 1 tablet, 1 tablet, Oral, TID, Hayder Doe Jr., MD, 1 tablet at 05/10/24 5241    clonazePAM (KlonoPIN) tablet 0.25 mg, 0.25 mg, Oral, BID PRN, Lamont Prince MD    dextrose 5 % and sodium chloride 0.9 % with KCl 20 mEq/L infusion, 50 mL/hr, Intravenous, Continuous, Lamont Prince MD, Last Rate: 50  mL/hr at 05/10/24 0113, 50 mL/hr at 05/10/24 0113    Enoxaparin Sodium (LOVENOX) syringe 30 mg, 30 mg, Subcutaneous, Daily, Lamont Prince MD, 30 mg at 05/10/24 09    famotidine (PEPCID) tablet 40 mg, 40 mg, Oral, Daily, Lamont Prince MD, 40 mg at 05/10/24 0912    HYDROcodone-acetaminophen (NORCO) 5-325 MG per tablet 1 tablet, 1 tablet, Oral, Q4H PRN, Lamont Prince MD    metoprolol succinate XL (TOPROL-XL) 24 hr tablet 25 mg, 25 mg, Oral, Q12H, Lamont Prince MD, 25 mg at 05/10/24 0912    [] morphine injection 2 mg, 2 mg, Intravenous, Q2H PRN **AND** naloxone (NARCAN) injection 0.4 mg, 0.4 mg, Intravenous, Q5 Min PRN, Lamont Prince MD    [] HYDROmorphone (DILAUDID) injection 0.5 mg, 0.5 mg, Intravenous, Q2H PRN **AND** naloxone (NARCAN) injection 0.4 mg, 0.4 mg, Intravenous, Q5 Min PRN, Lamont Prince MD    ondansetron (ZOFRAN) injection 4 mg, 4 mg, Intravenous, Q4H PRN, Lamont Prince MD    Pharmacy to Dose enoxaparin (LOVENOX), , Does not apply, Continuous PRN, Lamont Prince MD    sodium chloride 0.9 % flush 10 mL, 10 mL, Intravenous, PRN, Oscar Arteaga MD    sodium chloride 0.9 % flush 10 mL, 10 mL, Intravenous, Q12H, Lamont Prince MD, 10 mL at 05/10/24 0913    sodium chloride 0.9 % flush 10 mL, 10 mL, Intravenous, PRN, Lamont Prince MD    sodium chloride 0.9 % infusion 40 mL, 40 mL, Intravenous, PRN, Lamont Prince MD    temazepam (RESTORIL) capsule 15 mg, 15 mg, Oral, Nightly PRN, Lamont Prince MD, 15 mg at 24 9297    Allergies:  Allergies   Allergen Reactions    Sulfa Antibiotics Anaphylaxis    Nitrofurantoin Confusion       Objective    Objective   Vitals:  Temp:  [97.1 °F (36.2 °C)-99.8 °F (37.7 °C)] 98.8 °F (37.1 °C)  Heart Rate:  [] 93  Resp:  [16-20] 16  BP: (108-157)/(55-94) 157/65    Physical Exam:  Physical Exam  Vitals reviewed: Patient appears to be malnourished, pale.   HENT:      Head: Normocephalic and atraumatic.      Nose: Nose normal.   Eyes:      Conjunctiva/sclera: Conjunctivae  normal.   Cardiovascular:      Rate and Rhythm: Normal rate and regular rhythm.   Pulmonary:      Breath sounds: No rales.   Abdominal:      Palpations: Abdomen is soft.   Musculoskeletal:      Right lower leg: No edema.   Skin:     General: Skin is warm.   Neurological:      Comments: As per Dr. Doe neurology   Psychiatric:      Comments: Unable to evaluate           Result Review    Result Review:  I have personally reviewed the results from the time of this admission to 05/10/24 7:48 PM EDT and agree with these findings:  [x]  Laboratory  []  Microbiology  []  Radiology  [x]  EKG/Telemetry   []  Cardiology/Vascular   []  Pathology  []  Old records  []  Other:    Most notable findings include: 79-year-old white female admitted to hospital because of general weakness that apparently became worse patient not taking her medications for Parkinson disease, her cardiac condition appears to be stable LV function is normal EKG showed no acute changes and no significant change from prior EKGs patient apparently has some chest pain atypical in the recent past, also she has anxiety according to family because she is scared to falling.  Patient has history of recurrent falls.    Assessment & Plan   Assessment / Plan     Active Hospital Problems:  Active Hospital Problems    Diagnosis     **Generalized weakness     Tachycardia     Parkinson disease     Failure to thrive in adult     Chest pain, atypical     Generalized anxiety disorder     Recurrent falls        Plan:   As per internal medicine and neurology, I will follow the patient as needed.  Thank you    Electronically signed by Demarco Gomez MD, 05/10/24, 7:48 PM EDT.

## 2024-05-10 NOTE — PROGRESS NOTES
[facility]     Progress Note    Patient Name: Devi Bobo  : 1945  MRN: [id number]  Primary Care Physician:  Raad Del Toro MD  Date of admission: 2024    Subjective   Subjective     Chief Complaint: Patient more alert today General condition remains the same she has Parkinson's disease probably responsible for her problem waiting for the rehab placement    HPI: Able and doing well waiting for rehab placement no new finding    Review of Systems   All systems were reviewed and negative except for: Has been reviewed and discussed    Objective   Objective     Vitals:   Temp:  [97.1 °F (36.2 °C)-99.8 °F (37.7 °C)] 99.8 °F (37.7 °C)  Heart Rate:  [] 74  Resp:  [16-20] 16  BP: (108-168)/(59-94) 117/59    Physical Exam    Constitutional: Awake, alert   Eyes: PERRLA, sclerae anicteric, no conjunctival injection   HENT: NCAT, mucous membranes moist   Neck: Supple, no thyromegaly, no lymphadenopathy, trachea midline   Respiratory: Clear to auscultation bilaterally, nonlabored respirations    Cardiovascular: RRR, no murmurs, rubs, or gallops, palpable pedal pulses bilaterally   Gastrointestinal: Positive bowel sounds, soft, nontender, nondistended   Musculoskeletal: No bilateral ankle edema, no clubbing or cyanosis to extremities   Psychiatric: Appropriate affect, cooperative   Neurologic: Oriented x 3, strength symmetric in all extremities, Cranial Nerves grossly intact to confrontation, speech clear   Skin: No rashes   No change alert oriented  Result Review    Result Review:  I have personally reviewed the results from the time of this admission to 5/10/2024 12:13 EDT and agree with these findings:  [x]  Laboratory  []  Microbiology  [x]  Radiology  []  EKG/Telemetry   []  Cardiology/Vascular   []  Pathology  []  Old records  []  Other:  Most notable findings include: Has been reviewed and discussed    Assessment & Plan   Assessment / Plan     Brief Patient Summary:  Devi Bobo is a 79  y.o. female who patient with Parkinson's disease cachexia possible dementia and seizure disorder    Active Hospital Problems:  Active Hospital Problems    Diagnosis     **Generalized weakness     Tachycardia     Parkinson disease     Failure to thrive in adult     Chest pain, atypical     Generalized anxiety disorder     Recurrent falls        Plan:   Will continue current management    DVT prophylaxis:  Medical and mechanical DVT prophylaxis orders are present.        CODE STATUS:   Code Status (Patient has no pulse and is not breathing): CPR (Attempt to Resuscitate)  Medical Interventions (Patient has pulse or is breathing): Full Support    Disposition:  I expect patient to be discharged rehab pending.    Electronically signed by Zuhair Huang MD, 05/10/24, 12:13 PM EDT.      Part of this note may be an electronic transcription/translation of spoken language to printed text using the Dragon Dictation System.

## 2024-05-11 LAB
ALBUMIN SERPL-MCNC: 3.3 G/DL (ref 3.5–5.2)
ALBUMIN/GLOB SERPL: 1.1 G/DL
ALP SERPL-CCNC: 178 U/L (ref 39–117)
ALT SERPL W P-5'-P-CCNC: 30 U/L (ref 1–33)
ANION GAP SERPL CALCULATED.3IONS-SCNC: 11.5 MMOL/L (ref 5–15)
AST SERPL-CCNC: 75 U/L (ref 1–32)
BASOPHILS # BLD AUTO: 0.08 10*3/MM3 (ref 0–0.2)
BASOPHILS NFR BLD AUTO: 0.6 % (ref 0–1.5)
BILIRUB SERPL-MCNC: 0.6 MG/DL (ref 0–1.2)
BILIRUB UR QL STRIP: NEGATIVE
BUN SERPL-MCNC: 11 MG/DL (ref 8–23)
BUN/CREAT SERPL: 13.9 (ref 7–25)
CALCIUM SPEC-SCNC: 8.7 MG/DL (ref 8.6–10.5)
CHLORIDE SERPL-SCNC: 104 MMOL/L (ref 98–107)
CLARITY UR: CLEAR
CO2 SERPL-SCNC: 19.5 MMOL/L (ref 22–29)
COLOR UR: YELLOW
CREAT SERPL-MCNC: 0.79 MG/DL (ref 0.57–1)
DEPRECATED RDW RBC AUTO: 49.7 FL (ref 37–54)
EGFRCR SERPLBLD CKD-EPI 2021: 77.2 ML/MIN/1.73
EOSINOPHIL # BLD AUTO: 0.03 10*3/MM3 (ref 0–0.4)
EOSINOPHIL NFR BLD AUTO: 0.2 % (ref 0.3–6.2)
ERYTHROCYTE [DISTWIDTH] IN BLOOD BY AUTOMATED COUNT: 13.4 % (ref 12.3–15.4)
GLOBULIN UR ELPH-MCNC: 2.9 GM/DL
GLUCOSE SERPL-MCNC: 117 MG/DL (ref 65–99)
GLUCOSE UR STRIP-MCNC: NEGATIVE MG/DL
HCT VFR BLD AUTO: 39.9 % (ref 34–46.6)
HGB BLD-MCNC: 11.9 G/DL (ref 12–15.9)
HGB UR QL STRIP.AUTO: NEGATIVE
IMM GRANULOCYTES # BLD AUTO: 0.03 10*3/MM3 (ref 0–0.05)
IMM GRANULOCYTES NFR BLD AUTO: 0.2 % (ref 0–0.5)
KETONES UR QL STRIP: NEGATIVE
LEUKOCYTE ESTERASE UR QL STRIP.AUTO: NEGATIVE
LYMPHOCYTES # BLD AUTO: 1.15 10*3/MM3 (ref 0.7–3.1)
LYMPHOCYTES NFR BLD AUTO: 8.7 % (ref 19.6–45.3)
MCH RBC QN AUTO: 29.8 PG (ref 26.6–33)
MCHC RBC AUTO-ENTMCNC: 29.8 G/DL (ref 31.5–35.7)
MCV RBC AUTO: 100 FL (ref 79–97)
MONOCYTES # BLD AUTO: 1.84 10*3/MM3 (ref 0.1–0.9)
MONOCYTES NFR BLD AUTO: 13.9 % (ref 5–12)
NEUTROPHILS NFR BLD AUTO: 10.07 10*3/MM3 (ref 1.7–7)
NEUTROPHILS NFR BLD AUTO: 76.4 % (ref 42.7–76)
NITRITE UR QL STRIP: NEGATIVE
NRBC BLD AUTO-RTO: 0 /100 WBC (ref 0–0.2)
PH UR STRIP.AUTO: 5.5 [PH] (ref 5–8)
PLATELET # BLD AUTO: 224 10*3/MM3 (ref 140–450)
PMV BLD AUTO: 11.2 FL (ref 6–12)
POTASSIUM SERPL-SCNC: 4.1 MMOL/L (ref 3.5–5.2)
PROT SERPL-MCNC: 6.2 G/DL (ref 6–8.5)
PROT UR QL STRIP: NEGATIVE
RBC # BLD AUTO: 3.99 10*6/MM3 (ref 3.77–5.28)
SODIUM SERPL-SCNC: 135 MMOL/L (ref 136–145)
SP GR UR STRIP: 1.01 (ref 1–1.03)
UROBILINOGEN UR QL STRIP: NORMAL
WBC NRBC COR # BLD AUTO: 13.2 10*3/MM3 (ref 3.4–10.8)

## 2024-05-11 PROCEDURE — 85025 COMPLETE CBC W/AUTO DIFF WBC: CPT | Performed by: INTERNAL MEDICINE

## 2024-05-11 PROCEDURE — 25010000002 ENOXAPARIN PER 10 MG: Performed by: INTERNAL MEDICINE

## 2024-05-11 PROCEDURE — 81003 URINALYSIS AUTO W/O SCOPE: CPT | Performed by: INTERNAL MEDICINE

## 2024-05-11 PROCEDURE — 80053 COMPREHEN METABOLIC PANEL: CPT | Performed by: INTERNAL MEDICINE

## 2024-05-11 RX ADMIN — FAMOTIDINE 40 MG: 20 TABLET ORAL at 08:28

## 2024-05-11 RX ADMIN — METOPROLOL SUCCINATE 25 MG: 25 TABLET, EXTENDED RELEASE ORAL at 08:33

## 2024-05-11 RX ADMIN — SENNOSIDES AND DOCUSATE SODIUM 2 TABLET: 50; 8.6 TABLET ORAL at 08:28

## 2024-05-11 RX ADMIN — POTASSIUM CHLORIDE, DEXTROSE MONOHYDRATE AND SODIUM CHLORIDE 50 ML/HR: 150; 5; 900 INJECTION, SOLUTION INTRAVENOUS at 22:36

## 2024-05-11 RX ADMIN — Medication 10 ML: at 08:29

## 2024-05-11 RX ADMIN — ACETAMINOPHEN 650 MG: 325 TABLET ORAL at 15:21

## 2024-05-11 RX ADMIN — METOPROLOL SUCCINATE 25 MG: 25 TABLET, EXTENDED RELEASE ORAL at 20:11

## 2024-05-11 RX ADMIN — ACETAMINOPHEN 650 MG: 325 TABLET ORAL at 00:22

## 2024-05-11 RX ADMIN — ENOXAPARIN SODIUM 30 MG: 100 INJECTION SUBCUTANEOUS at 08:28

## 2024-05-11 NOTE — PLAN OF CARE
Goal Outcome Evaluation:            Vitals stable during shift. Complaints of neck pain x1. See MAR. Family remained at bedside. Pt still pleasantly confused. Continue care plan.

## 2024-05-11 NOTE — PROGRESS NOTES
[facility]   Progress Note    Patient Name: Devi Bobo  : 1945  MRN: [id number]  Primary Care Physician: Raad Del Toro MD  Date of admission: 2024    Subjective   Subjective     HPI:  Patient reports patient alert reported no chest pain or shortness of breath she indicated that she ate something today with the family but her food for lunch is entirely there, patient was told that she needs to eat, she really denies any swallowing difficulty, patient apparently was diagnosed to have parkinsonian by Dr. Doe, family is reluctant to give her Parkinson medications because apparently she got worse, family concerned about some infection, this is investigated by Dr. Huang and Dr. Barroso: Dr. Prince, her heart appears to be normal on the echo done recently EKG showed no significant change from EKG done in the past.  Patient not having any chest pain or palpitations, patient blood pressure is stable temperature was 100.9 on 5/10/24 for 2 days is normal, her white blood cell count is elevated.  Patient BMI is low patient echocardiogram read recently was mentioned in the consult as well as her EKG.    Review of Systems  Review of Systems difficult to obtain patient is not eating, she is malnourished now, apparently family is feeding her.    Objective   Objective     Vitals:  Temp:  [98.1 °F (36.7 °C)-100.9 °F (38.3 °C)] 98.1 °F (36.7 °C)  Heart Rate:  [75-97] 86  Resp:  [16-20] 18  BP: (127-157)/(55-75) 128/60    Physical Exam:  Physical Exam  Constitutional:       Appearance: Normal appearance.   HENT:      Head: Normocephalic.      Mouth/Throat:      Mouth: Mucous membranes are moist.   Eyes:      Extraocular Movements: Extraocular movements intact.   Cardiovascular:      Rate and Rhythm: Normal rate and regular rhythm.   Pulmonary:      Breath sounds: No rales.   Abdominal:      Palpations: Abdomen is soft.   Musculoskeletal:      Right lower leg: No edema.   Skin:     Coloration: Skin is  pale. Skin is not jaundiced.   Neurological:      Mental Status: She is alert.      Comments: As per neurology   Psychiatric:      Comments: Patient may be depressed         Result Review    Result Review:  I have personally reviewed the results from the time of this admission to 05/11/24 12:19 PM EDT and agree with these findings:  [x]  Laboratory  []  Microbiology  []  Radiology  []  EKG/Telemetry   []  Cardiology/Vascular   []  Pathology  []  Old records  []  Other:    Most notable findings include: 79-year-old white female currently looks weak, may have a neurologic issue could be parkinsonian needs to be evaluated by neurology and internal medicine, also possible infection is currently evaluated by internal medicine, I think her cardiovascular condition appears to be stable, patient denies any chest pain or shortness of breath    Assessment & Plan   Assessment / Plan     Active Hospital Problems:  Active Hospital Problems    Diagnosis     **Generalized weakness     Tachycardia     Parkinson disease     Failure to thrive in adult     Chest pain, atypical     Generalized anxiety disorder     Recurrent falls        Plan:   Continue as per internal medicine    DVT prophylaxis: As per internal medicine    CODE STATUS:    Code Status and Medical Interventions:   Ordered at: 05/06/24 1216     Code Status (Patient has no pulse and is not breathing):    CPR (Attempt to Resuscitate)     Medical Interventions (Patient has pulse or is breathing):    Full Support       Disposition:  I expect patient to be discharged as per internal medicine.    Electronically signed by Demarco Gomez MD, 05/11/24, 12:19 PM EDT.

## 2024-05-11 NOTE — PLAN OF CARE
Goal Outcome Evaluation:  Plan of Care Reviewed With: patient has been slight confused on this shift, Pt had fever throughout the night, RN given tylenol, family members @ bed side, plan  of care on going.

## 2024-05-11 NOTE — PROGRESS NOTES
[facility]     Progress Note    Patient Name: Devi Bobo  : 1945  MRN: [id number]  Primary Care Physician:  Raad Del Toro MD  Date of admission: 2024      Subjective   Brief summary.  Patient admitted with multiple symptoms including recurrent fall inability to walk, palpitations, anxiety and chest pain, grandson at bedside today      HPI:  GEN more awake and alert, heart rate better but still having episodes where heart rate goes into 150s for few seconds to minutes.  Patient denies any chest pain, talking today, weak but better than yesterday.        Review of Systems     Weak and lethargic.  No chest pain, no shortness of breath.  Restlessness improving      Objective     Vitals:   Temp:  [97.1 °F (36.2 °C)-99.8 °F (37.7 °C)] 98.8 °F (37.1 °C)  Heart Rate:  [] 93  Resp:  [16-20] 16  BP: (108-157)/(55-94) 157/65    Physical Exam :     Elderly female not in acute distress cachectic.  Heart regular.   Lungs clear.  Abdomen soft.  Neurologically awake alert but restless.  Extremities no edema      Result Review:  I have personally reviewed the results from the time of this admission to 5/10/2024 20:10 EDT and agree with these findings:  [x]  Laboratory  []  Microbiology  []  Radiology  []  EKG/Telemetry   []  Cardiology/Vascular   []  Pathology  []  Old records  []  Other:          Assessment / Plan       Active Hospital Problems:  Active Hospital Problems    Diagnosis    • **Generalized weakness    • Tachycardia    • Parkinson disease    • Failure to thrive in adult    • Chest pain, atypical    • Generalized anxiety disorder    • Recurrent falls        Plan:   Still having episodes of brief.'s of tachycardia/SVT.  Will consult cardiologist, patient responding to Sinemet.  Plan was to discontinue yesterday but continued last night and she looks much better.  Tried to reach her son several times but unable to get hold of him.  Spoke to patient's grandson.  Will continue current  treatment plan.  Evaluate for inpatient rehab.       DVT prophylaxis:  Medical and mechanical DVT prophylaxis orders are present.        CODE STATUS:   Code Status (Patient has no pulse and is not breathing): CPR (Attempt to Resuscitate)  Medical Interventions (Patient has pulse or is breathing): Full Support                Electronically signed by Lamont Prince MD, 05/10/24, 8:12 PM EDT.

## 2024-05-11 NOTE — PROGRESS NOTES
[facility]     Progress Note    Patient Name: Devi Bobo  : 1945  MRN: [id number]  Primary Care Physician:  Raad Del Toro MD  Date of admission: 2024      Subjective         HPI:  Following up on weakness and tremors  Family is at bedside.  Patient does not engage in conversation.  According to the family patient continues to be weak, however, tremors resolved  Appetite is improved  Review of Systems     Weak and lethargic.  No chest pain, no shortness of breath.  Restlessness improving      Objective     Vitals:   Temp:  [98 °F (36.7 °C)-100.9 °F (38.3 °C)] 100.2 °F (37.9 °C)  Heart Rate:  [81-97] 87  Resp:  [16-20] 18  BP: (128-157)/(52-75) 135/72    Physical Exam :     Elderly female not in acute distress cachectic.  Heart regular.   Lungs clear.  Abdomen soft.  Neurologically awake alert but restless.  Extremities no edema      Result Review:  I have personally reviewed the results from the time of this admission to 2024 15:39 EDT and agree with these findings:  [x]  Laboratory  []  Microbiology  []  Radiology  []  EKG/Telemetry   []  Cardiology/Vascular   []  Pathology  []  Old records  []  Other:          Assessment / Plan       Active Hospital Problems:  Active Hospital Problems    Diagnosis     **Generalized weakness     Tachycardia     Parkinson disease     Failure to thrive in adult     Chest pain, atypical     Generalized anxiety disorder     Recurrent falls        Plan:   Continue current management.  Obtain urinalysis  Physical and Occupational Therapy  Discussed with family       DVT prophylaxis:  Medical and mechanical DVT prophylaxis orders are present.        CODE STATUS:   Code Status (Patient has no pulse and is not breathing): CPR (Attempt to Resuscitate)  Medical Interventions (Patient has pulse or is breathing): Full Support                                 History of Present Illness

## 2024-05-11 NOTE — PROGRESS NOTES
[facility]     Progress Note    Patient Name: Devi Bobo  : 1945  MRN: [id number]  Primary Care Physician:  Raad Del Toro MD  Date of admission: 2024    Subjective   Subjective     Chief Complaint: Patient more alert and oriented today, discussed with the whole family being present, they state that she has gotten worse since she has been taking the medication for Parkinson's clonazepam and carbidopa as per the request we will put them on hold he can have a regular diet, concerned about UTI so we will get a urinary tract cultures again, to get her out of bed and ambulate [place] length with the family no evidence of malignant,    HPI: With urinalysis and culture will try to get her out of bed and ambulate    Review of Systems   All systems were reviewed and negative except for: Has been reviewed    Objective   Objective     Vitals:   Temp:  [98.1 °F (36.7 °C)-100.9 °F (38.3 °C)] 98.1 °F (36.7 °C)  Heart Rate:  [75-97] 86  Resp:  [16-20] 18  BP: (127-157)/(55-75) 128/60    Physical Exam    Constitutional: Awake, alert   Eyes: PERRLA, sclerae anicteric, no conjunctival injection   HENT: NCAT, mucous membranes moist   Neck: Supple, no thyromegaly, no lymphadenopathy, trachea midline   Respiratory: Clear to auscultation bilaterally, nonlabored respirations    Cardiovascular: RRR, no murmurs, rubs, or gallops, palpable pedal pulses bilaterally   Gastrointestinal: Positive bowel sounds, soft, nontender, nondistended   Musculoskeletal: No bilateral ankle edema, no clubbing or cyanosis to extremities   Psychiatric: Appropriate affect, cooperative   Neurologic: Oriented x 3, strength symmetric in all extremities, Cranial Nerves grossly intact to confrontation, speech clear   Skin: No rashes   No change  Result Review    Result Review:  I have personally reviewed the results from the time of this admission to 2024 10:46 EDT and agree with these findings:  [x]  Laboratory  []  Microbiology  [x]   Radiology  []  EKG/Telemetry   []  Cardiology/Vascular   []  Pathology  []  Old records  []  Other:  Most notable findings include: Reviewed and discussed    Assessment & Plan   Assessment / Plan     Brief Patient Summary:  Devi Bobo is a 79 y.o. female who able and doing well will continue the current    Active Hospital Problems:  Active Hospital Problems    Diagnosis     **Generalized weakness     Tachycardia     Parkinson disease     Failure to thrive in adult     Chest pain, atypical     Generalized anxiety disorder     Recurrent falls        Plan:   Will repeat the urine cultures will ambulate, hold on the medications for Parkinson's as family requested    DVT prophylaxis:  Medical and mechanical DVT prophylaxis orders are present.        CODE STATUS:   Code Status (Patient has no pulse and is not breathing): CPR (Attempt to Resuscitate)  Medical Interventions (Patient has pulse or is breathing): Full Support    Disposition:  I expect patient to be discharged after the patient has been stabilized.    Electronically signed by Zuhair Huang MD, 05/11/24, 10:46 AM EDT.      Part of this note may be an electronic transcription/translation of spoken language to printed text using the Dragon Dictation System.

## 2024-05-12 LAB
ANION GAP SERPL CALCULATED.3IONS-SCNC: 10.5 MMOL/L (ref 5–15)
BASOPHILS # BLD AUTO: 0.04 10*3/MM3 (ref 0–0.2)
BASOPHILS NFR BLD AUTO: 0.4 % (ref 0–1.5)
BUN SERPL-MCNC: 16 MG/DL (ref 8–23)
BUN/CREAT SERPL: 21.9 (ref 7–25)
CALCIUM SPEC-SCNC: 8.7 MG/DL (ref 8.6–10.5)
CHLORIDE SERPL-SCNC: 107 MMOL/L (ref 98–107)
CO2 SERPL-SCNC: 21.5 MMOL/L (ref 22–29)
CREAT SERPL-MCNC: 0.73 MG/DL (ref 0.57–1)
DEPRECATED RDW RBC AUTO: 45.3 FL (ref 37–54)
EGFRCR SERPLBLD CKD-EPI 2021: 84.8 ML/MIN/1.73
EOSINOPHIL # BLD AUTO: 0.1 10*3/MM3 (ref 0–0.4)
EOSINOPHIL NFR BLD AUTO: 1.1 % (ref 0.3–6.2)
ERYTHROCYTE [DISTWIDTH] IN BLOOD BY AUTOMATED COUNT: 13.2 % (ref 12.3–15.4)
GLUCOSE SERPL-MCNC: 110 MG/DL (ref 65–99)
HCT VFR BLD AUTO: 33.7 % (ref 34–46.6)
HGB BLD-MCNC: 10.7 G/DL (ref 12–15.9)
IMM GRANULOCYTES # BLD AUTO: 0.04 10*3/MM3 (ref 0–0.05)
IMM GRANULOCYTES NFR BLD AUTO: 0.4 % (ref 0–0.5)
LYMPHOCYTES # BLD AUTO: 0.78 10*3/MM3 (ref 0.7–3.1)
LYMPHOCYTES NFR BLD AUTO: 8.4 % (ref 19.6–45.3)
MCH RBC QN AUTO: 29.5 PG (ref 26.6–33)
MCHC RBC AUTO-ENTMCNC: 31.8 G/DL (ref 31.5–35.7)
MCV RBC AUTO: 92.8 FL (ref 79–97)
MONOCYTES # BLD AUTO: 1.01 10*3/MM3 (ref 0.1–0.9)
MONOCYTES NFR BLD AUTO: 10.9 % (ref 5–12)
NEUTROPHILS NFR BLD AUTO: 7.27 10*3/MM3 (ref 1.7–7)
NEUTROPHILS NFR BLD AUTO: 78.8 % (ref 42.7–76)
NRBC BLD AUTO-RTO: 0 /100 WBC (ref 0–0.2)
PLATELET # BLD AUTO: 272 10*3/MM3 (ref 140–450)
PMV BLD AUTO: 10.4 FL (ref 6–12)
POTASSIUM SERPL-SCNC: 4.3 MMOL/L (ref 3.5–5.2)
RBC # BLD AUTO: 3.63 10*6/MM3 (ref 3.77–5.28)
SODIUM SERPL-SCNC: 139 MMOL/L (ref 136–145)
WBC NRBC COR # BLD AUTO: 9.24 10*3/MM3 (ref 3.4–10.8)

## 2024-05-12 PROCEDURE — 25010000002 ENOXAPARIN PER 10 MG: Performed by: INTERNAL MEDICINE

## 2024-05-12 PROCEDURE — 85025 COMPLETE CBC W/AUTO DIFF WBC: CPT | Performed by: INTERNAL MEDICINE

## 2024-05-12 PROCEDURE — 80048 BASIC METABOLIC PNL TOTAL CA: CPT | Performed by: INTERNAL MEDICINE

## 2024-05-12 RX ADMIN — ACETAMINOPHEN 650 MG: 325 TABLET ORAL at 20:41

## 2024-05-12 RX ADMIN — METOPROLOL SUCCINATE 25 MG: 25 TABLET, EXTENDED RELEASE ORAL at 20:41

## 2024-05-12 RX ADMIN — FAMOTIDINE 40 MG: 20 TABLET ORAL at 08:23

## 2024-05-12 RX ADMIN — METOPROLOL SUCCINATE 25 MG: 25 TABLET, EXTENDED RELEASE ORAL at 08:23

## 2024-05-12 RX ADMIN — Medication 10 ML: at 08:23

## 2024-05-12 RX ADMIN — POTASSIUM CHLORIDE, DEXTROSE MONOHYDRATE AND SODIUM CHLORIDE 50 ML/HR: 150; 5; 900 INJECTION, SOLUTION INTRAVENOUS at 18:55

## 2024-05-12 RX ADMIN — TEMAZEPAM 15 MG: 15 CAPSULE ORAL at 20:41

## 2024-05-12 RX ADMIN — ENOXAPARIN SODIUM 30 MG: 100 INJECTION SUBCUTANEOUS at 08:23

## 2024-05-12 NOTE — PLAN OF CARE
Goal Outcome Evaluation:            Pt pleasantly confused during shift. Family at bedside throughout shift. Pt had 6 beats of vtach this morning on flex monitor. Dr. Gomez aware. Pt very anxious this evening during bed check and change. Pt became tachy on monitor. Kept reassuring pt that everything was going to be okay. Continue care plan.

## 2024-05-12 NOTE — PROGRESS NOTES
[facility]     Progress Note    Patient Name: Devi Bobo  : 1945  MRN: [id number]  Primary Care Physician:  Raad Del Toro MD  Date of admission: 2024    Subjective   Subjective     Chief Complaint: Stable and doing well there are no more seizures no more rigidity patient alert oriented not in acute distress discussed with the patient and the family her son by the bedside    HPI: Stable family to discuss regarding taking care of her at home possible discharge tomorrow if stable    Review of Systems   All systems were reviewed and negative except for: Has been reviewed    Objective   Objective     Vitals:   Temp:  [98 °F (36.7 °C)-100.2 °F (37.9 °C)] 99 °F (37.2 °C)  Heart Rate:  [81-96] 96  Resp:  [18] 18  BP: (125-147)/(52-83) 144/78    Physical Exam    Constitutional: Awake, alert   Eyes: PERRLA, sclerae anicteric, no conjunctival injection   HENT: NCAT, mucous membranes moist   Neck: Supple, no thyromegaly, no lymphadenopathy, trachea midline   Respiratory: Clear to auscultation bilaterally, nonlabored respirations    Cardiovascular: RRR, no murmurs, rubs, or gallops, palpable pedal pulses bilaterally   Gastrointestinal: Positive bowel sounds, soft, nontender, nondistended   Musculoskeletal: No bilateral ankle edema, no clubbing or cyanosis to extremities   Psychiatric: Appropriate affect, cooperative   Neurologic: Oriented x 3, strength symmetric in all extremities, Cranial Nerves grossly intact to confrontation, speech clear   Skin: No rashes   No change  Result Review    Result Review:  I have personally reviewed the results from the time of this admission to 2024 10:37 EDT and agree with these findings:  [x]  Laboratory  []  Microbiology  [x]  Radiology  []  EKG/Telemetry   []  Cardiology/Vascular   []  Pathology  []  Old records  []  Other:  Most notable findings include: Has been reviewed and discussed    Assessment & Plan   Assessment / Plan     Brief Patient  Summary:  Devi Bobo is a 79 y.o. female who able and doing well possible discharge tomorrow if if stable    Active Hospital Problems:  Active Hospital Problems    Diagnosis     **Generalized weakness     Tachycardia     Parkinson disease     Failure to thrive in adult     Chest pain, atypical     Generalized anxiety disorder     Recurrent falls        Plan:   Continue the supportive care    DVT prophylaxis:  Medical and mechanical DVT prophylaxis orders are present.        CODE STATUS:   Code Status (Patient has no pulse and is not breathing): CPR (Attempt to Resuscitate)  Medical Interventions (Patient has pulse or is breathing): Full Support    Disposition:  I expect patient to be discharged possible tomorrow family trying to make arrangement and decide.    Electronically signed by Zuhair Huang MD, 05/12/24, 10:37 AM EDT.      Part of this note may be an electronic transcription/translation of spoken language to printed text using the Dragon Dictation System.

## 2024-05-12 NOTE — PROGRESS NOTES
[facility]   Progress Note    Patient Name: Devi Bobo  : 1945  MRN: [id number]  Primary Care Physician: Raad Del Toro MD  Date of admission: 2024    Subjective   Subjective     Chief Complaint:  Follow-up on weakness and tremors    History of Present Illness  Does not complain of tremors.  Feels slightly better      Review of Systems   Constitutional:  Positive for activity change and fatigue.       Objective   Objective     Vitals:  Temp:  [98.1 °F (36.7 °C)-100.2 °F (37.9 °C)] 99 °F (37.2 °C)  Heart Rate:  [87-96] 90  Resp:  [18] 18  BP: (125-147)/(59-83) 129/70    Physical Exam  Constitutional:       Appearance: Normal appearance.   Cardiovascular:      Rate and Rhythm: Normal rate and regular rhythm.   Pulmonary:      Effort: Pulmonary effort is normal.   Neurological:      Mental Status: She is alert.         Result Review    Result Review:  I have personally reviewed the results from the time of this admission to 24 2:23 PM EDT and agree with these findings:  []  Laboratory  []  Microbiology  []  Radiology  []  EKG/Telemetry   []  Cardiology/Vascular   []  Pathology  []  Old records  []  Other:    Assessment & Plan   Assessment / Plan       Active Hospital Problems:  Active Hospital Problems    Diagnosis    • **Generalized weakness    • Tachycardia    • Parkinson disease    • Failure to thrive in adult    • Chest pain, atypical    • Generalized anxiety disorder    • Recurrent falls        Plan:  Continue current management.  Discharge to home in the morning                    Electronically signed by Ty Barroso MD, 24, 2:23 PM EDT.

## 2024-05-12 NOTE — PLAN OF CARE
Goal Outcome Evaluation:  Plan of Care Reviewed With: patient        Progress: no change  Outcome Evaluation: Patient resting in bed VSS, no c/o pain or discomfort, patient is alert to self , place, family in room, iv fluids infusing, call light within reach.

## 2024-05-12 NOTE — PROGRESS NOTES
[facility]   Progress Note    Patient Name: Devi Bobo  : 1945  MRN: [id number]  Primary Care Physician: Raad Del Toro MD  Date of admission: 2024    Subjective   Subjective     HPI:  Patient reports patient appears more alert and oriented today she slowly responds some questions regarding her where she was born, where she lives, had a lot of difficulty remembering the day and what she had for lunch today, currently family she is doing better she was worse, patient is followed by neurology also internal medicine regarding her mental condition her appetite is poor and she is malnourished but apparently she is getting food supplements with no difficulty, patient denying problems chewing but she does not have teeth, white blood cell count is elevated, currently there is no she had seizures.  There is also questionable history of parkinsonian family does not accept that.,  I was consulted for chest pain, echocardiogram patient has preserved LV systolic functionand has diastolic dysfunction. EKG shows sinus rhythm and no change from prior EKGs patient also had some tachycardia previously.    Review of Systems  Review of Systems difficult to obtain but she reported no shortness of breath no chest pain appears to be in no distress.    Objective   Objective     Vitals:  Temp:  [98.1 °F (36.7 °C)-99.9 °F (37.7 °C)] 98.1 °F (36.7 °C)  Heart Rate:  [88-96] 88  Resp:  [18] 18  BP: (123-147)/(59-83) 123/62    Physical Exam:  Physical Exam  Constitutional:       Comments: Patient is underweight   HENT:      Head: Normocephalic.      Nose: Nose normal.   Eyes:      Extraocular Movements: Extraocular movements intact.   Cardiovascular:      Rate and Rhythm: Normal rate and regular rhythm.      Heart sounds: No murmur heard.  Pulmonary:      Breath sounds: No rales.   Abdominal:      Palpations: Abdomen is soft.   Musculoskeletal:      Right lower leg: No edema.      Left lower leg: No edema.   Skin:      General: Skin is warm.   Neurological:      Mental Status: She is alert.         Result Review    Result Review:  I have personally reviewed the results from the time of this admission to 05/12/24 4:05 PM EDT and agree with these findings:  [x]  Laboratory  []  Microbiology  []  Radiology  []  EKG/Telemetry   []  Cardiology/Vascular   []  Pathology  []  Old records  []  Other:    Most notable findings include: 79-year-old white female admitted because of general weakness, loss of weight, chest pain and palpitations, patient was evaluated by neurology because of possible seizures Schneider may be dementia.    Assessment & Plan   Assessment / Plan     Active Hospital Problems:  Active Hospital Problems    Diagnosis    • **Generalized weakness    • Tachycardia    • Parkinson disease    • Failure to thrive in adult    • Chest pain, atypical    • Generalized anxiety disorder    • Recurrent falls        Plan:   Patient is doing clinically well from the cardiac point of view, she is not eating well, patient is malnourished, she needs to be evaluated further with neurology and also internal medicine.  Case was discussed with family.    DVT prophylaxis: As per internal medicine Dr. Wilber Werner will cover for me.  As needed.    CODE STATUS:    Code Status and Medical Interventions:   Ordered at: 05/06/24 1216     Code Status (Patient has no pulse and is not breathing):    CPR (Attempt to Resuscitate)     Medical Interventions (Patient has pulse or is breathing):    Full Support       Disposition:  I expect patient to be discharged as per internal medicine.    Electronically signed by Demarco Gomez MD, 05/12/24, 4:05 PM EDT.

## 2024-05-13 ENCOUNTER — READMISSION MANAGEMENT (OUTPATIENT)
Dept: CALL CENTER | Facility: HOSPITAL | Age: 79
End: 2024-05-13
Payer: MEDICARE

## 2024-05-13 VITALS
RESPIRATION RATE: 22 BRPM | TEMPERATURE: 97.9 F | SYSTOLIC BLOOD PRESSURE: 136 MMHG | HEIGHT: 66 IN | HEART RATE: 92 BPM | WEIGHT: 100.97 LBS | DIASTOLIC BLOOD PRESSURE: 80 MMHG | BODY MASS INDEX: 16.23 KG/M2 | OXYGEN SATURATION: 96 %

## 2024-05-13 PROBLEM — R00.0 TACHYCARDIA: Status: RESOLVED | Noted: 2024-05-07 | Resolved: 2024-05-13

## 2024-05-13 PROBLEM — R07.89 CHEST PAIN, ATYPICAL: Status: RESOLVED | Noted: 2024-05-06 | Resolved: 2024-05-13

## 2024-05-13 LAB
QT INTERVAL: 380 MS
QTC INTERVAL: 443 MS

## 2024-05-13 PROCEDURE — 25010000002 ENOXAPARIN PER 10 MG: Performed by: INTERNAL MEDICINE

## 2024-05-13 RX ORDER — METOPROLOL SUCCINATE 25 MG/1
25 TABLET, EXTENDED RELEASE ORAL EVERY 12 HOURS SCHEDULED
Qty: 60 TABLET | Refills: 0 | Status: SHIPPED | OUTPATIENT
Start: 2024-05-13 | End: 2024-06-12

## 2024-05-13 RX ORDER — FOLIC ACID 1 MG/1
1 TABLET ORAL DAILY
Status: DISCONTINUED | OUTPATIENT
Start: 2024-05-13 | End: 2024-05-13 | Stop reason: HOSPADM

## 2024-05-13 RX ADMIN — METOPROLOL SUCCINATE 25 MG: 25 TABLET, EXTENDED RELEASE ORAL at 09:11

## 2024-05-13 RX ADMIN — FAMOTIDINE 40 MG: 20 TABLET ORAL at 09:11

## 2024-05-13 RX ADMIN — Medication 1 MG: at 09:12

## 2024-05-13 RX ADMIN — ENOXAPARIN SODIUM 30 MG: 100 INJECTION SUBCUTANEOUS at 09:12

## 2024-05-13 NOTE — PLAN OF CARE
Goal Outcome Evaluation:      VSS. To discharge home in care of son with Home Health. Awaiting wheelchair to be delivered. EMS to transport patient home.     Progress: improving

## 2024-05-13 NOTE — DISCHARGE SUMMARY
[facility]         DISCHARGE SUMMARY    Patient Name: Devi Bobo  : 1945  MRN: [id number]    Date of Admission: 2024  Date of Discharge: May 13, 2024  Primary Care Physician: Raad Del Toro MD    Consults       Date and Time Order Name Status Description    5/10/2024 10:14 AM Inpatient Cardiology Consult      2024  9:27 AM Hematology & Oncology Inpatient Consult      2024  7:12 PM Inpatient Neurology Consult General      2024 11:39 AM Internal Medicine (on-call MD unless specified)              Presenting Problem:   Unsteady gait [R26.81]  Generalized weakness [R53.1]  Acute UTI (urinary tract infection) [N39.0]  Chest pain, unspecified type [R07.9]    Active and Resolved Hospital Problems:  Active Hospital Problems    Diagnosis POA    **Generalized weakness [R53.1] Yes    Parkinson disease [G20.A1] Yes    Failure to thrive in adult [R62.7] Yes    Generalized anxiety disorder [F41.1] Yes    Recurrent falls [R29.6] Not Applicable      Resolved Hospital Problems    Diagnosis POA    Tachycardia [R00.0] Yes    Chest pain, atypical [R07.89] Yes         Hospital Course     Hospital Course:  Devi Bobo is a 79 y.o. female admitted to hospital for generalized weakness, recurrent fall.  Patient was not eating good.  Patient was brought in by family member with above issues.  Workup revealed UTI and possible Parkinson disease on exam.    Patient was admitted to hospital started on IV fluids IV antibiotics were initiated PT OT was consulted.  Patient had episodes of chest pain and tachycardia for which cardiologist was consulted and beta-blockers were added.    She was seen by neurologist for her rigidity and unsteady gait.  Dr. Doe saw patient and recommended anti-Parkinson's medicines.  Patient was started on those and her symptoms started to improve but she was more sleepy.  Her son was not comfortable and wanted to stop the medicines.    She was also seen by  oncologist Dr. Huang for weight loss and possible underlying malignancy for severe cachexia and anorexia.  Workup was negative.    Patient also has extreme anxiety with nervousness and shakiness, Klonopin was given patient tremors improved.  Again family requested not to get those medicines.  Further patient's medicine was discontinued.  Patient was doing fairly well after discontinuing medication except for increased rigidity and inability to walk.  Family decided to take her home.  She will discharge to home with outpatient follow-up recommendations      DISCHARGE Follow Up Recommendations for labs and diagnostics:   Discharge to home  Follow-up with PCP in 1 week      Day of Discharge     Vital Signs:  Temp:  [98.1 °F (36.7 °C)-100 °F (37.8 °C)] 98.2 °F (36.8 °C)  Heart Rate:  [84-90] 84  Resp:  [18] 18  BP: ()/() 142/73    Physical Exam:    Elderly female cachectic, not in acute distress.  Heart regular.  Lungs clear.  Abdomen soft.  Extremities no edema neuro awake alert but having increased rigidity.  Unable to walk      Pertinent  and/or Most Recent Results     LAB RESULTS:      Lab 05/12/24  0526 05/11/24  0530 05/09/24  0507 05/08/24  0942   WBC 9.24 13.20* 12.80* 17.40*   HEMOGLOBIN 10.7* 11.9* 12.3 12.8   HEMATOCRIT 33.7* 39.9 38.0 40.9   PLATELETS 272 224 259 288   NEUTROS ABS 7.27* 10.07* 10.71* 15.30*   IMMATURE GRANS (ABS) 0.04 0.03 0.04 0.06*   LYMPHS ABS 0.78 1.15 0.70 0.71   MONOS ABS 1.01* 1.84* 1.22* 1.22*   EOS ABS 0.10 0.03 0.08 0.03   MCV 92.8 100.0* 91.8 95.1         Lab 05/12/24  0526 05/11/24  0621 05/09/24  0507 05/08/24  0942 05/06/24  1256   SODIUM 139 135* 138 138  --    POTASSIUM 4.3 4.1 4.3 4.1  --    CHLORIDE 107 104 106 106  --    CO2 21.5* 19.5* 22.6 19.3*  --    ANION GAP 10.5 11.5 9.4 12.7  --    BUN 16 11 10 9  --    CREATININE 0.73 0.79 0.88 1.02*  --    EGFR 84.8 77.2 67.8 56.8*  --    GLUCOSE 110* 117* 133* 145*  --    CALCIUM 8.7 8.7 8.5* 8.2*  --    MAGNESIUM   --   --  2.1 1.7  --    TSH  --   --   --   --  0.939         Lab 05/11/24  0621 05/09/24  0507 05/08/24  0942   TOTAL PROTEIN 6.2 6.4 6.2   ALBUMIN 3.3* 3.9 3.9   GLOBULIN 2.9 2.5 2.3   ALT (SGPT) 30 5 8   AST (SGOT) 75* 44* 17   BILIRUBIN 0.6 0.6 0.5   ALK PHOS 178* 97 79         Lab 05/06/24  1256   HSTROP T 25*             Lab 05/09/24  0507   IRON 17*   IRON SATURATION (TSAT) 6*   TIBC 298   TRANSFERRIN 200   FERRITIN 127.10   FOLATE 19.60   VITAMIN B 12 273         Brief Urine Lab Results  (Last result in the past 365 days)        Color   Clarity   Blood   Leuk Est   Nitrite   Protein   CREAT   Urine HCG        05/11/24 1153 Yellow   Clear   Negative   Negative   Negative   Negative                 Microbiology Results (last 10 days)       Procedure Component Value - Date/Time    Urine Culture - Urine, Urine, Clean Catch [[id number]] Collected: 05/06/24 0814    Lab Status: Final result Specimen: Urine, Clean Catch Updated: 05/07/24 1023     Urine Culture >100,000 CFU/mL Mixed Tiffany Isolated    Narrative:      Specimen contains mixed organisms of questionable pathogenicity suggestive of contamination. If symptoms persist, suggest recollection.  Colonization of the urinary tract without infection is common. Treatment is discouraged unless the patient is symptomatic, pregnant, or undergoing an invasive urologic procedure.            PROCEDURES:    CT Abdomen Pelvis With Contrast    Result Date: 5/8/2024  Impression: 1. Within limitations of motion no suspicious soft tissue mass or adenopathy to suggest a malignant process in the chest, abdomen or pelvis. 2. No acute infectious/inflammatory process in the chest, abdomen or pelvis. 3. Symmetric subpleural consolidative opacities in the lung apices and superior aspects of both lower lobes, appearance and symmetry favors pleural parenchymal scarring favor related to prior infectious insult. 4. Aortic and coronary atherosclerotic disease. 5. Colonic diverticulosis. 6.  Other chronic findings as above.      Electronically Signed By-Aroldo Cao MD On:5/8/2024 4:29 PM      CT Chest With Contrast Diagnostic    Result Date: 5/8/2024  Impression: 1. Within limitations of motion no suspicious soft tissue mass or adenopathy to suggest a malignant process in the chest, abdomen or pelvis. 2. No acute infectious/inflammatory process in the chest, abdomen or pelvis. 3. Symmetric subpleural consolidative opacities in the lung apices and superior aspects of both lower lobes, appearance and symmetry favors pleural parenchymal scarring favor related to prior infectious insult. 4. Aortic and coronary atherosclerotic disease. 5. Colonic diverticulosis. 6. Other chronic findings as above.      Electronically Signed By-Aroldo Cao MD On:5/8/2024 4:29 PM      MRI Brain Without Contrast    Result Date: 5/6/2024  Impression: Impression:  1. Mild generalized parenchymal volume loss. 2. No acute intracranial abnormality. Specifically no evidence of a cute infarct or hemorrhage.    Electronically Signed By-Miguelito Luis MD On:5/6/2024 4:59 PM      XR Chest 1 View    Result Date: 5/6/2024  Impression: Impression:  1. Chronic advanced emphysematous lung disease. 2. No acute process identified.   Electronically Signed By-Christian Handley MD On:5/6/2024 7:54 AM      CT Head Without Contrast    Result Date: 4/29/2024  Impression: Stable chronic findings without acute process.  Electronically Signed By-Devin Tyler MD On:4/29/2024 3:45 PM      XR Chest 1 View    Result Date: 4/29/2024  Impression: No acute process.  Electronically Signed By-Devin Tyler MD On:4/29/2024 1:08 PM      XR Chest 1 View    Result Date: 4/15/2024  Impression: Impression:  1. Mild scarring in the right upper lobe. 2. No active pulmonary disease.   Electronically Signed By-Favian Bansal MD On:4/15/2024 3:48 PM               Results for orders placed during the hospital encounter of 05/06/24    Adult Transthoracic Echo Complete W/  Cont if Necessary Per Protocol    Interpretation Summary    Left ventricular ejection fraction appears to be 56 - 60%.    Left ventricular diastolic function is consistent with (grade I) impaired relaxation and age.    Aortic valve sclerosis without obstruction to flow.      Labs Pending at Discharge:        Discharge Details        Discharge Medications        New Medications        Instructions Start Date   metoprolol succinate XL 25 MG 24 hr tablet  Commonly known as: TOPROL-XL   25 mg, Oral, Every 12 Hours Scheduled             Continue These Medications        Instructions Start Date   Calcium Carbonate-Vitamin D 600-200 MG-UNIT capsule   1 capsule, Oral, Daily      estradiol 0.1 MG/GM vaginal cream  Commonly known as: ESTRACE   Insert 1 gram into the vagina 2-3 times a week      gabapentin 600 MG tablet  Commonly known as: NEURONTIN   600 mg, Oral, 3 Times Daily      HYDROcodone-acetaminophen 5-325 MG per tablet  Commonly known as: NORCO   1 tablet, Oral, Every 8 Hours PRN      multivitamin with minerals tablet tablet   1 tablet, Oral, Daily      Prolia 60 MG/ML solution prefilled syringe syringe  Generic drug: denosumab   60 mg, Subcutaneous, Every 6 Months, Last dose: 9/21/22             Stop These Medications      lisinopril 10 MG tablet  Commonly known as: PRINIVIL,ZESTRIL              Allergies   Allergen Reactions    Sulfa Antibiotics Anaphylaxis    Nitrofurantoin Confusion         Discharge Disposition:    Home-Health Care [facility]    Diet:  Regular      Discharge Activity:     Activity Instructions       Activity as Tolerated              Future Appointments   Date Time Provider Department Center   6/13/2024  1:30 PM Tavon Maza DPM [facility]       Additional Instructions for the Follow-ups that You Need to Schedule       Discharge Follow-up with PCP   As directed       Currently Documented PCP:    Raad Del Toro MD    PCP Phone Number:    [phone]     Follow Up Details: in 2-3  days        Discharge Follow-up with Specified Provider: Dr Gomez in 1 week   As directed      To: Dr Gomez in 1 week                Time spent on Discharge including face to face service: 31 minutes.            I have dictated this note utilizing Dragon Dictation.             Please note that portions of this note were completed with a voice recognition program.             Part of this note may be an electronic transcription/translation of spoken language to printed text         using the Dragon Dictation System.       Electronically signed by Lamont Prince MD, 05/13/24, 9:30 AM EDT.

## 2024-05-13 NOTE — PROGRESS NOTES
[facility]     Progress Note    Patient Name: Devi Bobo  : 1945  MRN: [id number]  Primary Care Physician:  Raad Del Toro MD  Date of admission: 2024    Subjective   Subjective     Chief Complaint: Patient having episodes of confusion otherwise most of the time she is stable alert and oriented disposition waiting no new findings no tremors no seizure    HPI: Stable and doing well continue current management     Review of Systems   All systems were reviewed and negative except for: Has been reviewed and discussed    Objective   Objective     Vitals:   Temp:  [98.1 °F (36.7 °C)-100 °F (37.8 °C)] 98.2 °F (36.8 °C)  Heart Rate:  [84-96] 84  Resp:  [18] 18  BP: ()/() 142/73    Physical Exam    Constitutional: Awake, alert   Eyes: PERRLA, sclerae anicteric, no conjunctival injection   HENT: NCAT, mucous membranes moist   Neck: Supple, no thyromegaly, no lymphadenopathy, trachea midline   Respiratory: Clear to auscultation bilaterally, nonlabored respirations    Cardiovascular: RRR, no murmurs, rubs, or gallops, palpable pedal pulses bilaterally   Gastrointestinal: Positive bowel sounds, soft, nontender, nondistended   Musculoskeletal: No bilateral ankle edema, no clubbing or cyanosis to extremities   Psychiatric: Appropriate affect, cooperative   Neurologic: Oriented x 3, strength symmetric in all extremities, Cranial Nerves grossly intact to confrontation, speech clear   Skin: No rashes     Result Review    Result Review:  I have personally reviewed the results from the time of this admission to 2024 07:20 EDT and agree with these findings:  [x]  Laboratory  []  Microbiology  []  Radiology  []  EKG/Telemetry   []  Cardiology/Vascular   []  Pathology  []  Old records  []  Other:  Most notable findings include: No evidence of malignancy patient having episodes of confusion otherwise stable and doing well    Assessment & Plan   Assessment / Plan     Brief Patient  Summary:  Devi Bobo is a 79 y.o. female who discussed with the family they want to take her home    Active Hospital Problems:  Active Hospital Problems    Diagnosis     **Generalized weakness     Tachycardia     Parkinson disease     Failure to thrive in adult     Chest pain, atypical     Generalized anxiety disorder     Recurrent falls        Plan:   Continue current management    DVT prophylaxis:  Medical and mechanical DVT prophylaxis orders are present.        CODE STATUS:   Code Status (Patient has no pulse and is not breathing): CPR (Attempt to Resuscitate)  Medical Interventions (Patient has pulse or is breathing): Full Support    Disposition:  I expect patient to be discharged waiting for the family to decide about the disposition.    Electronically signed by Zuhair Huang MD, 05/13/24, 7:20 AM EDT.      Part of this note may be an electronic transcription/translation of spoken language to printed text using the Dragon Dictation System.

## 2024-05-13 NOTE — CONSULTS
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 Pt drinks the boost at all meals. BM 5/11, pt has stool softeners ordered PRN.      Anthropometrics        Current Height, Weight Height: 167.6 cm (66\")  Weight: 45.8 kg (100 lb 15.5 oz)   Current BMI Body mass index is 16.3 kg/m².   BMI Classification Underweight   % IBW 76%   Adjusted Body Weight (ABW)    Weight Hx  Wt Readings from Last 30 Encounters:   05/06/24 1537 45.8 kg (100 lb 15.5 oz)   05/06/24 0726 45.8 kg (101 lb)   04/29/24 1400 46.8 kg (103 lb 2.8 oz)   04/15/24 1500 48.2 kg (106 lb 4.2 oz)   03/14/24 1308 46.7 kg (103 lb)   01/03/24 1612 48.6 kg (107 lb 2.3 oz)   12/11/23 1931 48.5 kg (107 lb)   08/08/23 1500 48.8 kg (107 lb 8.6 oz)   05/20/23 0647 48.2 kg (106 lb 4.2 oz)   05/08/23 1316 48.8 kg (107 lb 9.4 oz)   04/20/23 1039 48.4 kg (106 lb 11.2 oz)   04/03/23 0626 48.1 kg (106 lb 0.7 oz)   04/02/23 0702 48.2 kg (106 lb 4.2 oz) " "  04/01/23 0218 42.7 kg (94 lb 2.2 oz)   03/31/23 2058 49 kg (108 lb 0.4 oz)   10/13/22 0935 52.1 kg (114 lb 12.8 oz)   09/21/22 1410 52.9 kg (116 lb 10 oz)   01/19/22 1559 53.5 kg (118 lb)   01/06/22 0853 53.9 kg (118 lb 13.3 oz)   12/01/21 1400 54 kg (119 lb)   10/20/21 1109 52.5 kg (115 lb 12.8 oz)   10/05/21 1147 52.8 kg (116 lb 6.4 oz)   12/15/20 0000 55.1 kg (121 lb 8 oz)   07/03/18 0000 52.7 kg (116 lb 2 oz)          Wt Change Observation Note wt trends trending down per EMR and pt/pt son reports wt loss in the last month.     Estimated/Assessed Needs  Estimated Needs based on: Ideal Body Weight       Energy Requirements 20-25 kcal/kg    EST Needs (kcal/day) 2257-6951 kca/d        Protein Requirements 0.8-1.1 g/kg    EST Daily Needs (g/day) 47-65 g/d        Fluid Requirements 1 ml/kcal    Estimated Needs (mL/day) 7346-5986 mL/d      Labs/Medications         Pertinent Labs Reviewed.   Results from last 7 days   Lab Units 05/12/24  0526 05/11/24  0621 05/09/24  0507 05/08/24  0942   SODIUM mmol/L 139 135* 138 138   POTASSIUM mmol/L 4.3 4.1 4.3 4.1   CHLORIDE mmol/L 107 104 106 106   CO2 mmol/L 21.5* 19.5* 22.6 19.3*   BUN mg/dL 16 11 10 9   CREATININE mg/dL 0.73 0.79 0.88 1.02*   CALCIUM mg/dL 8.7 8.7 8.5* 8.2*   BILIRUBIN mg/dL  --  0.6 0.6 0.5   ALK PHOS U/L  --  178* 97 79   ALT (SGPT) U/L  --  30 5 8   AST (SGOT) U/L  --  75* 44* 17   GLUCOSE mg/dL 110* 117* 133* 145*     Results from last 7 days   Lab Units 05/12/24  0526 05/11/24  0530 05/09/24  0507 05/08/24  0942   MAGNESIUM mg/dL  --   --  2.1 1.7   HEMOGLOBIN g/dL 10.7*   < > 12.3 12.8   HEMATOCRIT % 33.7*   < > 38.0 40.9    < > = values in this interval not displayed.     COVID19   Date Value Ref Range Status   05/20/2023 Detected (C) Not Detected - Ref. Range Final     No results found for: \"HGBA1C\"      Pertinent Medications Reviewed.     Malnutrition Severity Assessment      Patient meets criteria for : Severe Malnutrition         Nutrition " Diagnosis         Nutrition Dx Problem 1 Severe malnutrition related to  chronic illness  as evidenced by  severe subcutaneous fat loss of the triceps region and severe muscle loss of the temporal and orbital region     Nutrition Intervention           Current Nutrition Orders & Evaluation of Intake       Current PO Diet Diet: Regular/House; Fluid Consistency: Thin (IDDSI 0)   Supplement Orders Placed This Encounter      Dietary Nutrition Supplements Boost Plus (Ensure Plus); vanilla           Nutrition Intervention/Prescription        Boost TID (360 kcal, 14g PRO/each)         Medical Nutrition Therapy/Nutrition Education          Learner     Readiness Patient   Acceptance     Method     Response Explanation   Verbalizes understanding      Monitor/Evaluation        Monitor Monitor PO and supplement intake     Nutrition Discharge Plan         Recommend to continue oral nutrition supplements on discharge.      Electronically signed by:  Lynne Garcia RD  05/13/24 10:26 EDT

## 2024-05-13 NOTE — PLAN OF CARE
Goal Outcome Evaluation:              Outcome Evaluation: Pt resting well, VSS, no c/o pain through the night. Patient restless and confused in the evening, until sleeping well.

## 2024-05-13 NOTE — SIGNIFICANT NOTE
05/13/24 1229   Physical Therapy Time and Intention   Session Not Performed   (Pt has orders for discharge shortly.  Awaiting delivery of wheelchair per  note.)

## 2024-05-14 NOTE — OUTREACH NOTE
Prep Survey      Flowsheet Row Responses   [facility] facility patient discharged from? [facility]   Is LACE score < 7 ? No   Eligibility Readm Mgmt   Discharge diagnosis Generalized weakness   Does the patient have one of the following disease processes/diagnoses(primary or secondary)? Other   Does the patient have Home health ordered? Yes   What is the Home health agency?  [facility]   Is there a DME ordered? Yes   What DME was ordered? W/C from [employer]   Prep survey completed? Yes            Marychuy GTZ - Registered Nurse

## 2024-05-15 ENCOUNTER — READMISSION MANAGEMENT (OUTPATIENT)
Dept: CALL CENTER | Facility: HOSPITAL | Age: 79
End: 2024-05-15
Payer: MEDICARE

## 2024-05-15 NOTE — OUTREACH NOTE
Medical Week 1 Survey      Flowsheet Row Responses   [facility] patient discharged from? [facility]   Does the patient have one of the following disease processes/diagnoses(primary or secondary)? Other   Week 1 attempt successful? Yes   Call start time 1345   Call end time 1348   Discharge diagnosis Generalized weakness   Person spoke with today (if not patient) and relationship Son   Meds reviewed with patient/caregiver? Yes   Is the patient having any side effects they believe may be caused by any medication additions or changes? No   Does the patient have all medications ordered at discharge? Yes   Is the patient taking all medications as directed (includes completed medication regime)? Yes   Does the patient have a primary care provider?  Yes   Does the patient have an appointment with their PCP within 7 days of discharge? Yes   Has the patient kept scheduled appointments due by today? N/A   What is the Home health agency?  [facility]   Has home health visited the patient within 72 hours of discharge? Yes   Psychosocial issues? No   What is the patient's perception of their health status since discharge? Improving   Week 1 call completed? Yes   Call end time 1348            Margarita GTZ - Registered Nurse

## 2024-08-18 PROCEDURE — 87186 SC STD MICRODIL/AGAR DIL: CPT | Performed by: NURSE PRACTITIONER

## 2024-08-18 PROCEDURE — 87086 URINE CULTURE/COLONY COUNT: CPT | Performed by: NURSE PRACTITIONER

## 2024-08-18 PROCEDURE — 87077 CULTURE AEROBIC IDENTIFY: CPT | Performed by: NURSE PRACTITIONER

## 2024-08-19 ENCOUNTER — PATIENT ROUNDING (BHMG ONLY) (OUTPATIENT)
Dept: URGENT CARE | Facility: CLINIC | Age: 79
End: 2024-08-19
Payer: MEDICARE

## 2024-08-19 NOTE — ED NOTES
Thank you for letting us care for you in your recent visit to our urgent care center. We would love to hear about your experience with us. Was this the first time you have visited our location?    We’re always looking for ways to make our patients’ experiences even better. Do you have any recommendations on ways we may improve?     I appreciate you taking the time to respond. Please be on the lookout for a survey about your recent visit from [employer] via text or email. We would greatly appreciate if you could fill that out and turn it back in. We want your voice to be heard and we value your feedback.   Thank you for choosing [facility] for your healthcare needs.

## 2024-08-20 ENCOUNTER — TELEPHONE (OUTPATIENT)
Dept: URGENT CARE | Facility: CLINIC | Age: 79
End: 2024-08-20
Payer: MEDICARE

## 2024-08-20 DIAGNOSIS — R30.0 DYSURIA: Primary | ICD-10-CM

## 2024-08-20 DIAGNOSIS — N39.0 ACUTE UTI: ICD-10-CM

## 2024-08-20 RX ORDER — AMOXICILLIN 875 MG/1
875 TABLET, COATED ORAL 2 TIMES DAILY
Qty: 20 TABLET | Refills: 0 | Status: SHIPPED | OUTPATIENT
Start: 2024-08-20 | End: 2024-08-30

## 2024-08-20 NOTE — TELEPHONE ENCOUNTER
Called patient, urine culture results reviewed with the patient and her son, patient states that she is feeling better, but is not 100% better, will stop Keflex and start amoxicillin.  Patient to follow-up with PCP, here in our office or at the ED as needed or if symptoms worsen or persist.  Patient verbalizes understanding.

## 2024-08-29 ENCOUNTER — OFFICE VISIT (OUTPATIENT)
Dept: PODIATRY | Facility: CLINIC | Age: 79
End: 2024-08-29
Payer: MEDICARE

## 2024-08-29 VITALS
BODY MASS INDEX: 15.76 KG/M2 | WEIGHT: 104 LBS | HEART RATE: 97 BPM | OXYGEN SATURATION: 94 % | HEIGHT: 68 IN | DIASTOLIC BLOOD PRESSURE: 82 MMHG | SYSTOLIC BLOOD PRESSURE: 138 MMHG | TEMPERATURE: 98.6 F

## 2024-08-29 DIAGNOSIS — B35.1 ONYCHOMYCOSIS: ICD-10-CM

## 2024-08-29 DIAGNOSIS — M79.672 FOOT PAIN, BILATERAL: ICD-10-CM

## 2024-08-29 DIAGNOSIS — L60.0 ONYCHOCRYPTOSIS: ICD-10-CM

## 2024-08-29 DIAGNOSIS — M79.671 FOOT PAIN, BILATERAL: ICD-10-CM

## 2024-08-29 DIAGNOSIS — L60.2 ONYCHOGRYPHOSIS: Primary | ICD-10-CM

## 2024-08-29 NOTE — PROGRESS NOTES
[facility] - PODIATRY    Today's Date: 08/29/24    Patient Name: Devi Bobo  MRN: [id number]  CSN: [id number]  PCP: Raad Del Toro MD, Last PCP Visit:  2/7/2024  Referring Provider: No ref. provider found    SUBJECTIVE     Chief Complaint   Patient presents with    Left Ankle - Edema         Right Ankle - Edema     HPI: Devi Bobo, a 79 y.o.female, comes to clinic.    New, Established, New Problem: Established    Location:  Toenails    Duration:   Greater than one year    Onset:  Gradual    Nature:  sore with palpation.    Stable, worsening, improving:   Improving    Aggravating factors:  Pain with shoe gear and ambulation.    Previous Treatment:  Debridement    Medical changes:  Recenrly treated for UTI at    on antibiotics.    Patient denies any fevers, chills, nausea, vomiting, shortness of breath, nor any other constitutional signs nor symptoms.       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)     Hypertension     Lesion of sciatic nerve, bilateral lower limbs     Osteoporosis     Overactive bladder     Piriformis syndrome     Polyp of colon     Postmenopausal atrophic vaginitis     Vaginal atrophy      Past Surgical History:   Procedure Laterality Date    COLONOSCOPY  2019    LEEP N/A 1/6/2022    Procedure: LOOP ELECTROCAUTERY EXCISION PROCEDURE;  Surgeon: El Álvarez MD;  Location: [facility] MAIN OR;  Service: Gynecology;  Laterality: N/A;    TUBAL ABDOMINAL LIGATION       Family History   Problem Relation Age of Onset    Thyroid disease Mother     Heart disease Mother     Heart disease Father     Thyroid disease Sister     Heart disease Sister     Colon cancer Sister       Social History     Socioeconomic History    Marital status:     Number of children: 3    Years of education: 10   Tobacco Use    Smoking status: Never    Smokeless tobacco: Never    Tobacco comments:     current non smoker unknown if ever smoked   Vaping Use    Vaping status: Never Used   Substance and Sexual Activity    Alcohol use: Not Currently    Drug use: Not Currently    Sexual activity: Not Currently     Allergies   Allergen Reactions    Sulfa Antibiotics Anaphylaxis    Nitrofurantoin Confusion     Current Outpatient Medications   Medication Sig Dispense Refill    amoxicillin (AMOXIL) 875 MG tablet Take 1 tablet by mouth 2 (Two) Times a Day for 10 days. 20 tablet 0    Calcium Carbonate-Vitamin D 600-200 MG-UNIT capsule Take 1 capsule by mouth Daily.      denosumab (Prolia) 60 MG/ML solution prefilled syringe syringe Inject 1 mL under the skin into the appropriate area as directed Every 6 (Six) Months. Last dose: 9/21/22      estradiol (ESTRACE) 0.1 MG/GM vaginal cream Insert 1 gram into the vagina 2-3 times a week 42 g 6    gabapentin (NEURONTIN) 600 MG tablet Take 1 tablet by mouth 3 (Three) Times a Day.      HYDROcodone-acetaminophen (NORCO) 5-325 MG per tablet Take 1 tablet by mouth Every 8 (Eight) Hours As Needed.      metoprolol succinate XL (TOPROL-XL) 25 MG 24 hr tablet Take 1 tablet by mouth Daily.      multivitamin with minerals tablet tablet Take 1 tablet by mouth Daily.      phenazopyridine (PYRIDIUM) 200 MG tablet Take 1 tablet by mouth 3 (Three) Times a Day As Needed for Bladder Spasms or Dysuria. 6 tablet 0    metoprolol succinate XL (TOPROL-XL) 25 MG 24 hr tablet Take 1 tablet by mouth Every 12 (Twelve) Hours for 30 days. 60 tablet 0     No current facility-administered medications for this visit.     Review of Systems   Constitutional: Negative.    Skin:         Painful toenails.   All other systems reviewed and are negative.      OBJECTIVE     Vitals:    08/29/24 1453   BP: 138/82    Pulse: 97   Temp: 98.6 °F (37 °C)   SpO2: 94%       Patient seen in no apparent distress.      PHYSICAL EXAM:     Foot/Ankle Exam    GENERAL  Appearance:  elderly, chronically ill and frail  Orientation:  AAOx3  Affect:  appropriate  Gait:  antalgic  Assistance:  wheelchair  Right shoe gear: casual shoe  Left shoe gear: casual shoe    VASCULAR     Right Foot Vascularity   Dorsalis pedis:  1+  Posterior tibial:  1+  Skin temperature:  warm  Edema grading:  None  CFT:  < 3 seconds  Pedal hair growth:  Absent  Varicosities:  moderate varicosities     Left Foot Vascularity   Dorsalis pedis:  1+  Posterior tibial:  1+  Skin temperature:  warm  Edema grading:  None  CFT:  < 3 seconds  Pedal hair growth:  Absent  Varicosities:  moderate varicosities     NEUROLOGIC     Right Foot Neurologic   Light touch sensation: diminished  Vibratory sensation: diminished  Hot/Cold sensation: diminished  Protective Sensation using [place] Monofilament:   Sites intact: 2  Sites tested: 10     Left Foot Neurologic   Light touch sensation: diminished  Vibratory sensation: diminished  Hot/Cold sensation:  diminished  Protective Sensation using [place] Monofilament:   Sites intact: 2  Sites tested: 10    MUSCLE STRENGTH     Right Foot Muscle Strength   Foot dorsiflexion:  4-  Foot plantar flexion:  4-  Foot inversion:  4-  Foot eversion:  4-     Left Foot Muscle Strength   Foot dorsiflexion:  3  Foot plantar flexion:  3  Foot inversion:  3  Foot eversion:  3    RANGE OF MOTION     Right Foot Range of Motion   Foot and ankle ROM within normal limits       Left Foot Range of Motion   Foot and ankle ROM within normal limits      DERMATOLOGIC      Right Foot Dermatologic   Skin  Right foot skin is intact.   Nails  1.  Positive for elongated, onychomycosis, abnormal thickness, subungual debris, dystrophic nail and ingrown toenail.  2.  Positive for elongated, onychomycosis, abnormal thickness, subungual debris, dystrophic nail  and ingrown toenail.  3.  Positive for elongated, onychomycosis, abnormal thickness, subungual debris and ingrown toenail.  Nails comment:  Toenails 1, 2, 3, 4, and 5     Left Foot Dermatologic   Skin  Left foot skin is intact.   Nails comment:  Toenails 1, 2, 3, 4, and 5  Nails  1.  Positive for elongated, onychomycosis, abnormal thickness, subungual debris, dystrophic nail and ingrown toenail.  2.  Positive for elongated, onychomycosis, abnormal thickness, subungual debris, dystrophic nail and ingrown toenail.  3.  Positive for elongated, onychomycosis, abnormal thickness, subungual debris and ingrown toenail.  4.  Positive for elongated, onychomycosis, abnormally thick, subungual debris and ingrown toenail.      ASSESSMENT/PLAN     Diagnoses and all orders for this visit:    1. Onychogryphosis (Primary)    2. Onychomycosis    3. Onychocryptosis    4. Foot pain, bilateral        Comprehensive lower extremity examination and evaluation was performed.    Discussed findings and treatment plan including risks, benefits, and treatment options with patient in detail. Patient agreed with treatment plan.    Toenails 1, 2, 3 on Right and 1, 2, 3, 4 on Left were debrided with nail nippers then filed with a Dremel nail layo.  Patient tolerated procedure well without complications.    An After Visit Summary was printed and given to the patient at discharge, including (if requested) any available informative/educational handouts regarding diagnosis, treatment, or medications. All questions were answered to patient/family satisfaction. Should symptoms fail to improve or worsen they agree to call or return to clinic or to go to the Emergency Department. Discussed the importance of following up with any needed screening tests/labs/specialist appointments and any requested follow-up recommended by me today. Importance of maintaining follow-up discussed and patient accepts that missed appointments can delay diagnosis and  potentially lead to worsening of conditions.    Return in about 9 weeks (around 10/31/2024) for Toenail Care., or sooner if acute issues arise.    This document has been electronically signed by Tavon Maza DPM on August 29, 2024 15:32 EDT

## 2024-09-04 ENCOUNTER — HOSPITAL ENCOUNTER (EMERGENCY)
Facility: HOSPITAL | Age: 79
Discharge: HOME | End: 2024-09-04
Attending: EMERGENCY MEDICINE
Payer: MEDICARE

## 2024-09-04 VITALS
TEMPERATURE: 98 F | RESPIRATION RATE: 16 BRPM | SYSTOLIC BLOOD PRESSURE: 112 MMHG | HEART RATE: 70 BPM | DIASTOLIC BLOOD PRESSURE: 64 MMHG | OXYGEN SATURATION: 99 % | HEIGHT: 66 IN | WEIGHT: 106.5 LBS | BODY MASS INDEX: 17.12 KG/M2

## 2024-09-04 DIAGNOSIS — R10.2 VAGINAL PAIN: Primary | ICD-10-CM

## 2024-09-04 DIAGNOSIS — N95.2 ATROPHIC VAGINITIS: ICD-10-CM

## 2024-09-04 LAB
BILIRUB UR QL STRIP: NEGATIVE
CANDIDA SPECIES: NEGATIVE
CLARITY UR: CLEAR
COLOR UR: YELLOW
GARDNERELLA VAGINALIS: NEGATIVE
GLUCOSE UR STRIP-MCNC: NEGATIVE MG/DL
HGB UR QL STRIP.AUTO: NEGATIVE
KETONES UR QL STRIP: NEGATIVE
LEUKOCYTE ESTERASE UR QL STRIP.AUTO: NEGATIVE
NITRITE UR QL STRIP: NEGATIVE
PH UR STRIP.AUTO: 6.5 [PH] (ref 5–8)
PROT UR QL STRIP: NEGATIVE
SP GR UR STRIP: 1.01 (ref 1–1.03)
T VAGINALIS DNA VAG QL PROBE+SIG AMP: NEGATIVE
UROBILINOGEN UR QL STRIP: NORMAL

## 2024-09-04 PROCEDURE — 87510 GARDNER VAG DNA DIR PROBE: CPT

## 2024-09-04 PROCEDURE — 99283 EMERGENCY DEPT VISIT LOW MDM: CPT

## 2024-09-04 PROCEDURE — 87480 CANDIDA DNA DIR PROBE: CPT

## 2024-09-04 PROCEDURE — 81003 URINALYSIS AUTO W/O SCOPE: CPT

## 2024-09-04 PROCEDURE — 87660 TRICHOMONAS VAGIN DIR PROBE: CPT

## 2024-09-04 RX ORDER — AMOXICILLIN AND CLAVULANATE POTASSIUM 500; 125 MG/1; MG/1
1 TABLET, FILM COATED ORAL 3 TIMES DAILY
COMMUNITY
Start: 2024-08-30

## 2024-09-04 RX ORDER — HYDROCODONE BITARTRATE AND ACETAMINOPHEN 5; 325 MG/1; MG/1
1 TABLET ORAL ONCE
Status: COMPLETED | OUTPATIENT
Start: 2024-09-04 | End: 2024-09-04

## 2024-09-04 RX ADMIN — HYDROCODONE BITARTRATE AND ACETAMINOPHEN 1 TABLET: 5; 325 TABLET ORAL at 13:05

## 2024-09-04 NOTE — DISCHARGE INSTRUCTIONS
Please follow with your primary care provider in 1 week if symptoms persist.  Return to the ED for fevers greater than 4, increasing pain, or any other new or worsening concerning symptoms.

## 2024-09-04 NOTE — ED PROVIDER NOTES
Time: 10:51 AM EDT  Date of encounter:  9/4/2024  Independent Historian/Clinical History and Information was obtained by:   Patient    History is limited by: N/A    Chief Complaint: Pelvic pain      History of Present Illness:  Patient is a 79 y.o. year old female who presents to the emergency department for evaluation of pelvic pain ongoing for 2 weeks.  Patient reports she was diagnosed with at her PCP office on her second round of antibiotic therapy for this.  Reports she is currently taking Augmentin.  Patient reports some relief at beginning of illness, while taking Pyridium.  Denies any abdominal pain, back pain, dysuria or hematuria.  Reports vaginal pain.  Denies any trauma to the vaginal area or unusual discharge.      Patient Care Team  Primary Care Provider: Raad Del Toro MD    Past Medical History:     Allergies   Allergen Reactions    Sulfa Antibiotics Anaphylaxis    Nitrofurantoin Confusion     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)     Hypertension     Lesion of sciatic nerve, bilateral lower limbs     Osteoporosis     Overactive bladder     Piriformis syndrome     Polyp of colon     Postmenopausal atrophic vaginitis     Vaginal atrophy      Past Surgical History:   Procedure Laterality Date    COLONOSCOPY  2019    LEEP N/A 1/6/2022    Procedure: LOOP ELECTROCAUTERY EXCISION PROCEDURE;  Surgeon: El Álvarez MD;  Location: [facility] MAIN OR;  Service: Gynecology;  Laterality: N/A;    TUBAL ABDOMINAL LIGATION       Family History   Problem Relation Age of Onset    Thyroid disease Mother     Heart disease Mother     Heart disease Father     Thyroid disease Sister     Heart disease Sister      Colon cancer Sister        Home Medications:  Prior to Admission medications    Medication Sig Start Date End Date Taking? Authorizing Provider   amoxicillin-clavulanate (AUGMENTIN) 500-125 MG per tablet Take 1 tablet by mouth 3 times a day. 8/30/24  Yes Nai Clements MD   Calcium Carbonate-Vitamin D 600-200 MG-UNIT capsule Take 1 capsule by mouth Daily.    Nai Clements MD   estradiol (ESTRACE) 0.1 MG/GM vaginal cream Insert 1 gram into the vagina 2-3 times a week 1/12/24   Sanjiv Kelly APRN   gabapentin (NEURONTIN) 600 MG tablet Take 1 tablet by mouth 3 (Three) Times a Day. 9/4/21   Nai Clements MD   HYDROcodone-acetaminophen (NORCO) 5-325 MG per tablet Take 1 tablet by mouth Every 8 (Eight) Hours As Needed. 10/11/22   Nai Clements MD   metoprolol succinate XL (TOPROL-XL) 25 MG 24 hr tablet Take 1 tablet by mouth Every 12 (Twelve) Hours for 30 days. 5/13/24 6/12/24  Lamont Prince MD   metoprolol succinate XL (TOPROL-XL) 25 MG 24 hr tablet Take 1 tablet by mouth Daily.    Nai Clements MD   multivitamin with minerals tablet tablet Take 1 tablet by mouth Daily.    Nai Clements MD   phenazopyridine (PYRIDIUM) 200 MG tablet Take 1 tablet by mouth 3 (Three) Times a Day As Needed for Bladder Spasms or Dysuria. 8/18/24   Anushka Valentine APRN   denosumab (Prolia) 60 MG/ML solution prefilled syringe syringe Inject 1 mL under the skin into the appropriate area as directed Every 6 (Six) Months. Last dose: 9/21/22 9/4/24  Nai Clements MD        Social History:   Social History     Tobacco Use    Smoking status: Never    Smokeless tobacco: Never    Tobacco comments:     current non smoker unknown if ever smoked   Vaping Use    Vaping status: Never Used   Substance Use Topics    Alcohol use: Not Currently    Drug use: Not Currently         Review of Systems:  Review of Systems   Constitutional:  Negative for chills, fatigue and fever.   HENT:  Negative  "for ear pain, rhinorrhea and sore throat.    Eyes:  Negative for visual disturbance.   Respiratory:  Negative for cough and shortness of breath.    Cardiovascular:  Negative for chest pain.   Gastrointestinal:  Negative for abdominal pain, diarrhea and vomiting.   Genitourinary:  Positive for vaginal pain. Negative for difficulty urinating, dysuria, vaginal bleeding and vaginal discharge.   Musculoskeletal:  Negative for arthralgias, back pain and myalgias.   Skin:  Negative for rash.   Neurological:  Negative for light-headedness and headaches.   Hematological:  Negative for adenopathy.   Psychiatric/Behavioral: Negative.          Physical Exam:  /64 (BP Location: Right arm, Patient Position: Lying)   Pulse 70   Temp 98 °F (36.7 °C) (Oral)   Resp 16   Ht 167.6 cm (66\")   Wt 48.3 kg (106 lb 8 oz)   SpO2 99%   BMI 17.19 kg/m²     Physical Exam  Vitals and nursing note reviewed.   Constitutional:       General: She is not in acute distress.     Appearance: Normal appearance. She is not toxic-appearing.   HENT:      Head: Normocephalic and atraumatic.      Nose: Nose normal.      Mouth/Throat:      Mouth: Mucous membranes are moist.   Eyes:      Conjunctiva/sclera: Conjunctivae normal.   Cardiovascular:      Rate and Rhythm: Normal rate.      Pulses: Normal pulses.      Heart sounds: Normal heart sounds.   Pulmonary:      Effort: Pulmonary effort is normal.      Breath sounds: Normal breath sounds.   Abdominal:      General: Bowel sounds are normal.      Palpations: Abdomen is soft.      Tenderness: There is no abdominal tenderness.   Genitourinary:     General: Normal vulva.      Pubic Area: No rash.       Labia:         Right: No rash, tenderness, lesion or injury.         Left: No rash, tenderness, lesion or injury.    Musculoskeletal:         General: Normal range of motion.      Cervical back: Normal range of motion.   Skin:     General: Skin is warm and dry.   Neurological:      General: No focal " deficit present.      Mental Status: She is alert and oriented to person, place, and time.   Psychiatric:         Mood and Affect: Mood normal.         Behavior: Behavior normal.         Thought Content: Thought content normal.         Judgment: Judgment normal.                  Procedures:  Procedures      Medical Decision Making:      Comorbidities that affect care:    Hypertension    External Notes reviewed:    None      The following orders were placed and all results were independently analyzed by me:  Orders Placed This Encounter   Procedures    Gardnerella vaginalis, Trichomonas vaginalis, Candida albicans, DNA - Swab, Vagina    Urinalysis With Microscopic If Indicated (No Culture) - Urine, Clean Catch       Medications Given in the Emergency Department:  Medications   HYDROcodone-acetaminophen (NORCO) 5-325 MG per tablet 1 tablet (1 tablet Oral Given 9/4/24 1305)        ED Course:         Labs:    Lab Results (last 24 hours)       Procedure Component Value Units Date/Time    Urinalysis With Microscopic If Indicated (No Culture) - Urine, Clean Catch [[id number]]  (Normal) Collected: 09/04/24 1140    Specimen: Urine, Clean Catch Updated: 09/04/24 1153     Color, UA Yellow     Appearance, UA Clear     pH, UA 6.5     Specific Gravity, UA 1.011     Glucose, UA Negative     Ketones, UA Negative     Bilirubin, UA Negative     Blood, UA Negative     Protein, UA Negative     Leuk Esterase, UA Negative     Nitrite, UA Negative     Urobilinogen, UA 0.2 E.U./dL    Narrative:      Urine microscopic not indicated.    Gardnerella vaginalis, Trichomonas vaginalis, Candida albicans, DNA - Swab, Vagina [[id number]]  (Normal) Collected: 09/04/24 1201    Specimen: Swab from Vagina Updated: 09/04/24 1307     GARDNERELLA VAGINALIS Negative     TRICHOMONAS VAGINALIS Negative     CANDIDA SPECIES Negative             Imaging:    No Radiology Exams Resulted Within Past 24 Hours      Differential Diagnosis and Discussion:    Pelvic  Pain: Differential diagnosis includes but is not limited to ectopic pregnancy, ovarian torsion, dysmenorrhea, tubo-ovarian abscess, ovarian cyst, ovulation, oophoritis, abdominal pregnancy, appendicitis, diverticulitis, cystitis, and renal colic    All labs were reviewed and interpreted by me.    MDM  Number of Diagnoses or Management Options  Atrophic vaginitis  Vaginal pain  Diagnosis management comments: I have explained the patient´s condition, diagnoses and treatment plan based on the information available to me at this time. I have answered questions and addressed any concerns. The patient has a good  understanding of the patient´s diagnosis, condition, and treatment plan as can be expected at this point. The vital signs have been stable. The patient´s condition is stable and appropriate for discharge from the emergency department.      The patient will pursue further outpatient evaluation with the primary care physician or other designated or consulting physician as outlined in the discharge instructions. They are agreeable to this plan of care and follow-up instructions have been explained in detail. The patient has received these instructions in written format and has expressed an understanding of the discharge instructions. The patient is aware that any significant change in condition or worsening of symptoms should prompt an immediate return to this or the closest emergency department or call to 911.                   Patient Care Considerations:    ANTIBIOTICS: I considered prescribing antibiotics as an outpatient however no bacterial focus of infection was found.      Consultants/Shared Management Plan:    None    Social Determinants of Health:    Patient has presented with family members who are responsible, reliable and will ensure follow up care.      Disposition and Care Coordination:    Discharged: The patient is suitable and stable for discharge with no need for consideration of admission.    I  have explained the patient´s condition, diagnoses and treatment plan based on the information available to me at this time. I have answered questions and addressed any concerns. The patient has a good  understanding of the patient´s diagnosis, condition, and treatment plan as can be expected at this point. The vital signs have been stable. The patient´s condition is stable and appropriate for discharge from the emergency department.      The patient will pursue further outpatient evaluation with the primary care physician or other designated or consulting physician as outlined in the discharge instructions. They are agreeable to this plan of care and follow-up instructions have been explained in detail. The patient has received these instructions in written format and has expressed an understanding of the discharge instructions. The patient is aware that any significant change in condition or worsening of symptoms should prompt an immediate return to this or the closest emergency department or call to 911.  I have explained discharge medications and the need for follow up with the patient/caretakers. This was also printed in the discharge instructions. Patient was discharged with the following medications and follow up:      Medication List      No changes were made to your prescriptions during this visit.      Raad Del Toro MD  [address] [address]  [phone]    Go to   As needed       Final diagnoses:   Vaginal pain   Atrophic vaginitis        ED Disposition       ED Disposition   Discharge    Condition   Stable    Comment   --               This medical record created using voice recognition software.             Tawanna Chatterjee, APRN  09/04/24 [id number]

## 2024-09-11 ENCOUNTER — TRANSCRIBE ORDERS (OUTPATIENT)
Dept: ADMINISTRATIVE | Facility: HOSPITAL | Age: 79
End: 2024-09-11
Payer: MEDICARE

## 2024-09-11 DIAGNOSIS — M81.0 SENILE OSTEOPOROSIS: Primary | ICD-10-CM

## 2024-09-19 ENCOUNTER — TRANSCRIBE ORDERS (OUTPATIENT)
Dept: LAB | Facility: HOSPITAL | Age: 79
End: 2024-09-19
Payer: MEDICARE

## 2024-09-19 ENCOUNTER — LAB (OUTPATIENT)
Dept: LAB | Facility: HOSPITAL | Age: 79
End: 2024-09-19
Payer: MEDICARE

## 2024-09-19 DIAGNOSIS — M81.0 OSTEOPOROSIS, POSTMENOPAUSAL: Primary | ICD-10-CM

## 2024-09-19 DIAGNOSIS — M81.0 OSTEOPOROSIS, POSTMENOPAUSAL: ICD-10-CM

## 2024-09-19 DIAGNOSIS — Z79.899 ENCOUNTER FOR LONG-TERM (CURRENT) USE OF OTHER MEDICATIONS: ICD-10-CM

## 2024-09-19 LAB
25(OH)D3 SERPL-MCNC: 58.8 NG/ML (ref 30–100)
CALCIUM SPEC-SCNC: 9.7 MG/DL (ref 8.6–10.5)
CREAT SERPL-MCNC: 1.11 MG/DL (ref 0.57–1)
EGFRCR SERPLBLD CKD-EPI 2021: 51.3 ML/MIN/1.73

## 2024-09-19 PROCEDURE — 82565 ASSAY OF CREATININE: CPT

## 2024-09-19 PROCEDURE — 82306 VITAMIN D 25 HYDROXY: CPT

## 2024-09-19 PROCEDURE — 36415 COLL VENOUS BLD VENIPUNCTURE: CPT

## 2024-09-19 PROCEDURE — 82310 ASSAY OF CALCIUM: CPT

## 2024-10-04 ENCOUNTER — HOSPITAL ENCOUNTER (OUTPATIENT)
Dept: BONE DENSITY | Facility: HOSPITAL | Age: 79
Discharge: HOME | End: 2024-10-04
Payer: MEDICARE

## 2024-10-04 DIAGNOSIS — M81.0 SENILE OSTEOPOROSIS: ICD-10-CM

## 2024-10-04 PROCEDURE — 77080 DXA BONE DENSITY AXIAL: CPT

## 2024-10-07 ENCOUNTER — TRANSCRIBE ORDERS (OUTPATIENT)
Dept: ADMINISTRATIVE | Facility: HOSPITAL | Age: 79
End: 2024-10-07
Payer: MEDICARE

## 2024-10-07 DIAGNOSIS — M81.0 SENILE OSTEOPOROSIS: Primary | ICD-10-CM

## 2024-10-09 DIAGNOSIS — M81.0 POSTMENOPAUSAL OSTEOPOROSIS: Primary | ICD-10-CM

## 2024-10-25 ENCOUNTER — HOSPITAL ENCOUNTER (OUTPATIENT)
Dept: INFUSION THERAPY | Facility: HOSPITAL | Age: 79
Discharge: HOME | End: 2024-10-25
Payer: MEDICARE

## 2024-10-25 VITALS
RESPIRATION RATE: 18 BRPM | HEART RATE: 87 BPM | DIASTOLIC BLOOD PRESSURE: 66 MMHG | SYSTOLIC BLOOD PRESSURE: 104 MMHG | TEMPERATURE: 98.1 F | OXYGEN SATURATION: 99 %

## 2024-10-25 DIAGNOSIS — M81.0 POSTMENOPAUSAL OSTEOPOROSIS: Primary | ICD-10-CM

## 2024-10-25 PROCEDURE — 25010000002 DENOSUMAB 60 MG/ML SOLUTION PREFILLED SYRINGE

## 2024-10-25 PROCEDURE — 96372 THER/PROPH/DIAG INJ SC/IM: CPT

## 2024-10-25 RX ADMIN — DENOSUMAB 60 MG: 60 INJECTION SUBCUTANEOUS at 14:14

## 2024-11-18 ENCOUNTER — OFFICE VISIT (OUTPATIENT)
Dept: NEUROLOGY | Facility: CLINIC | Age: 79
End: 2024-11-18
Payer: MEDICARE

## 2024-11-18 VITALS
WEIGHT: 101 LBS | BODY MASS INDEX: 16.23 KG/M2 | SYSTOLIC BLOOD PRESSURE: 108 MMHG | DIASTOLIC BLOOD PRESSURE: 64 MMHG | HEIGHT: 66 IN | HEART RATE: 80 BPM

## 2024-11-18 DIAGNOSIS — M47.14 THORACIC MYELOPATHY: ICD-10-CM

## 2024-11-18 DIAGNOSIS — G95.9 CERVICAL MYELOPATHY: Primary | ICD-10-CM

## 2024-11-18 PROCEDURE — 1159F MED LIST DOCD IN RCRD: CPT | Performed by: PSYCHIATRY & NEUROLOGY

## 2024-11-18 PROCEDURE — 3078F DIAST BP <80 MM HG: CPT | Performed by: PSYCHIATRY & NEUROLOGY

## 2024-11-18 PROCEDURE — 1160F RVW MEDS BY RX/DR IN RCRD: CPT | Performed by: PSYCHIATRY & NEUROLOGY

## 2024-11-18 PROCEDURE — 3074F SYST BP LT 130 MM HG: CPT | Performed by: PSYCHIATRY & NEUROLOGY

## 2024-11-18 PROCEDURE — 99204 OFFICE O/P NEW MOD 45 MIN: CPT | Performed by: PSYCHIATRY & NEUROLOGY

## 2024-11-18 NOTE — ASSESSMENT & PLAN NOTE
She either has a low cervical myelopathy or thoracic myelopathy.  I will order an MRI of the cervical spine and thoracic spine with contrast and they are to call me to find out the results.  I discussed with them that she does not have Parkinson's disease.  I discussed with them that treatment depends on the underlying pathology and diagnosis.  Thank you for letting me participate in her care.

## 2024-11-18 NOTE — PROGRESS NOTES
"Chief Complaint  Gait Problem    Subjective          Devi Bobo is a 79 y.o. female who presents to [facility]ERY  History of Present Illness  79-year-old woman evaluated for progressive weakness of her lower extremities.  Her son is with her today.  He states that there were walking 2 to 3 miles last year fishing.  On 4/15/2024 she was admitted to the emergency room for weakness, urinary frequency.  On 4/29/2024 she has worsening weakness and that she had fallen.  On 5 5 she was diagnosed to have Parkinson disease by another neurologist.  MRI of the brain unremarkable.  She is taking short shuffling steps and unable to  her feet according to the family.  She is here today in a wheelchair.  She states that her left leg is worse than the right leg.  She is unable to move her left leg.  The right leg is affected as well.  She states that she has both bladder and bowel problems.    I reviewed the chart and I am unable to find an MRI of the cervical and thoracic spine even though she has a diagnosis of cervical spondylosis without myelopathy and lumbosacral spondylosis without myelopathy.    Vitamin B12 is within normal limits.  There is no copper level..    Objective   Vital Signs:   /64   Pulse 80   Ht 167.6 cm (65.98\")   Wt 45.8 kg (101 lb)   BMI 16.31 kg/m²     Physical Exam   She is alert, fluent, phasic, follows commands well.  There is no facial weakness.  There is no significant weakness of the upper extremity individual muscle testing.  In the lower extremity there is significant weakness distally on the left leg in which she is 4-5 strength on dorsiflexion and eversion.  She is able to get up from a sitting standing position without assistance but with difficulty however she cannot initiate a step.  She shuffles her step and cannot keep her balance.  Reflexes normal in the right triceps but otherwise there is in the biceps, decreased in the " patellar's and absent ankles.  There is spasticity in the left ankle.  There is no ankle clonus.  Plantar tends to be extensor on the left foot.  There is a questionable sensory level in the mid thoracic spine to pinprick.        Assessment and Plan  Diagnoses and all orders for this visit:    1. Cervical myelopathy (Primary)  Assessment & Plan:  She either has a low cervical myelopathy or thoracic myelopathy.  I will order an MRI of the cervical spine and thoracic spine with contrast and they are to call me to find out the results.  I discussed with them that she does not have Parkinson's disease.  The MRI of the cervical spine and thoracic spine is abnormal    Orders:  -     Cancel: MRI Cervical Spine Without Contrast; Future  -     MRI Cervical Spine With & Without Contrast; Future    2. Thoracic myelopathy  -     MRI Thoracic Spine Without Contrast; Future         Total time spent with the patient and coordinating patient care was 45 minutes.    Follow Up  No follow-ups on file.  Patient was given instructions and counseling regarding her condition or for health maintenance advice. Please see specific information pulled into the AVS if appropriate.

## 2024-12-31 ENCOUNTER — HOSPITAL ENCOUNTER (OUTPATIENT)
Dept: MRI IMAGING | Facility: HOSPITAL | Age: 79
Discharge: HOME | End: 2024-12-31
Payer: MEDICARE

## 2024-12-31 DIAGNOSIS — G95.9 CERVICAL MYELOPATHY: ICD-10-CM

## 2024-12-31 DIAGNOSIS — M47.14 THORACIC MYELOPATHY: ICD-10-CM

## 2024-12-31 LAB
CREAT BLDA-MCNC: 0.9 MG/DL (ref 0.6–1.3)
EGFRCR SERPLBLD CKD-EPI 2021: 66 ML/MIN/1.73

## 2024-12-31 PROCEDURE — 25510000002 GADOBENATE DIMEGLUMINE 529 MG/ML SOLUTION: Performed by: PSYCHIATRY & NEUROLOGY

## 2024-12-31 PROCEDURE — 72156 MRI NECK SPINE W/O & W/DYE: CPT

## 2024-12-31 PROCEDURE — 82565 ASSAY OF CREATININE: CPT

## 2024-12-31 PROCEDURE — A9577 INJ MULTIHANCE: HCPCS | Performed by: PSYCHIATRY & NEUROLOGY

## 2024-12-31 PROCEDURE — 72146 MRI CHEST SPINE W/O DYE: CPT

## 2024-12-31 RX ADMIN — GADOBENATE DIMEGLUMINE 10 ML: 529 INJECTION, SOLUTION INTRAVENOUS at 16:53

## 2025-01-07 ENCOUNTER — TELEPHONE (OUTPATIENT)
Dept: NEUROLOGY | Facility: CLINIC | Age: 80
End: 2025-01-07
Payer: MEDICARE

## 2025-01-07 NOTE — TELEPHONE ENCOUNTER
Left  for patient to return call for the following message from Dr. Ro: MRI of cervical and thoracic spine does not explain her problems with walking.  Make a follow-up visit.

## 2025-01-10 ENCOUNTER — TELEPHONE (OUTPATIENT)
Dept: NEUROLOGY | Facility: CLINIC | Age: 80
End: 2025-01-10
Payer: MEDICARE

## 2025-01-10 NOTE — TELEPHONE ENCOUNTER
Spoke with patient at mobile phone number. Dr. Ro's response conveyed to patient that patient's MRI does not show reason for patient's difficulty walking. Patient has appt on 2/6/25 at 10:15 which patient voices that she will keep.

## 2025-01-14 ENCOUNTER — LAB (OUTPATIENT)
Dept: LAB | Facility: HOSPITAL | Age: 80
End: 2025-01-14
Payer: MEDICARE

## 2025-01-14 ENCOUNTER — TRANSCRIBE ORDERS (OUTPATIENT)
Dept: ADMINISTRATIVE | Facility: HOSPITAL | Age: 80
End: 2025-01-14
Payer: MEDICARE

## 2025-01-14 DIAGNOSIS — Z79.899 ENCOUNTER FOR LONG-TERM (CURRENT) USE OF HIGH-RISK MEDICATION: ICD-10-CM

## 2025-01-14 DIAGNOSIS — M81.0 OSTEOPOROSIS, POST-MENOPAUSAL: ICD-10-CM

## 2025-01-14 DIAGNOSIS — M81.0 OSTEOPOROSIS, POST-MENOPAUSAL: Primary | ICD-10-CM

## 2025-01-14 LAB
25(OH)D3 SERPL-MCNC: 64.5 NG/ML (ref 30–100)
CALCIUM SPEC-SCNC: 9.4 MG/DL (ref 8.6–10.5)
CALCIUM SPEC-SCNC: 9.8 MG/DL (ref 8.6–10.5)
CREAT SERPL-MCNC: 1.08 MG/DL (ref 0.57–1)
EGFRCR SERPLBLD CKD-EPI 2021: 53 ML/MIN/1.73
PTH-INTACT SERPL-MCNC: 38.8 PG/ML (ref 15–65)

## 2025-01-14 PROCEDURE — 82306 VITAMIN D 25 HYDROXY: CPT

## 2025-01-14 PROCEDURE — 83970 ASSAY OF PARATHORMONE: CPT

## 2025-01-14 PROCEDURE — 36415 COLL VENOUS BLD VENIPUNCTURE: CPT

## 2025-01-14 PROCEDURE — 82310 ASSAY OF CALCIUM: CPT

## 2025-01-14 PROCEDURE — 82565 ASSAY OF CREATININE: CPT

## 2025-02-06 ENCOUNTER — OFFICE VISIT (OUTPATIENT)
Dept: NEUROLOGY | Facility: CLINIC | Age: 80
End: 2025-02-06
Payer: MEDICARE

## 2025-02-06 VITALS
WEIGHT: 101.5 LBS | HEIGHT: 66 IN | BODY MASS INDEX: 16.31 KG/M2 | DIASTOLIC BLOOD PRESSURE: 53 MMHG | SYSTOLIC BLOOD PRESSURE: 97 MMHG | HEART RATE: 72 BPM

## 2025-02-06 DIAGNOSIS — G91.2 NORMAL PRESSURE HYDROCEPHALUS: Primary | ICD-10-CM

## 2025-02-06 PROBLEM — M47.14 THORACIC MYELOPATHY: Status: RESOLVED | Noted: 2024-11-18 | Resolved: 2025-02-06

## 2025-02-06 PROBLEM — G95.9 CERVICAL MYELOPATHY: Status: RESOLVED | Noted: 2024-11-18 | Resolved: 2025-02-06

## 2025-02-06 PROBLEM — G20.A1 PARKINSON DISEASE: Status: RESOLVED | Noted: 2024-05-07 | Resolved: 2025-02-06

## 2025-02-06 PROCEDURE — 3074F SYST BP LT 130 MM HG: CPT | Performed by: PSYCHIATRY & NEUROLOGY

## 2025-02-06 PROCEDURE — 99214 OFFICE O/P EST MOD 30 MIN: CPT | Performed by: PSYCHIATRY & NEUROLOGY

## 2025-02-06 PROCEDURE — 3078F DIAST BP <80 MM HG: CPT | Performed by: PSYCHIATRY & NEUROLOGY

## 2025-02-06 NOTE — PROGRESS NOTES
"Chief Complaint  Follow-up (Re-eval)    Subjective          Devi Bobo is a 79 y.o. female who presents to [facility]  History of Present Illness  79-year-old woman here for follow-up of her gait abnormalities.  MRI of the cervical spine and thoracic spine is unremarkable.  She has had gradual loss of ability to walk for the last year.  She also has cognitive decline according to her son who is with her today.  She does have any bladder incontinence.  I reviewed MRI of the brain with them.    Objective   Vital Signs:   BP 97/53 (BP Location: Left arm, Patient Position: Sitting, Cuff Size: Adult)   Pulse 72   Ht 167.6 cm (65.98\")   Wt 46 kg (101 lb 8 oz)   BMI 16.39 kg/m²     Physical Exam   Alert, fluent, has difficulty understanding what I told her regarding normal pressure hydrocephalus as well as showing them the video.  She asked the same question again.  Station gait she is needs assistance of 1 to get up and she has difficulty in taking and initiating steps.        Assessment and Plan  Diagnoses and all orders for this visit:    1. Normal pressure hydrocephalus (Primary)  Assessment & Plan:  I discussed with them that she has normal pressure hydrocephalus causing her gait abnormalities.  I will refer her to Dr. Britt to evaluate her for normal hydrocephalus management and treatment.  I will see her again in 3 months time for follow-up.    Orders:  -     Ambulatory Referral to Neurosurgery         Total time spent with the patient and coordinating patient care was 35 minutes.    Follow Up  No follow-ups on file.  Patient was given instructions and counseling regarding her condition or for health maintenance advice. Please see specific information pulled into the AVS if appropriate.       "

## 2025-02-06 NOTE — ASSESSMENT & PLAN NOTE
I discussed with them that she has normal pressure hydrocephalus causing her gait abnormalities.  I will refer her to Dr. Britt to evaluate her for normal hydrocephalus management and treatment.  I will see her again in 3 months time for follow-up.

## 2025-02-12 ENCOUNTER — TELEPHONE (OUTPATIENT)
Dept: NEUROLOGY | Facility: CLINIC | Age: 80
End: 2025-02-12
Payer: MEDICARE

## 2025-02-12 NOTE — TELEPHONE ENCOUNTER
Caller: CORBETT,DUANE RAYMOND    Relationship: Emergency Contact    Best call back number: [phone]    What was the call regarding: ZAINAB CALLED DUE TO HE WAS TOLD TO CALL THE OFFICE EVERY WEEK UNTIL THE PT IS SCHEDULED. THEY WANT TO KNOW IF THERE IS ANYTHING THE OFFICE CAN DO TO HELP GET HER SCHEDULED OR ANY CONTACT INFO THAT CAN BE SHARED     PLEASE ADVISE  THANK  YOU

## 2025-02-17 NOTE — TELEPHONE ENCOUNTER
[facility] called to schedule appt for patient, 3/24 @ 10:30am.     [address] [facility]. [place]

## 2025-02-17 NOTE — TELEPHONE ENCOUNTER
I called, the scheduling dept states that there isn't a referral. Fax was confirmed. Resent, she did send message to see if practice has it.

## 2025-05-08 ENCOUNTER — HOSPITAL ENCOUNTER (EMERGENCY)
Facility: HOSPITAL | Age: 80
Discharge: HOME | End: 2025-05-08
Attending: EMERGENCY MEDICINE
Payer: MEDICARE

## 2025-05-08 VITALS
WEIGHT: 99.21 LBS | RESPIRATION RATE: 18 BRPM | HEIGHT: 64 IN | BODY MASS INDEX: 16.94 KG/M2 | SYSTOLIC BLOOD PRESSURE: 111 MMHG | OXYGEN SATURATION: 100 % | DIASTOLIC BLOOD PRESSURE: 72 MMHG | HEART RATE: 72 BPM | TEMPERATURE: 98.1 F

## 2025-05-08 DIAGNOSIS — N39.0 ACUTE UTI: Primary | ICD-10-CM

## 2025-05-08 LAB
BACTERIA UR QL AUTO: ABNORMAL /HPF
BILIRUB UR QL STRIP: NEGATIVE
BILIRUB UR QL STRIP: NEGATIVE
CLARITY UR: ABNORMAL
CLARITY UR: ABNORMAL
COLOR UR: YELLOW
COLOR UR: YELLOW
GLUCOSE UR STRIP-MCNC: NEGATIVE MG/DL
GLUCOSE UR STRIP-MCNC: NEGATIVE MG/DL
HGB UR QL STRIP.AUTO: NEGATIVE
HGB UR QL STRIP.AUTO: NEGATIVE
HYALINE CASTS UR QL AUTO: ABNORMAL /LPF
KETONES UR QL STRIP: ABNORMAL
KETONES UR QL STRIP: ABNORMAL
LEUKOCYTE ESTERASE UR QL STRIP.AUTO: ABNORMAL
LEUKOCYTE ESTERASE UR QL STRIP.AUTO: ABNORMAL
NITRITE UR QL STRIP: NEGATIVE
NITRITE UR QL STRIP: NEGATIVE
PH UR STRIP.AUTO: 6 [PH] (ref 5–8)
PH UR STRIP.AUTO: 6 [PH] (ref 5–8)
PROT UR QL STRIP: ABNORMAL
PROT UR QL STRIP: ABNORMAL
RBC # UR STRIP: ABNORMAL /HPF
REF LAB TEST METHOD: ABNORMAL
SP GR UR STRIP: >1.03 (ref 1–1.03)
SP GR UR STRIP: >1.03 (ref 1–1.03)
SQUAMOUS #/AREA URNS HPF: ABNORMAL /HPF
UROBILINOGEN UR QL STRIP: ABNORMAL
UROBILINOGEN UR QL STRIP: ABNORMAL
WBC # UR STRIP: ABNORMAL /HPF

## 2025-05-08 PROCEDURE — 87086 URINE CULTURE/COLONY COUNT: CPT | Performed by: EMERGENCY MEDICINE

## 2025-05-08 PROCEDURE — 25010000002 CEFTRIAXONE PER 250 MG

## 2025-05-08 PROCEDURE — 96372 THER/PROPH/DIAG INJ SC/IM: CPT

## 2025-05-08 PROCEDURE — 25010000002 LIDOCAINE 1 % SOLUTION 10 ML VIAL

## 2025-05-08 PROCEDURE — 99283 EMERGENCY DEPT VISIT LOW MDM: CPT

## 2025-05-08 PROCEDURE — 81001 URINALYSIS AUTO W/SCOPE: CPT | Performed by: EMERGENCY MEDICINE

## 2025-05-08 RX ORDER — CEPHALEXIN 500 MG/1
500 CAPSULE ORAL 2 TIMES DAILY
Qty: 14 CAPSULE | Refills: 0 | Status: SHIPPED | OUTPATIENT
Start: 2025-05-08 | End: 2025-05-15

## 2025-05-08 RX ADMIN — LIDOCAINE HYDROCHLORIDE 1 G: 10 INJECTION, SOLUTION INFILTRATION; PERINEURAL at 17:40

## 2025-05-08 NOTE — ED PROVIDER NOTES
Time: 4:42 PM EDT  Date of encounter:  5/8/2025  Independent Historian/Clinical History and Information was obtained by:   Patient    History is limited by: N/A    Chief Complaint: dysuria       History of Present Illness:  Patient is a 80 y.o. year old female who presents to the emergency department for evaluation of dysuria that began 2 days ago.  Patient denies flank pain, vomiting, fever.  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)     Hypertension     Lesion of sciatic nerve, bilateral lower limbs     Osteoporosis     Overactive bladder     Piriformis syndrome     Polyp of colon     Postmenopausal atrophic vaginitis     Vaginal atrophy      Past Surgical History:   Procedure Laterality Date    COLONOSCOPY  2019    LEEP N/A 1/6/2022    Procedure: LOOP ELECTROCAUTERY EXCISION PROCEDURE;  Surgeon: El Álvarez MD;  Location: [facility] MAIN OR;  Service: Gynecology;  Laterality: N/A;    TUBAL ABDOMINAL LIGATION       Family History   Problem Relation Age of Onset    Thyroid disease Mother     Heart disease Mother     Heart disease Father     Thyroid disease Sister     Heart disease Sister     Colon cancer Sister        Home Medications:  Prior to Admission medications    Medication Sig Start Date End Date Taking? Authorizing Provider   Calcium Carbonate-Vitamin D 600-200 MG-UNIT capsule Take 1 capsule by mouth Daily.    Provider, MD Nai   estradiol (ESTRACE) 0.1 MG/GM vaginal cream  Insert 1 gram into the vagina 2-3 times a week 1/12/24   Sanjiv Kelly APRN   gabapentin (NEURONTIN) 600 MG tablet Take 1 tablet by mouth 3 (Three) Times a Day. 9/4/21   Nai Clements MD   HYDROcodone-acetaminophen (NORCO) 5-325 MG per tablet Take 1 tablet by mouth Every 8 (Eight) Hours As Needed. 10/11/22   Nai Clements MD   metoprolol succinate XL (TOPROL-XL) 25 MG 24 hr tablet Take 1 tablet by mouth Every 12 (Twelve) Hours for 30 days. 5/13/24 6/12/24  Lamont Prince MD   metoprolol succinate XL (TOPROL-XL) 25 MG 24 hr tablet Take 1 tablet by mouth Daily.    Nai Clements MD   multivitamin with minerals tablet tablet Take 1 tablet by mouth Daily.    Nai Clements MD   phenazopyridine (PYRIDIUM) 200 MG tablet Take 1 tablet by mouth 3 (Three) Times a Day As Needed for Bladder Spasms or Dysuria. 8/18/24   Anushka Valentine APRN        Social History:   Social History     Tobacco Use    Smoking status: Never     Passive exposure: Never    Smokeless tobacco: Never    Tobacco comments:     current non smoker unknown if ever smoked   Vaping Use    Vaping status: Never Used   Substance Use Topics    Alcohol use: Not Currently    Drug use: Not Currently         Review of Systems:  Review of Systems   Constitutional:  Negative for chills and fever.   HENT:  Negative for congestion, rhinorrhea and sore throat.    Eyes:  Negative for pain and visual disturbance.   Respiratory:  Negative for apnea, cough, chest tightness and shortness of breath.    Cardiovascular:  Negative for chest pain and palpitations.   Gastrointestinal:  Negative for abdominal pain, diarrhea, nausea and vomiting.   Genitourinary:  Positive for dysuria. Negative for difficulty urinating.   Musculoskeletal:  Negative for joint swelling and myalgias.   Skin:  Negative for color change.   Neurological:  Negative for seizures and headaches.   Psychiatric/Behavioral: Negative.     All other systems reviewed and are  "negative.       Physical Exam:  /72 (BP Location: Left arm, Patient Position: Sitting)   Pulse 72   Temp 98.1 °F (36.7 °C) (Oral)   Resp 18   Ht 162.6 cm (64\")   Wt 45 kg (99 lb 3.3 oz)   SpO2 100%   BMI 17.03 kg/m²     Physical Exam  Vitals and nursing note reviewed.   Constitutional:       General: She is not in acute distress.     Appearance: Normal appearance. She is not toxic-appearing.   HENT:      Head: Normocephalic and atraumatic.      Jaw: There is normal jaw occlusion.   Eyes:      General: Lids are normal.      Extraocular Movements: Extraocular movements intact.      Conjunctiva/sclera: Conjunctivae normal.      Pupils: Pupils are equal, round, and reactive to light.   Cardiovascular:      Rate and Rhythm: Normal rate and regular rhythm.      Pulses: Normal pulses.      Heart sounds: Normal heart sounds.   Pulmonary:      Effort: Pulmonary effort is normal. No respiratory distress.      Breath sounds: Normal breath sounds. No wheezing or rhonchi.   Abdominal:      General: Abdomen is flat.      Palpations: Abdomen is soft.      Tenderness: There is no abdominal tenderness. There is no guarding or rebound.   Musculoskeletal:         General: Normal range of motion.      Cervical back: Normal range of motion and neck supple.      Right lower leg: No edema.      Left lower leg: No edema.   Skin:     General: Skin is warm and dry.   Neurological:      Mental Status: She is alert and oriented to person, place, and time. Mental status is at baseline.   Psychiatric:         Mood and Affect: Mood normal.                    Medical Decision Making:      Comorbidities that affect care:    Hypertension    External Notes reviewed:          The following orders were placed and all results were independently analyzed by me:  Orders Placed This Encounter   Procedures    Urine Culture - Urine,    Urinalysis With Microscopic If Indicated (No Culture) - Urine, Clean Catch    Urinalysis With Culture If " Indicated - Urine, Clean Catch    Urinalysis, Microscopic Only - Urine, Clean Catch       Medications Given in the Emergency Department:  Medications   cefTRIAXone (ROCEPHIN) 1 g in lidocaine (XYLOCAINE) 1 % IM only syringe (has no administration in time range)        ED Course:         Labs:    Lab Results (last 24 hours)       Procedure Component Value Units Date/Time    Urinalysis With Microscopic If Indicated (No Culture) - Urine, Clean Catch [[id number]]  (Abnormal) Collected: 05/08/25 1648    Specimen: Urine, Clean Catch Updated: 05/08/25 1714     Color, UA Yellow     Appearance, UA Cloudy     pH, UA 6.0     Specific Gravity, UA >1.030     Glucose, UA Negative     Ketones, UA Trace     Bilirubin, UA Negative     Blood, UA Negative     Protein, UA Trace     Leuk Esterase, UA Large (3+)     Nitrite, UA Negative     Urobilinogen, UA 1.0 E.U./dL    Urinalysis With Culture If Indicated - Urine, Clean Catch [[id number]]  (Abnormal) Collected: 05/08/25 1648    Specimen: Urine, Clean Catch Updated: 05/08/25 1714     Color, UA Yellow     Appearance, UA Cloudy     pH, UA 6.0     Specific Gravity, UA >1.030     Glucose, UA Negative     Ketones, UA Trace     Bilirubin, UA Negative     Blood, UA Negative     Protein, UA Trace     Leuk Esterase, UA Large (3+)     Nitrite, UA Negative     Urobilinogen, UA 1.0 E.U./dL    Narrative:      In absence of clinical symptoms, the presence of pyuria, bacteria, and/or nitrites on the urinalysis result does not correlate with infection.    Urinalysis, Microscopic Only - Urine, Clean Catch [[id number]]  (Abnormal) Collected: 05/08/25 1648    Specimen: Urine, Clean Catch Updated: 05/08/25 1714     RBC, UA 0-2 /HPF      WBC, UA Too Numerous to Count /HPF      Bacteria, UA 3+ /HPF      Squamous Epithelial Cells, UA 7-12 /HPF      Hyaline Casts, UA 0-2 /LPF      Methodology Automated Microscopy    Urine Culture - Urine, Urine, Clean Catch [[id number]] Collected: 05/08/25 1648    Specimen:  Urine, Clean Catch Updated: 05/08/25 1714             Imaging:    No Radiology Exams Resulted Within Past 24 Hours      Differential Diagnosis and Discussion:    Dysuria: Differential diagnosis includes but is not limited to urethritis, cystitis, pyelonephritis, ureteral calculi, neoplasm, chemical irritant, urethral stricture, and trauma    PROCEDURES:    Labs were collected in the emergency department and all labs were reviewed and interpreted by me.    No orders to display       Procedures    MDM     Amount and/or Complexity of Data Reviewed  Clinical lab tests: reviewed       Urinalysis shows evidence of UTI.  I gave patient a gram Rocephin in ED will send patient home with Keflex.  I instructed family to bring patient back to ED if he notes any new or worsening symptoms.  Patient and family state they understand and agree with plan of care.                Patient Care Considerations:          Consultants/Shared Management Plan:    None    Social Determinants of Health:    Patient is independent, reliable, and has access to care.       Disposition and Care Coordination:    Discharged: The patient is suitable and stable for discharge with no need for consideration of admission.    I have explained the patient´s condition, diagnoses and treatment plan based on the information available to me at this time. I have answered questions and addressed any concerns. The patient has a good  understanding of the patient´s diagnosis, condition, and treatment plan as can be expected at this point. The vital signs have been stable. The patient´s condition is stable and appropriate for discharge from the emergency department.      The patient will pursue further outpatient evaluation with the primary care physician or other designated or consulting physician as outlined in the discharge instructions. They are agreeable to this plan of care and follow-up instructions have been explained in detail. The patient has received these  instructions in written format and has expressed an understanding of the discharge instructions. The patient is aware that any significant change in condition or worsening of symptoms should prompt an immediate return to this or the closest emergency department or call to 911.  I have explained discharge medications and the need for follow up with the patient/caretakers. This was also printed in the discharge instructions. Patient was discharged with the following medications and follow up:      Medication List        New Prescriptions      cephalexin 500 MG capsule  Commonly known as: KEFLEX  Take 1 capsule by mouth 2 (Two) Times a Day for 7 days.               Where to Get Your Medications        These medications were sent to [id number] - [place], [place] - [address] AT [facility] [phone] [facility] [phone]   [address] [place] [address]      Phone: [phone]   cephalexin 500 MG capsule      Raad Del Toro MD  [address]  [facility] [address]  [phone]    Call in 1 day  To schedule follow-up       Final diagnoses:   Acute UTI        ED Disposition       ED Disposition   Discharge    Condition   Stable    Comment   --               This medical record created using voice recognition software.             Erlin Contreras PA-C  05/08/25 9933

## 2025-05-08 NOTE — ED PROVIDER NOTES
"SHARED VISIT ATTESTATION:    This visit was performed by myself and an APC.  I personally approved the management plan/medical decision making and take responsibility for the patient management.      SHARED VISIT NOTE:    Patient is 80 y.o. year old female that presents to the ED for evaluation of dysuria that began 2 days ago.       ED Course:    /72 (BP Location: Left arm, Patient Position: Sitting)   Pulse 72   Temp 98.1 °F (36.7 °C) (Oral)   Resp 18   Ht 162.6 cm (64\")   Wt 45 kg (99 lb 3.3 oz)   SpO2 100%   BMI 17.03 kg/m²   Results for orders placed or performed during the hospital encounter of 05/08/25   Urinalysis With Microscopic If Indicated (No Culture) - Urine, Clean Catch    Collection Time: 05/08/25  4:48 PM    Specimen: Urine, Clean Catch   Result Value Ref Range    Color, UA Yellow Yellow, Straw    Appearance, UA Cloudy (A) Clear    pH, UA 6.0 5.0 - 8.0    Specific Gravity, UA >1.030 (H) 1.005 - 1.030    Glucose, UA Negative Negative    Ketones, UA Trace (A) Negative    Bilirubin, UA Negative Negative    Blood, UA Negative Negative    Protein, UA Trace (A) Negative    Leuk Esterase, UA Large (3+) (A) Negative    Nitrite, UA Negative Negative    Urobilinogen, UA 1.0 E.U./dL 0.2 - 1.0 E.U./dL   Urinalysis With Culture If Indicated - Urine, Clean Catch    Collection Time: 05/08/25  4:48 PM    Specimen: Urine, Clean Catch   Result Value Ref Range    Color, UA Yellow Yellow, Straw    Appearance, UA Cloudy (A) Clear    pH, UA 6.0 5.0 - 8.0    Specific Gravity, UA >1.030 (H) 1.005 - 1.030    Glucose, UA Negative Negative    Ketones, UA Trace (A) Negative    Bilirubin, UA Negative Negative    Blood, UA Negative Negative    Protein, UA Trace (A) Negative    Leuk Esterase, UA Large (3+) (A) Negative    Nitrite, UA Negative Negative    Urobilinogen, UA 1.0 E.U./dL 0.2 - 1.0 E.U./dL   Urinalysis, Microscopic Only - Urine, Clean Catch    Collection Time: 05/08/25  4:48 PM    Specimen: Urine, Clean " Catch   Result Value Ref Range    RBC, UA 0-2 None Seen, 0-2 /HPF    WBC, UA Too Numerous to Count (A) None Seen, 0-2 /HPF    Bacteria, UA 3+ (A) None Seen /HPF    Squamous Epithelial Cells, UA 7-12 (A) None Seen, 0-2 /HPF    Hyaline Casts, UA 0-2 None Seen /LPF    Methodology Automated Microscopy      Medications   cefTRIAXone (ROCEPHIN) 1 g in lidocaine (XYLOCAINE) 1 % IM only syringe (has no administration in time range)     No results found.    MDM:    Procedures                         Gustavo Abdullahi DO  17:38 EDT  05/08/25         Gustavo Abdullahi,   05/08/25 1738

## 2025-05-09 LAB — BACTERIA SPEC AEROBE CULT: NORMAL

## 2025-05-14 ENCOUNTER — LAB (OUTPATIENT)
Dept: LAB | Facility: HOSPITAL | Age: 80
End: 2025-05-14
Payer: MEDICARE

## 2025-05-14 ENCOUNTER — TRANSCRIBE ORDERS (OUTPATIENT)
Dept: LAB | Facility: HOSPITAL | Age: 80
End: 2025-05-14
Payer: MEDICARE

## 2025-05-14 DIAGNOSIS — M81.0 OSTEOPOROSIS, POST-MENOPAUSAL: ICD-10-CM

## 2025-05-14 DIAGNOSIS — M81.0 OSTEOPOROSIS, POST-MENOPAUSAL: Primary | ICD-10-CM

## 2025-05-14 DIAGNOSIS — Z79.899 ENCOUNTER FOR LONG-TERM (CURRENT) USE OF MEDICATIONS: ICD-10-CM

## 2025-05-14 LAB
25(OH)D3 SERPL-MCNC: 68.3 NG/ML (ref 30–100)
ALBUMIN SERPL-MCNC: 4.4 G/DL (ref 3.5–5.2)
ALBUMIN/GLOB SERPL: 1.8 G/DL
ALP SERPL-CCNC: 96 U/L (ref 39–117)
ALT SERPL W P-5'-P-CCNC: 12 U/L (ref 1–33)
ANION GAP SERPL CALCULATED.3IONS-SCNC: 9.1 MMOL/L (ref 5–15)
AST SERPL-CCNC: 32 U/L (ref 1–32)
BILIRUB SERPL-MCNC: 0.2 MG/DL (ref 0–1.2)
BUN SERPL-MCNC: 27 MG/DL (ref 8–23)
BUN/CREAT SERPL: 26 (ref 7–25)
CALCIUM SPEC-SCNC: 10.1 MG/DL (ref 8.6–10.5)
CHLORIDE SERPL-SCNC: 105 MMOL/L (ref 98–107)
CO2 SERPL-SCNC: 25.9 MMOL/L (ref 22–29)
CREAT SERPL-MCNC: 1.04 MG/DL (ref 0.57–1)
EGFRCR SERPLBLD CKD-EPI 2021: 55.1 ML/MIN/1.73
GLOBULIN UR ELPH-MCNC: 2.5 GM/DL
GLUCOSE SERPL-MCNC: 127 MG/DL (ref 65–99)
MAGNESIUM SERPL-MCNC: 2.3 MG/DL (ref 1.6–2.4)
POTASSIUM SERPL-SCNC: 4.9 MMOL/L (ref 3.5–5.2)
PROT SERPL-MCNC: 6.9 G/DL (ref 6–8.5)
SODIUM SERPL-SCNC: 140 MMOL/L (ref 136–145)

## 2025-05-14 PROCEDURE — 80053 COMPREHEN METABOLIC PANEL: CPT

## 2025-05-14 PROCEDURE — 36415 COLL VENOUS BLD VENIPUNCTURE: CPT

## 2025-05-14 PROCEDURE — 82306 VITAMIN D 25 HYDROXY: CPT

## 2025-05-14 PROCEDURE — 83735 ASSAY OF MAGNESIUM: CPT

## 2025-05-19 ENCOUNTER — TRANSCRIBE ORDERS (OUTPATIENT)
Dept: ADMINISTRATIVE | Facility: HOSPITAL | Age: 80
End: 2025-05-19
Payer: MEDICARE

## 2025-05-19 DIAGNOSIS — M81.0 OSTEOPOROSIS, POST-MENOPAUSAL: Primary | ICD-10-CM

## 2025-05-20 DIAGNOSIS — M81.0 OSTEOPOROSIS, POST-MENOPAUSAL: Primary | ICD-10-CM

## 2025-05-25 ENCOUNTER — HOSPITAL ENCOUNTER (EMERGENCY)
Facility: HOSPITAL | Age: 80
Discharge: HOME | End: 2025-05-25
Attending: EMERGENCY MEDICINE | Admitting: EMERGENCY MEDICINE
Payer: MEDICARE

## 2025-05-25 VITALS
TEMPERATURE: 97.9 F | SYSTOLIC BLOOD PRESSURE: 117 MMHG | RESPIRATION RATE: 17 BRPM | WEIGHT: 96.56 LBS | HEART RATE: 66 BPM | BODY MASS INDEX: 16.49 KG/M2 | HEIGHT: 64 IN | OXYGEN SATURATION: 100 % | DIASTOLIC BLOOD PRESSURE: 69 MMHG

## 2025-05-25 DIAGNOSIS — B37.31 VAGINAL CANDIDIASIS: Primary | ICD-10-CM

## 2025-05-25 LAB
BACTERIA UR QL AUTO: ABNORMAL /HPF
BILIRUB UR QL STRIP: NEGATIVE
CLARITY UR: CLEAR
COLOR UR: YELLOW
GLUCOSE UR STRIP-MCNC: NEGATIVE MG/DL
HGB UR QL STRIP.AUTO: NEGATIVE
HYALINE CASTS UR QL AUTO: ABNORMAL /LPF
KETONES UR QL STRIP: ABNORMAL
LEUKOCYTE ESTERASE UR QL STRIP.AUTO: ABNORMAL
NITRITE UR QL STRIP: NEGATIVE
PH UR STRIP.AUTO: 6 [PH] (ref 5–8)
PROT UR QL STRIP: NEGATIVE
RBC # UR STRIP: ABNORMAL /HPF
REF LAB TEST METHOD: ABNORMAL
SP GR UR STRIP: 1.03 (ref 1–1.03)
SQUAMOUS #/AREA URNS HPF: ABNORMAL /HPF
UROBILINOGEN UR QL STRIP: ABNORMAL
WBC # UR STRIP: ABNORMAL /HPF

## 2025-05-25 PROCEDURE — P9612 CATHETERIZE FOR URINE SPEC: HCPCS

## 2025-05-25 PROCEDURE — 99284 EMERGENCY DEPT VISIT MOD MDM: CPT

## 2025-05-25 PROCEDURE — 81001 URINALYSIS AUTO W/SCOPE: CPT | Performed by: EMERGENCY MEDICINE

## 2025-05-25 PROCEDURE — 87086 URINE CULTURE/COLONY COUNT: CPT | Performed by: EMERGENCY MEDICINE

## 2025-05-25 RX ORDER — FLUCONAZOLE 150 MG/1
150 TABLET ORAL ONCE
Qty: 1 TABLET | Refills: 0 | Status: SHIPPED | OUTPATIENT
Start: 2025-05-25 | End: 2025-05-25

## 2025-05-25 RX ORDER — MICONAZOLE NITRATE 2 %
1 CREAM WITH APPLICATOR VAGINAL NIGHTLY
Qty: 15 G | Refills: 0 | Status: SHIPPED | OUTPATIENT
Start: 2025-05-25

## 2025-05-25 RX ORDER — MICONAZOLE NITRATE 2 %
1 CREAM WITH APPLICATOR VAGINAL NIGHTLY
Qty: 15 G | Refills: 0 | Status: SHIPPED | OUTPATIENT
Start: 2025-05-25 | End: 2025-05-25

## 2025-05-25 NOTE — DISCHARGE INSTRUCTIONS
Take medication as directed.  Use vaginal ointment as directed.  Return for worsening symptoms.  Follow-up with doctor 1 to 2 days

## 2025-05-25 NOTE — ED PROVIDER NOTES
Time: 2:14 PM EDT  Date of encounter:  5/25/2025  Independent Historian/Clinical History and Information was obtained by:   Patient    History is limited by: N/A    Chief Complaint: Vaginal pain.      History of Present Illness:  Patient is a 80 y.o. year old female who presents to the emergency department for evaluation of vaginal pain.  The patient presents to the emergency room with vaginal pain after she was recently treated for UTI.  She has had no fever or chills or trauma.  She has had no abdominal pain.  She has had no vaginal discharge      Patient Care Team  Primary Care Provider: Raad Del Toro MD    Past Medical History:     Allergies   Allergen Reactions    Sulfa Antibiotics Anaphylaxis    Nitrofurantoin Confusion     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)     Hypertension     Lesion of sciatic nerve, bilateral lower limbs     Osteoporosis     Overactive bladder     Piriformis syndrome     Polyp of colon     Postmenopausal atrophic vaginitis     Vaginal atrophy      Past Surgical History:   Procedure Laterality Date    COLONOSCOPY  2019    LEEP N/A 1/6/2022    Procedure: LOOP ELECTROCAUTERY EXCISION PROCEDURE;  Surgeon: El Álvarez MD;  Location: [facility] MAIN OR;  Service: Gynecology;  Laterality: N/A;    TUBAL ABDOMINAL LIGATION       Family History   Problem Relation Age of Onset    Thyroid disease Mother     Heart disease Mother     Heart disease Father     Thyroid disease Sister     Heart disease Sister     Colon cancer Sister        Home Medications:  Prior to Admission medications    Medication Sig Start Date End Date Taking? Authorizing Provider   Calcium Carbonate-Vitamin D 600-200  MG-UNIT capsule Take 1 capsule by mouth Daily.    Nai Clements MD   estradiol (ESTRACE) 0.1 MG/GM vaginal cream Insert 1 gram into the vagina 2-3 times a week 1/12/24   Sanjiv Kelly APRN   gabapentin (NEURONTIN) 600 MG tablet Take 1 tablet by mouth 3 (Three) Times a Day. 9/4/21   Nai Clements MD   HYDROcodone-acetaminophen (NORCO) 5-325 MG per tablet Take 1 tablet by mouth Every 8 (Eight) Hours As Needed. 10/11/22   Nai Clements MD   metoprolol succinate XL (TOPROL-XL) 25 MG 24 hr tablet Take 1 tablet by mouth Every 12 (Twelve) Hours for 30 days. 5/13/24 6/12/24  Lamont Prince MD   metoprolol succinate XL (TOPROL-XL) 25 MG 24 hr tablet Take 1 tablet by mouth Daily.    Nai Clements MD   multivitamin with minerals tablet tablet Take 1 tablet by mouth Daily.    Nai Clements MD   phenazopyridine (PYRIDIUM) 200 MG tablet Take 1 tablet by mouth 3 (Three) Times a Day As Needed for Bladder Spasms or Dysuria. 8/18/24   Anushka Valentine APRN        Social History:   Social History     Tobacco Use    Smoking status: Never     Passive exposure: Never    Smokeless tobacco: Never    Tobacco comments:     current non smoker unknown if ever smoked   Vaping Use    Vaping status: Never Used   Substance Use Topics    Alcohol use: Not Currently    Drug use: Not Currently         Review of Systems:  Review of Systems   Constitutional:  Negative for chills and fever.   HENT:  Negative for congestion, ear pain and sore throat.    Eyes:  Negative for pain.   Respiratory:  Negative for cough, chest tightness and shortness of breath.    Cardiovascular:  Negative for chest pain.   Gastrointestinal:  Negative for abdominal pain, diarrhea, nausea and vomiting.   Genitourinary:  Positive for dysuria and vaginal pain. Negative for flank pain and hematuria.   Musculoskeletal:  Negative for joint swelling.   Skin:  Negative for pallor.   Neurological:  Negative for seizures and headaches.  "  All other systems reviewed and are negative.       Physical Exam:  /69 (BP Location: Right arm, Patient Position: Sitting)   Pulse 66   Temp 97.9 °F (36.6 °C) (Oral)   Resp 17   Ht 162.6 cm (64\")   Wt 43.8 kg (96 lb 9 oz)   SpO2 100%   BMI 16.57 kg/m²     Physical Exam  Vitals and nursing note reviewed.   Constitutional:       General: She is not in acute distress.     Appearance: Normal appearance. She is not toxic-appearing.   HENT:      Head: Normocephalic and atraumatic.      Mouth/Throat:      Mouth: Mucous membranes are moist.   Eyes:      General: No scleral icterus.  Cardiovascular:      Rate and Rhythm: Normal rate and regular rhythm.      Pulses: Normal pulses.      Heart sounds: Normal heart sounds.   Pulmonary:      Effort: Pulmonary effort is normal. No respiratory distress.      Breath sounds: Normal breath sounds.   Abdominal:      General: Abdomen is flat.      Palpations: Abdomen is soft.      Tenderness: There is no abdominal tenderness.   Genitourinary:     Comments: There is some mild erythema of the external vulva suggestive of candidiasis.  There is no discharge or bleeding noted  Musculoskeletal:         General: Normal range of motion.      Cervical back: Normal range of motion and neck supple.   Skin:     General: Skin is warm and dry.   Neurological:      Mental Status: She is alert and oriented to person, place, and time. Mental status is at baseline.                    Medical Decision Making:      Comorbidities that affect care:    Prior UTI    External Notes reviewed:    Previous Clinic Note: Office visit with Dr. Del Toro      The following orders were placed and all results were independently analyzed by me:  Orders Placed This Encounter   Procedures    Urine Culture - Urine,    Urinalysis With Culture If Indicated - Straight Cath    Urinalysis, Microscopic Only - Urine, Clean Catch    Supplies To Bedside - Notify MD When Ready- Pelvic Cart / Set Up       Medications Given " in the Emergency Department:  Medications - No data to display     ED Course:         Labs:    Lab Results (last 24 hours)       Procedure Component Value Units Date/Time    Urinalysis With Culture If Indicated - Straight Cath [[id number]]  (Abnormal) Collected: 05/25/25 1433    Specimen: Urine from Straight Cath Updated: 05/25/25 1457     Color, UA Yellow     Appearance, UA Clear     pH, UA 6.0     Specific Gravity, UA 1.029     Glucose, UA Negative     Ketones, UA Trace     Bilirubin, UA Negative     Blood, UA Negative     Protein, UA Negative     Leuk Esterase, UA Trace     Nitrite, UA Negative     Urobilinogen, UA 1.0 E.U./dL    Narrative:      In absence of clinical symptoms, the presence of pyuria, bacteria, and/or nitrites on the urinalysis result does not correlate with infection.    Urinalysis, Microscopic Only - Straight Cath [[id number]]  (Abnormal) Collected: 05/25/25 1433    Specimen: Urine from Straight Cath Updated: 05/25/25 1457     RBC, UA 0-2 /HPF      WBC, UA 3-5 /HPF      Bacteria, UA None Seen /HPF      Squamous Epithelial Cells, UA 0-2 /HPF      Hyaline Casts, UA 0-2 /LPF      Methodology Manual Light Microscopy    Urine Culture - Urine, Straight Cath [[id number]] Collected: 05/25/25 1433    Specimen: Urine from Straight Cath Updated: 05/25/25 1440             Imaging:    No Radiology Exams Resulted Within Past 24 Hours      Differential Diagnosis and Discussion:    Dysuria: Differential diagnosis includes but is not limited to urethritis, cystitis, pyelonephritis, ureteral calculi, neoplasm, chemical irritant, urethral stricture, and trauma    PROCEDURES:    Labs were collected in the emergency department and all labs were reviewed and interpreted by me.    No orders to display       Procedures    MDM     Amount and/or Complexity of Data Reviewed  Clinical lab tests: reviewed                     Patient Care Considerations:    CT ABDOMEN AND PELVIS: I considered ordering a CT scan of the abdomen  and pelvis however patient has no abdominal pain or tenderness      Consultants/Shared Management Plan:    None    Social Determinants of Health:    Patient has presented with family members who are responsible, reliable and will ensure follow up care.      Disposition and Care Coordination:    Discharged: The patient is suitable and stable for discharge with no need for consideration of admission.    I have explained discharge medications and the need for follow up with the patient/caretakers. This was also printed in the discharge instructions. Patient was discharged with the following medications and follow up:      Medication List        New Prescriptions      fluconazole 150 MG tablet  Commonly known as: DIFLUCAN  Take 1 tablet by mouth 1 (One) Time for 1 dose.     miconazole 2 % vaginal cream  Commonly known as: MICOTIN  Insert 1 applicator into the vagina Every Night.               Where to Get Your Medications        These medications were sent to [facility]/pharmacy #[id number] - [place] - [address] - [phone]  - [phone]  [address] [place] [address]      Hours: 24-hours Phone: [phone]   fluconazole 150 MG tablet  miconazole 2 % vaginal cream      Raad Del Toro MD  [address]  [address]  [place] [address]  [phone]    In 2 days         Final diagnoses:   Vaginal candidiasis        ED Disposition       ED Disposition   Discharge    Condition   Stable    Comment   --               This medical record created using voice recognition software.             Gustavo Abdullahi DO  05/25/25 9423

## 2025-05-26 LAB — BACTERIA SPEC AEROBE CULT: NO GROWTH

## 2025-05-29 ENCOUNTER — HOSPITAL ENCOUNTER (OUTPATIENT)
Dept: INFUSION THERAPY | Facility: HOSPITAL | Age: 80
Discharge: HOME | End: 2025-05-29
Payer: MEDICARE

## 2025-05-29 VITALS
RESPIRATION RATE: 20 BRPM | OXYGEN SATURATION: 97 % | HEART RATE: 71 BPM | SYSTOLIC BLOOD PRESSURE: 103 MMHG | TEMPERATURE: 98.5 F | DIASTOLIC BLOOD PRESSURE: 57 MMHG

## 2025-05-29 DIAGNOSIS — M81.0 OSTEOPOROSIS, POST-MENOPAUSAL: Primary | ICD-10-CM

## 2025-05-29 PROCEDURE — 96372 THER/PROPH/DIAG INJ SC/IM: CPT

## 2025-05-29 PROCEDURE — 25010000002 DENOSUMAB 60 MG/ML SOLUTION PREFILLED SYRINGE

## 2025-05-29 RX ADMIN — DENOSUMAB 60 MG: 60 INJECTION SUBCUTANEOUS at 14:29

## 2025-06-16 ENCOUNTER — OFFICE VISIT (OUTPATIENT)
Dept: NEUROLOGY | Facility: CLINIC | Age: 80
End: 2025-06-16
Payer: MEDICARE

## 2025-06-16 VITALS
DIASTOLIC BLOOD PRESSURE: 68 MMHG | BODY MASS INDEX: 16.1 KG/M2 | SYSTOLIC BLOOD PRESSURE: 120 MMHG | HEIGHT: 64 IN | WEIGHT: 94.3 LBS | HEART RATE: 65 BPM

## 2025-06-16 DIAGNOSIS — R26.9 GAIT ABNORMALITY: Primary | ICD-10-CM

## 2025-06-16 PROBLEM — G91.2 NORMAL PRESSURE HYDROCEPHALUS: Status: RESOLVED | Noted: 2025-02-06 | Resolved: 2025-06-16

## 2025-06-16 PROCEDURE — 1160F RVW MEDS BY RX/DR IN RCRD: CPT | Performed by: PSYCHIATRY & NEUROLOGY

## 2025-06-16 PROCEDURE — 3078F DIAST BP <80 MM HG: CPT | Performed by: PSYCHIATRY & NEUROLOGY

## 2025-06-16 PROCEDURE — 99214 OFFICE O/P EST MOD 30 MIN: CPT | Performed by: PSYCHIATRY & NEUROLOGY

## 2025-06-16 PROCEDURE — 3074F SYST BP LT 130 MM HG: CPT | Performed by: PSYCHIATRY & NEUROLOGY

## 2025-06-16 PROCEDURE — 1159F MED LIST DOCD IN RCRD: CPT | Performed by: PSYCHIATRY & NEUROLOGY

## 2025-06-16 NOTE — PROGRESS NOTES
"Chief Complaint  Neurologic Problem    Subjective          Devi Bobo is a 80 y.o. female who presents to [facility]  History of Present Illness      History of Present Illness  The patient presents for evaluation of progressive weakness in her lower extremities.    She was unable to complete the lumbar puncture under fluoroscopy due to difficulty remaining still, which was attributed to the positioning of her face during the procedure. The radiologist suggested consulting with the physician about the possibility of using anesthesia. However, a nurse later informed her that anesthesia would not be administered. She was told that she would be put in a wheelchair and sent home after the procedure. She has expressed a desire to attempt the test again.    She was initially seen in 04/2024 for progressive weakness in her lower extremities. At that time, she was admitted to the hospital for weakness and urinary frequency, which had been progressively worsening. She had also experienced a fall, leading to a suspected diagnosis of Parkinson's disease. Additionally, she was noted to have cognitive decline and forgetfulness, although these symptoms have since improved. She is now able to engage in conversation and express concern for others. Her mobility within the home is assisted by her son.    INTERVAL: Since last visit, she has not completed the test due to difficulty remaining still during the procedure.    MEDICATIONS  PREVIOUS MEDS:  Cymbalta      Objective   Vital Signs:   /68   Pulse 65   Ht 162.6 cm (64.02\")   Wt 42.8 kg (94 lb 4.8 oz)   BMI 16.18 kg/m²     Physical Exam   Alert, difficulty in following instructions, no tremors noted.  There is rigidity in left arm greater than the right arm.  She has significant difficulty in tapping her left foot compared to her right foot.  Station and gait she is unable to send up without her son lifting her up and she " cannot  her feet.     Results           Assessment and Plan  Diagnoses and all orders for this visit:    1. Gait abnormality (Primary)  Assessment & Plan:  Discussed with them that I will do a DaTscan first to see if it is positive.  If it is negative I will refer her to neurosurgery at [facility] otherwise I will follow her up in this clinic for Parkinson syndrome she the DaTscan be positive.    Orders:  -     NM Brain DaTScan; Future         Assessment & Plan  1.  Parkinson syndrome  She was initially seen in April 2024 for progressive weakness in her lower extremities. At that time, she was admitted to the hospital for weakness and urinary frequency, which had been progressively worsening. She had also experienced a fall, leading to a suspected diagnosis of Parkinson's disease. Additionally, she was noted to have cognitive decline and forgetfulness, although these symptoms have since improved. She is now able to engage in conversation and express concern for others. Her mobility within the home is assisted by her son. A DaTscan will be scheduled to evaluate for potential parkinsonism. If the DaTscan yields a positive result, treatment for parkinsonism will be initiated. If the DaTscan is negative, a referral to a specialist in [place] will be made for further evaluation and potential lumbar puncture.      Total time spent with the patient and coordinating patient care was 30 minutes.    Follow Up  No follow-ups on file.  Patient was given instructions and counseling regarding her condition or for health maintenance advice. Please see specific information pulled into the AVS if appropriate.     Patient or patient representative verbalized consent for the use of Ambient Listening during the visit with  Aroldo Ro MD for chart documentation. 6/16/2025  14:01 EDT

## 2025-06-16 NOTE — ASSESSMENT & PLAN NOTE
Discussed with them that I will do a DaTscan first to see if it is positive.  If it is negative I will refer her to neurosurgery at [facility] otherwise I will follow her up in this clinic for Parkinson syndrome she the DaTscan be positive.

## 2025-06-20 ENCOUNTER — HOSPITAL ENCOUNTER (EMERGENCY)
Facility: HOSPITAL | Age: 80
Discharge: HOME | End: 2025-06-20
Attending: EMERGENCY MEDICINE
Payer: MEDICARE

## 2025-06-20 VITALS
HEIGHT: 64 IN | BODY MASS INDEX: 16.11 KG/M2 | TEMPERATURE: 98.8 F | WEIGHT: 94.36 LBS | DIASTOLIC BLOOD PRESSURE: 65 MMHG | HEART RATE: 72 BPM | SYSTOLIC BLOOD PRESSURE: 122 MMHG | RESPIRATION RATE: 16 BRPM | OXYGEN SATURATION: 96 %

## 2025-06-20 DIAGNOSIS — N76.1 SUBACUTE VAGINITIS: Primary | ICD-10-CM

## 2025-06-20 LAB
BACTERIA UR QL AUTO: ABNORMAL /HPF
BACTERIAL VAGINOSIS VAG-IMP: NEGATIVE
BILIRUB UR QL STRIP: NEGATIVE
C TRACH DNA SPEC QL NAA+PROBE: NOT DETECTED
CANDIDA DNA VAG QL NAA+PROBE: NOT DETECTED
CANDIDA DNA VAG QL NAA+PROBE: NOT DETECTED
CLARITY UR: CLEAR
COLOR UR: YELLOW
GLUCOSE UR STRIP-MCNC: NEGATIVE MG/DL
HGB UR QL STRIP.AUTO: NEGATIVE
HYALINE CASTS UR QL AUTO: ABNORMAL /LPF
KETONES UR QL STRIP: ABNORMAL
LEUKOCYTE ESTERASE UR QL STRIP.AUTO: ABNORMAL
N GONORRHOEA RRNA SPEC QL NAA+PROBE: NOT DETECTED
NITRITE UR QL STRIP: NEGATIVE
PH UR STRIP.AUTO: 6 [PH] (ref 5–8)
PROT UR QL STRIP: NEGATIVE
RBC # UR STRIP: ABNORMAL /HPF
REF LAB TEST METHOD: ABNORMAL
SP GR UR STRIP: 1.03 (ref 1–1.03)
SQUAMOUS #/AREA URNS HPF: ABNORMAL /HPF
T VAGINALIS DNA VAG QL NAA+PROBE: NOT DETECTED
UROBILINOGEN UR QL STRIP: ABNORMAL
WBC # UR STRIP: ABNORMAL /HPF

## 2025-06-20 PROCEDURE — 87491 CHLMYD TRACH DNA AMP PROBE: CPT

## 2025-06-20 PROCEDURE — 81001 URINALYSIS AUTO W/SCOPE: CPT

## 2025-06-20 PROCEDURE — 81515 NFCT DS BV&VAGINITIS DNA ALG: CPT

## 2025-06-20 PROCEDURE — 99284 EMERGENCY DEPT VISIT MOD MDM: CPT

## 2025-06-20 PROCEDURE — P9612 CATHETERIZE FOR URINE SPEC: HCPCS

## 2025-06-20 PROCEDURE — 87591 N.GONORRHOEAE DNA AMP PROB: CPT

## 2025-06-20 NOTE — ED PROVIDER NOTES
Time: 7:50 PM EDT  Date of encounter:  6/20/2025  Independent Historian/Clinical History and Information was obtained by:   Patient and Family    History is limited by: N/A    Chief Complaint: Vaginal pain      History of Present Illness:  Patient is a 80 y.o. year old female who presents to the emergency department for evaluation of vaginal pain.  Patient states that she has had pain in her vagina for the last several months.  She denies any bleeding or discharge.  States that she has been taking fluconazole as prescribed by her PCP and she does not think it is any better.  She also has been using estrogen cream for atrophic vaginitis, states that it made it hurt worse and her PCP told her to cut down on the cream so she has.  Patient denies any dysuria or hematuria, denies abdominal pain nausea or vomiting.  She denies fevers.  She denies rashes or lesions.      Patient Care Team  Primary Care Provider: Raad Del Toro MD    Past Medical History:     Allergies   Allergen Reactions    Sulfa Antibiotics Anaphylaxis    Nitrofurantoin Confusion     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)     Hypertension     Lesion of sciatic nerve, bilateral lower limbs     Osteoporosis     Overactive bladder     Piriformis syndrome     Polyp of colon     Postmenopausal atrophic vaginitis     Vaginal atrophy      Past Surgical History:   Procedure Laterality Date    COLONOSCOPY  2019    LEEP N/A 1/6/2022    Procedure: LOOP ELECTROCAUTERY EXCISION PROCEDURE;  Surgeon: El Álvarez MD;  Location: [facility] MAIN OR;  Service: Gynecology;  Laterality: N/A;    TUBAL ABDOMINAL LIGATION       Family History   Problem Relation Age  of Onset    Thyroid disease Mother     Heart disease Mother     Heart disease Father     Thyroid disease Sister     Heart disease Sister     Colon cancer Sister        Home Medications:  Prior to Admission medications    Medication Sig Start Date End Date Taking? Authorizing Provider   Calcium Carbonate-Vitamin D 600-200 MG-UNIT capsule Take 1 capsule by mouth Daily.    Nai Clements MD   estradiol (ESTRACE) 0.1 MG/GM vaginal cream Insert 1 gram into the vagina 2-3 times a week 1/12/24   Sanjiv Kelly APRN   gabapentin (NEURONTIN) 600 MG tablet Take 1 tablet by mouth 3 (Three) Times a Day. 9/4/21   Nai Clements MD   HYDROcodone-acetaminophen (NORCO) 5-325 MG per tablet Take 1 tablet by mouth Every 8 (Eight) Hours As Needed. 10/11/22   Nai Clements MD   metoprolol succinate XL (TOPROL-XL) 25 MG 24 hr tablet Take 1 tablet by mouth Every 12 (Twelve) Hours for 30 days. 5/13/24 6/16/25  Lamont Prince MD   miconazole (MICOTIN) 2 % vaginal cream Insert 1 applicator into the vagina Every Night. 5/25/25   Gustavo Abdullahi, DO   multivitamin with minerals tablet tablet Take 1 tablet by mouth Daily.    ProviderNai MD   phenazopyridine (PYRIDIUM) 200 MG tablet Take 1 tablet by mouth 3 (Three) Times a Day As Needed for Bladder Spasms or Dysuria. 8/18/24   Anushka Valentine APRN        Social History:   Social History     Tobacco Use    Smoking status: Never     Passive exposure: Never    Smokeless tobacco: Never    Tobacco comments:     current non smoker unknown if ever smoked   Vaping Use    Vaping status: Never Used   Substance Use Topics    Alcohol use: Not Currently    Drug use: Not Currently         Review of Systems:  Review of Systems   Constitutional:  Negative for activity change, appetite change, fatigue and fever.   HENT:  Negative for congestion, ear pain and sore throat.    Eyes:  Negative for photophobia and visual disturbance.   Respiratory:  Negative for cough,  "shortness of breath and wheezing.    Cardiovascular:  Negative for chest pain and palpitations.   Gastrointestinal:  Negative for abdominal pain, diarrhea, nausea and vomiting.   Endocrine: Negative for polyuria.   Genitourinary:  Positive for vaginal pain. Negative for difficulty urinating, dysuria, hematuria, vaginal bleeding and vaginal discharge.   Musculoskeletal:  Negative for arthralgias, back pain, neck pain and neck stiffness.   Skin:  Negative for color change, pallor and rash.   Allergic/Immunologic: Negative for immunocompromised state.   Neurological:  Negative for dizziness and headaches.   Hematological:  Does not bruise/bleed easily.   Psychiatric/Behavioral:  Negative for agitation and confusion.         Physical Exam:  /73   Pulse 85   Temp 98.8 °F (37.1 °C) (Oral)   Resp 14   Ht 162.6 cm (64\")   Wt 42.8 kg (94 lb 5.7 oz)   SpO2 100%   BMI 16.20 kg/m²     Physical Exam  Vitals and nursing note reviewed. Exam conducted with a chaperone present.   Constitutional:       General: She is not in acute distress.     Appearance: She is not ill-appearing, toxic-appearing or diaphoretic.   HENT:      Head: Normocephalic.      Mouth/Throat:      Mouth: Mucous membranes are moist.   Eyes:      Pupils: Pupils are equal, round, and reactive to light.   Cardiovascular:      Rate and Rhythm: Normal rate and regular rhythm.      Pulses: Normal pulses.      Heart sounds: Normal heart sounds.   Pulmonary:      Effort: Pulmonary effort is normal.      Breath sounds: Normal breath sounds.   Abdominal:      General: There is no distension.      Palpations: Abdomen is soft.      Tenderness: There is no abdominal tenderness. There is no guarding or rebound.   Genitourinary:     Comments: There is some erythema visualized to the inner and outer labia.  There is no visible lacerations/abrasions/abnormal lesions.  There is tenderness with palpation of the inner vaginal vault without any significant abnormality " visualized.  Musculoskeletal:      Cervical back: Neck supple.   Skin:     General: Skin is warm and dry.   Neurological:      General: No focal deficit present.      Mental Status: She is alert and oriented to person, place, and time.   Psychiatric:         Mood and Affect: Mood normal.         Behavior: Behavior normal.                    Medical Decision Making:      Comorbidities that affect care:    Atrophic vaginitis, HTN, pulmonary fibrosis, anxiety, malnutrition    External Notes reviewed:    Previous Clinic Note: Seen in 6/2 by PCP for vaginal pain and started on fluconazole every 3 days for 12 days and Previous ED Note: Seen in ED 5/25/2025 for vaginal candidiasis      The following orders were placed and all results were independently analyzed by me:  No orders of the defined types were placed in this encounter.      Medications Given in the Emergency Department:  Medications - No data to display     ED Course:    ED Course as of 06/20/25 2148 Fri Jun 20, 2025 2144 WBC, UA(!): 21-50 [AS]      ED Course User Index  [AS] Sue Levi, SAURABH       Labs:    Lab Results (last 24 hours)       ** No results found for the last 24 hours. **             Imaging:    No Radiology Exams Resulted Within Past 24 Hours      Differential Diagnosis and Discussion:    Vaginal Discharge: Differential diagnosis includes but is not limited to bacterial vaginosis, candidiasis, trichomonas vaginitis, cervicitis, rectovaginal fistula, irritants and allergens, foreign body, pelvic inflammatory disease.    PROCEDURES:    Labs were collected in the emergency department and all labs were reviewed and interpreted by me.    No orders to display       Procedures    MDM  Number of Diagnoses or Management Options  Subacute vaginitis  Diagnosis management comments: This is a 80-year-old female who was previously diagnosed with vaginal candidiasis and atrophic vaginitis presenting to the emergency department with continued vaginal  pain and irritation.  Has previously been treated with fluconazole by PCP.  She denies fevers chills vaginal bleeding vaginal discharge or abdominal pain.  On evaluation I do appreciate some erythema to the inner and outer labia without any notable discharge.  There is some mild pain with palpation of the vaginal vault.  Vaginosis swab obtained is negative.  Urinalysis shows white blood cells without bacteria negative nitrite.  She denies dysuria.  I discussed that patient probably needs to follow-up with an OB/GYN.  Patient would like a referral.  Patient is appropriate for discharge.  Referral to OB/GYN sent.       Amount and/or Complexity of Data Reviewed  Clinical lab tests: reviewed  Obtain history from someone other than the patient: yes  Review and summarize past medical records: yes  Discuss the patient with other providers: yes    Risk of Complications, Morbidity, and/or Mortality  Presenting problems: low  Diagnostic procedures: low  Management options: low    Patient Progress  Patient progress: stable                       Patient Care Considerations:    CT ABDOMEN AND PELVIS: I considered ordering a CT scan of the abdomen and pelvis however patient has no abdominal pain, abdomen is soft and flat, she has not had nausea or vomiting      Consultants/Shared Management Plan:    SHARED VISIT: I have discussed the case with my supervising physician, Dr. Card who states agrees with plan. The substantive portion of the medical decision was made by the attesting physician who made or approve the management plan and will take responsibility for the patient.  Clinical findings were discussed and ultimate disposition was made in consult with supervising physician.    Social Determinants of Health:    Patient has presented with family members who are responsible, reliable and will ensure follow up care.      Disposition and Care Coordination:    Discharged: The patient is suitable and stable for discharge with  no need for consideration of admission.    I have explained the patient´s condition, diagnoses and treatment plan based on the information available to me at this time. I have answered questions and addressed any concerns. The patient has a good  understanding of the patient´s diagnosis, condition, and treatment plan as can be expected at this point. The vital signs have been stable. The patient´s condition is stable and appropriate for discharge from the emergency department.      The patient will pursue further outpatient evaluation with the primary care physician or other designated or consulting physician as outlined in the discharge instructions. They are agreeable to this plan of care and follow-up instructions have been explained in detail. The patient has received these instructions in written format and has expressed an understanding of the discharge instructions. The patient is aware that any significant change in condition or worsening of symptoms should prompt an immediate return to this or the closest emergency department or call to 911.  I have explained discharge medications and the need for follow up with the patient/caretakers. This was also printed in the discharge instructions. Patient was discharged with the following medications and follow up:      Medication List      No changes were made to your prescriptions during this visit.      No follow-up provider specified.     Final diagnoses:   None        ED Disposition       None            This medical record created using voice recognition software.             Sue Levi PA-C  06/20/25 2738

## 2025-06-21 NOTE — ED PROVIDER NOTES
"SHARED VISIT ATTESTATION:    This visit was performed by myself and an APC.  I personally approved the management plan/medical decision making and take responsibility for the patient management.      SHARED VISIT NOTE:    Patient is 80 y.o. year old female that presents to the ED for evaluation of vaginal pain.     Physical Exam    ED Course:    /70   Pulse 66   Temp 98.8 °F (37.1 °C) (Oral)   Resp 14   Ht 162.6 cm (64\")   Wt 42.8 kg (94 lb 5.7 oz)   SpO2 100%   BMI 16.20 kg/m²       The following orders were placed and all results were independently analyzed by me:  Orders Placed This Encounter   Procedures    MVP Vaginosis Panel - Swab, Vagina    Chlamydia trachomatis, Neisseria gonorrhoeae, PCR - Swab, Cervix    Urinalysis With Microscopic If Indicated (No Culture) - Urine, Clean Catch    Urinalysis, Microscopic Only - Urine, Clean Catch       Medications Given in the Emergency Department:  Medications - No data to display     ED Course:    ED Course as of 06/20/25 2159 Fri Jun 20, 2025 2144 WBC, UA(!): 21-50 [AS]      ED Course User Index  [AS] Sue Levi, SAURABH       Labs:    Lab Results (last 24 hours)       Procedure Component Value Units Date/Time    MVP Vaginosis Panel - Swab, Vagina [[id number]] Collected: 06/20/25 2110    Specimen: Swab from Vagina Updated: 06/20/25 2117    Chlamydia trachomatis, Neisseria gonorrhoeae, PCR - Swab, Cervix [[id number]] Collected: 06/20/25 2110    Specimen: Swab from Cervix Updated: 06/20/25 2117    Urinalysis With Microscopic If Indicated (No Culture) - Straight Cath [[id number]]  (Abnormal) Collected: 06/20/25 2111    Specimen: Urine from Straight Cath Updated: 06/20/25 2131     Color, UA Yellow     Appearance, UA Clear     pH, UA 6.0     Specific Gravity, UA 1.029     Glucose, UA Negative     Ketones, UA Trace     Bilirubin, UA Negative     Blood, UA Negative     Protein, UA Negative     Leuk Esterase, UA Moderate (2+)     Nitrite, UA Negative     " Urobilinogen, UA 1.0 E.U./dL    Urinalysis, Microscopic Only - Straight Cath [[id number]]  (Abnormal) Collected: 06/20/25 2111    Specimen: Urine from Straight Cath Updated: 06/20/25 2131     RBC, UA 0-2 /HPF      WBC, UA 21-50 /HPF      Bacteria, UA None Seen /HPF      Squamous Epithelial Cells, UA 0-2 /HPF      Hyaline Casts, UA 0-2 /LPF      Methodology Automated Microscopy             Imaging:    No Radiology Exams Resulted Within Past 24 Hours    MDM:    Procedures    Labs were collected in the emergency department and all labs were reviewed and interpreted by me.                     Piyush Card MD  21:59 EDT  06/20/25         Piyush Card MD  06/20/25 3060

## 2025-06-21 NOTE — DISCHARGE INSTRUCTIONS
You were evaluated in the ER today.  You do have evidence of atrophic vaginitis on your examination which you are currently being treated for with the vaginal cream.  Make sure you drink plenty of fluids.  I have placed a referral to OB/GYN, they should contact you in several days to get scheduled to be seen in clinic.  Make sure you use nonscented soaps, do not use any lotions or creams that have since, avoid douching.

## 2025-06-30 ENCOUNTER — OFFICE VISIT (OUTPATIENT)
Dept: OBSTETRICS AND GYNECOLOGY | Age: 80
End: 2025-06-30
Payer: MEDICARE

## 2025-06-30 VITALS — SYSTOLIC BLOOD PRESSURE: 109 MMHG | HEART RATE: 74 BPM | DIASTOLIC BLOOD PRESSURE: 66 MMHG

## 2025-06-30 DIAGNOSIS — N95.2 VAGINAL ATROPHY: Primary | ICD-10-CM

## 2025-06-30 NOTE — ASSESSMENT & PLAN NOTE
Discussed no rash seen on exam today.  Recommend she continue to use vaginal estrogen twice weekly.  Also recommended she try Vmagic vulvar balm.  Patient and caregiver verbalize understanding.

## 2025-06-30 NOTE — PROGRESS NOTES
GYN Visit    CC:   Chief Complaint   Patient presents with    Follow-up     [facility] ER FU: ACUTE VAGINITIS       HPI:   80 y.o. Contraception or HRT: Post menopausal    Patient is here with complaints of her vulva hurting.  Her vulva is sore, it will burn at times.   She does wear a Depends all the time.    Has recently restarted using her vaginal estrogen in addition to Vaseline.       History: PMHx, Meds, Allergies, PSHx, Social Hx, and POBHx all reviewed and updated.    Review of Systems   Constitutional: Negative.    Genitourinary:         Vulvar pain       PHYSICAL EXAM:  /66   Pulse 74      Physical Exam  Vitals and nursing note reviewed. Exam conducted with a chaperone present.   Constitutional:       Appearance: Normal appearance. She is well-developed and well-groomed.   HENT:      Head: Normocephalic.   Eyes:      Pupils: Pupils are equal, round, and reactive to light.   Genitourinary:     General: Normal vulva.      Exam position: Lithotomy position.      Labia:         Right: No rash.         Left: No rash.    Skin:     General: Skin is warm and dry.   Neurological:      General: No focal deficit present.      Mental Status: She is alert.         ASSESSMENT AND PLAN:  Diagnoses and all orders for this visit:    1. Vaginal atrophy (Primary)  Assessment & Plan:  Discussed no rash seen on exam today.  Recommend she continue to use vaginal estrogen twice weekly.  Also recommended she try Vmagic vulvar balm.  Patient and caregiver verbalize understanding.           Counseling:  Call with concerns    Follow Up:  Return for as needed.          Sanjiv Kelly, APRN  2025    [facility] [address] [facility]  [address] [place] [address] [address] [place] [id number]  Dept: [phone]  Dept Fax: [phone]  Loc: [phone]

## (undated) DEVICE — MEDI-VAC NON-CONDUCTIVE SUCTION TUBING: Brand: CARDINAL HEALTH

## (undated) DEVICE — PAD SANI MAXI W/ADHS SNG WRP 11IN

## (undated) DEVICE — DRAPE,UNDERBUTTOCKS,PCH,STERILE: Brand: MEDLINE

## (undated) DEVICE — PROCTO SWABS: Brand: DEROYAL

## (undated) DEVICE — CATH URETH INTRMIT ALLPURP LTX 16F RED

## (undated) DEVICE — SYR LL TP 10ML STRL

## (undated) DEVICE — NDL HYPO ECLPS SFTY 22G 1 1/2IN

## (undated) DEVICE — SLV SCD LEG COMFORT KENDALLSCD MD REPROC

## (undated) DEVICE — GLV SURG BIOGEL LTX PF 7

## (undated) DEVICE — LEGGINGS, PAIR, CLEAR, STERILE: Brand: MEDLINE

## (undated) DEVICE — ELECTRD BLD STD SS 3/32X6.5IN

## (undated) DEVICE — MINOR-LF: Brand: MEDLINE INDUSTRIES, INC.

## (undated) DEVICE — ELECTRD LP COVIDIEN LLETZ TUNG 12X15MM